# Patient Record
Sex: MALE | Race: WHITE | NOT HISPANIC OR LATINO | Employment: OTHER | URBAN - METROPOLITAN AREA
[De-identification: names, ages, dates, MRNs, and addresses within clinical notes are randomized per-mention and may not be internally consistent; named-entity substitution may affect disease eponyms.]

---

## 2018-01-25 ENCOUNTER — OFFICE VISIT CONVERTED (OUTPATIENT)
Dept: FAMILY MEDICINE CLINIC | Facility: CLINIC | Age: 73
End: 2018-01-25
Attending: NURSE PRACTITIONER

## 2018-02-23 ENCOUNTER — CONVERSION ENCOUNTER (OUTPATIENT)
Dept: FAMILY MEDICINE CLINIC | Facility: CLINIC | Age: 73
End: 2018-02-23

## 2018-02-23 ENCOUNTER — OFFICE VISIT CONVERTED (OUTPATIENT)
Dept: FAMILY MEDICINE CLINIC | Facility: CLINIC | Age: 73
End: 2018-02-23
Attending: FAMILY MEDICINE

## 2018-07-23 ENCOUNTER — OFFICE VISIT CONVERTED (OUTPATIENT)
Dept: CARDIOLOGY | Facility: CLINIC | Age: 73
End: 2018-07-23
Attending: INTERNAL MEDICINE

## 2018-07-23 ENCOUNTER — CONVERSION ENCOUNTER (OUTPATIENT)
Dept: CARDIOLOGY | Facility: CLINIC | Age: 73
End: 2018-07-23

## 2018-08-20 ENCOUNTER — OFFICE VISIT CONVERTED (OUTPATIENT)
Dept: FAMILY MEDICINE CLINIC | Facility: CLINIC | Age: 73
End: 2018-08-20
Attending: FAMILY MEDICINE

## 2018-08-29 ENCOUNTER — OFFICE VISIT CONVERTED (OUTPATIENT)
Dept: FAMILY MEDICINE CLINIC | Facility: CLINIC | Age: 73
End: 2018-08-29
Attending: NURSE PRACTITIONER

## 2018-11-02 ENCOUNTER — OFFICE VISIT CONVERTED (OUTPATIENT)
Dept: FAMILY MEDICINE CLINIC | Facility: CLINIC | Age: 73
End: 2018-11-02
Attending: PHYSICIAN ASSISTANT

## 2018-11-02 ENCOUNTER — CONVERSION ENCOUNTER (OUTPATIENT)
Dept: FAMILY MEDICINE CLINIC | Facility: CLINIC | Age: 73
End: 2018-11-02

## 2019-01-14 ENCOUNTER — HOSPITAL ENCOUNTER (OUTPATIENT)
Dept: OTHER | Facility: HOSPITAL | Age: 74
Discharge: HOME | End: 2019-01-14
Attending: INTERNAL MEDICINE

## 2019-01-14 LAB
ALBUMIN SERPL-MCNC: 4.2 G/DL (ref 3.5–5)
ALBUMIN/GLOB SERPL: 1.4 {RATIO} (ref 1.4–2.6)
ALP SERPL-CCNC: 74 U/L (ref 56–155)
ALT SERPL-CCNC: 14 U/L (ref 10–40)
ANION GAP SERPL CALC-SCNC: 20 MMOL/L (ref 8–19)
AST SERPL-CCNC: 14 U/L (ref 15–50)
BILIRUB SERPL-MCNC: 0.38 MG/DL (ref 0.2–1.3)
BUN SERPL-MCNC: 22 MG/DL (ref 5–25)
BUN/CREAT SERPL: 16 {RATIO} (ref 6–20)
CALCIUM SERPL-MCNC: 9.6 MG/DL (ref 8.7–10.4)
CHLORIDE SERPL-SCNC: 102 MMOL/L (ref 99–111)
CHOLEST SERPL-MCNC: 147 MG/DL (ref 107–200)
CHOLEST/HDLC SERPL: 4 {RATIO} (ref 3–6)
CONV CO2: 22 MMOL/L (ref 22–32)
CONV TOTAL PROTEIN: 7.1 G/DL (ref 6.3–8.2)
CREAT UR-MCNC: 1.34 MG/DL (ref 0.7–1.2)
GFR SERPLBLD BASED ON 1.73 SQ M-ARVRAT: 52 ML/MIN/{1.73_M2}
GLOBULIN UR ELPH-MCNC: 2.9 G/DL (ref 2–3.5)
GLUCOSE SERPL-MCNC: 97 MG/DL (ref 70–99)
HDLC SERPL-MCNC: 37 MG/DL (ref 40–60)
LDLC SERPL CALC-MCNC: 47 MG/DL (ref 70–100)
OSMOLALITY SERPL CALC.SUM OF ELEC: 291 MOSM/KG (ref 273–304)
POTASSIUM SERPL-SCNC: 4.6 MMOL/L (ref 3.5–5.3)
SODIUM SERPL-SCNC: 139 MMOL/L (ref 135–147)
TRIGL SERPL-MCNC: 315 MG/DL (ref 40–150)
VLDLC SERPL-MCNC: 63 MG/DL (ref 5–37)

## 2019-01-28 ENCOUNTER — OFFICE VISIT CONVERTED (OUTPATIENT)
Dept: CARDIOLOGY | Facility: CLINIC | Age: 74
End: 2019-01-28
Attending: NURSE PRACTITIONER

## 2019-01-28 ENCOUNTER — CONVERSION ENCOUNTER (OUTPATIENT)
Dept: CARDIOLOGY | Facility: CLINIC | Age: 74
End: 2019-01-28

## 2019-02-20 ENCOUNTER — HOSPITAL ENCOUNTER (OUTPATIENT)
Dept: OTHER | Facility: HOSPITAL | Age: 74
Discharge: HOME | End: 2019-02-20
Attending: NURSE PRACTITIONER

## 2019-02-20 LAB
ANION GAP SERPL CALC-SCNC: 17 MMOL/L (ref 8–19)
BUN SERPL-MCNC: 18 MG/DL (ref 5–25)
BUN/CREAT SERPL: 15 {RATIO} (ref 6–20)
CALCIUM SERPL-MCNC: 9.4 MG/DL (ref 8.7–10.4)
CHLORIDE SERPL-SCNC: 102 MMOL/L (ref 99–111)
CONV CO2: 26 MMOL/L (ref 22–32)
CREAT UR-MCNC: 1.19 MG/DL (ref 0.7–1.2)
GFR SERPLBLD BASED ON 1.73 SQ M-ARVRAT: >60 ML/MIN/{1.73_M2}
GLUCOSE SERPL-MCNC: 120 MG/DL (ref 70–99)
OSMOLALITY SERPL CALC.SUM OF ELEC: 293 MOSM/KG (ref 273–304)
POTASSIUM SERPL-SCNC: 4.5 MMOL/L (ref 3.5–5.3)
SODIUM SERPL-SCNC: 140 MMOL/L (ref 135–147)

## 2019-04-01 ENCOUNTER — OFFICE VISIT CONVERTED (OUTPATIENT)
Dept: FAMILY MEDICINE CLINIC | Facility: CLINIC | Age: 74
End: 2019-04-01
Attending: NURSE PRACTITIONER

## 2019-07-17 ENCOUNTER — HOSPITAL ENCOUNTER (OUTPATIENT)
Dept: LAB | Facility: HOSPITAL | Age: 74
Discharge: HOME | End: 2019-07-17
Attending: FAMILY MEDICINE

## 2019-07-17 LAB
ANION GAP SERPL CALC-SCNC: 23 MMOL/L (ref 8–19)
BASOPHILS # BLD AUTO: 0.03 10*3/UL (ref 0–0.2)
BASOPHILS NFR BLD AUTO: 0.4 % (ref 0–3)
BUN SERPL-MCNC: 22 MG/DL (ref 5–25)
BUN/CREAT SERPL: 16 {RATIO} (ref 6–20)
CALCIUM SERPL-MCNC: 9.6 MG/DL (ref 8.7–10.4)
CHLORIDE SERPL-SCNC: 100 MMOL/L (ref 99–111)
CONV ABS IMM GRAN: 0.05 10*3/UL (ref 0–0.2)
CONV CO2: 22 MMOL/L (ref 22–32)
CONV IMMATURE GRAN: 0.6 % (ref 0–1.8)
CREAT UR-MCNC: 1.36 MG/DL (ref 0.7–1.2)
DEPRECATED RDW RBC AUTO: 46.1 FL (ref 35.1–43.9)
EOSINOPHIL # BLD AUTO: 0.4 10*3/UL (ref 0–0.7)
EOSINOPHIL # BLD AUTO: 5.1 % (ref 0–7)
ERYTHROCYTE [DISTWIDTH] IN BLOOD BY AUTOMATED COUNT: 13.4 % (ref 11.6–14.4)
FOLATE SERPL-MCNC: >20 NG/ML (ref 4.8–20)
GFR SERPLBLD BASED ON 1.73 SQ M-ARVRAT: 51 ML/MIN/{1.73_M2}
GLUCOSE SERPL-MCNC: 90 MG/DL (ref 70–99)
HBA1C MFR BLD: 13.3 G/DL (ref 14–18)
HCT VFR BLD AUTO: 41.1 % (ref 42–52)
IRON SATN MFR SERPL: 34 % (ref 20–55)
IRON SERPL-MCNC: 89 UG/DL (ref 70–180)
LYMPHOCYTES # BLD AUTO: 2.09 10*3/UL (ref 1–5)
MCH RBC QN AUTO: 30.4 PG (ref 27–31)
MCHC RBC AUTO-ENTMCNC: 32.4 G/DL (ref 33–37)
MCV RBC AUTO: 93.8 FL (ref 80–96)
MONOCYTES # BLD AUTO: 0.5 10*3/UL (ref 0.2–1.2)
MONOCYTES NFR BLD AUTO: 6.3 % (ref 3–10)
NEUTROPHILS # BLD AUTO: 4.84 10*3/UL (ref 2–8)
NEUTROPHILS NFR BLD AUTO: 61.2 % (ref 30–85)
NRBC CBCN: 0 % (ref 0–0.7)
OSMOLALITY SERPL CALC.SUM OF ELEC: 291 MOSM/KG (ref 273–304)
PLATELET # BLD AUTO: 182 10*3/UL (ref 130–400)
PMV BLD AUTO: 10.9 FL (ref 9.4–12.4)
POTASSIUM SERPL-SCNC: 5.6 MMOL/L (ref 3.5–5.3)
RBC # BLD AUTO: 4.38 10*6/UL (ref 4.7–6.1)
SODIUM SERPL-SCNC: 139 MMOL/L (ref 135–147)
TIBC SERPL-MCNC: 263 UG/DL (ref 245–450)
TRANSFERRIN SERPL-MCNC: 184 MG/DL (ref 215–365)
VARIANT LYMPHS NFR BLD MANUAL: 26.4 % (ref 20–45)
VIT B12 SERPL-MCNC: 1277 PG/ML (ref 211–911)
WBC # BLD AUTO: 7.91 10*3/UL (ref 4.8–10.8)

## 2019-07-22 ENCOUNTER — HOSPITAL ENCOUNTER (OUTPATIENT)
Dept: LAB | Facility: HOSPITAL | Age: 74
Discharge: HOME | End: 2019-07-22
Attending: FAMILY MEDICINE

## 2019-07-22 LAB
FERRITIN SERPL-MCNC: 558 NG/ML (ref 30–300)
POTASSIUM SERPL-SCNC: 4.5 MMOL/L (ref 3.5–5.3)

## 2019-10-25 ENCOUNTER — OFFICE VISIT CONVERTED (OUTPATIENT)
Dept: FAMILY MEDICINE CLINIC | Facility: CLINIC | Age: 74
End: 2019-10-25
Attending: NURSE PRACTITIONER

## 2019-10-28 ENCOUNTER — HOSPITAL ENCOUNTER (OUTPATIENT)
Dept: LAB | Facility: HOSPITAL | Age: 74
Discharge: HOME | End: 2019-10-28
Attending: NURSE PRACTITIONER

## 2019-10-28 LAB
CHOLEST SERPL-MCNC: 146 MG/DL (ref 107–200)
CHOLEST/HDLC SERPL: 3.7 {RATIO} (ref 3–6)
HDLC SERPL-MCNC: 40 MG/DL (ref 40–60)
LDLC SERPL CALC-MCNC: 58 MG/DL (ref 70–100)
PSA SERPL-MCNC: 0.8 NG/ML (ref 0–4)
TRIGL SERPL-MCNC: 242 MG/DL (ref 40–150)
VLDLC SERPL-MCNC: 48 MG/DL (ref 5–37)

## 2019-11-01 ENCOUNTER — HOSPITAL ENCOUNTER (OUTPATIENT)
Dept: LAB | Facility: HOSPITAL | Age: 74
Discharge: HOME | End: 2019-11-01
Attending: INTERNAL MEDICINE

## 2019-11-01 LAB
ALBUMIN SERPL-MCNC: 4.5 G/DL (ref 3.5–5)
ALBUMIN/GLOB SERPL: 1.7 {RATIO} (ref 1.4–2.6)
ALP SERPL-CCNC: 74 U/L (ref 56–155)
ALT SERPL-CCNC: 20 U/L (ref 10–40)
ANION GAP SERPL CALC-SCNC: 19 MMOL/L (ref 8–19)
APPEARANCE UR: CLEAR
AST SERPL-CCNC: 20 U/L (ref 15–50)
BILIRUB SERPL-MCNC: 0.35 MG/DL (ref 0.2–1.3)
BILIRUB UR QL: NEGATIVE
BUN SERPL-MCNC: 14 MG/DL (ref 5–25)
BUN/CREAT SERPL: 14 {RATIO} (ref 6–20)
CALCIUM SERPL-MCNC: 9.8 MG/DL (ref 8.7–10.4)
CHLORIDE SERPL-SCNC: 103 MMOL/L (ref 99–111)
CHOLEST SERPL-MCNC: 152 MG/DL (ref 107–200)
CHOLEST/HDLC SERPL: 3.9 {RATIO} (ref 3–6)
COLOR UR: YELLOW
CONV CO2: 23 MMOL/L (ref 22–32)
CONV COLLECTION SOURCE (UA): NORMAL
CONV TOTAL PROTEIN: 7.2 G/DL (ref 6.3–8.2)
CONV UROBILINOGEN IN URINE BY AUTOMATED TEST STRIP: 0.2 {EHRLICHU}/DL (ref 0.1–1)
CREAT UR-MCNC: 1.03 MG/DL (ref 0.7–1.2)
GFR SERPLBLD BASED ON 1.73 SQ M-ARVRAT: >60 ML/MIN/{1.73_M2}
GLOBULIN UR ELPH-MCNC: 2.7 G/DL (ref 2–3.5)
GLUCOSE SERPL-MCNC: 101 MG/DL (ref 70–99)
GLUCOSE UR QL: NEGATIVE MG/DL
HDLC SERPL-MCNC: 39 MG/DL (ref 40–60)
HGB UR QL STRIP: NEGATIVE
KETONES UR QL STRIP: NEGATIVE MG/DL
LDLC SERPL CALC-MCNC: 51 MG/DL (ref 70–100)
LEUKOCYTE ESTERASE UR QL STRIP: NEGATIVE
NITRITE UR QL STRIP: NEGATIVE
OSMOLALITY SERPL CALC.SUM OF ELEC: 291 MOSM/KG (ref 273–304)
PH UR STRIP.AUTO: 6 [PH] (ref 5–8)
POTASSIUM SERPL-SCNC: 4.5 MMOL/L (ref 3.5–5.3)
PROT UR QL: NEGATIVE MG/DL
SODIUM SERPL-SCNC: 140 MMOL/L (ref 135–147)
SP GR UR: 1.01 (ref 1–1.03)
TRIGL SERPL-MCNC: 308 MG/DL (ref 40–150)
VLDLC SERPL-MCNC: 62 MG/DL (ref 5–37)

## 2019-11-04 ENCOUNTER — CONVERSION ENCOUNTER (OUTPATIENT)
Dept: CARDIOLOGY | Facility: CLINIC | Age: 74
End: 2019-11-04

## 2019-11-04 ENCOUNTER — HOSPITAL ENCOUNTER (OUTPATIENT)
Dept: LAB | Facility: HOSPITAL | Age: 74
Discharge: HOME | End: 2019-11-04
Attending: INTERNAL MEDICINE

## 2019-11-04 ENCOUNTER — OFFICE VISIT CONVERTED (OUTPATIENT)
Dept: CARDIOLOGY | Facility: CLINIC | Age: 74
End: 2019-11-04
Attending: NURSE PRACTITIONER

## 2019-11-04 LAB
PROT 24H UR-MRATE: 104.6 MG/(24.H) (ref 42–225)
PROT 24H UR-MRATE: 12.3 MG/DL (ref 0–12)
SPECIMEN VOL 24H UR: 850 ML

## 2020-01-25 ENCOUNTER — HOSPITAL ENCOUNTER (OUTPATIENT)
Dept: URGENT CARE | Facility: CLINIC | Age: 75
Discharge: HOME | End: 2020-01-25

## 2020-01-27 LAB — BACTERIA SPEC AEROBE CULT: NORMAL

## 2020-01-31 ENCOUNTER — OFFICE VISIT CONVERTED (OUTPATIENT)
Dept: FAMILY MEDICINE CLINIC | Facility: CLINIC | Age: 75
End: 2020-01-31
Attending: NURSE PRACTITIONER

## 2020-01-31 ENCOUNTER — HOSPITAL ENCOUNTER (OUTPATIENT)
Dept: GENERAL RADIOLOGY | Facility: HOSPITAL | Age: 75
Discharge: HOME | End: 2020-01-31
Attending: NURSE PRACTITIONER

## 2020-01-31 ENCOUNTER — CONVERSION ENCOUNTER (OUTPATIENT)
Dept: FAMILY MEDICINE CLINIC | Facility: CLINIC | Age: 75
End: 2020-01-31

## 2020-04-29 ENCOUNTER — HOSPITAL ENCOUNTER (OUTPATIENT)
Dept: GENERAL RADIOLOGY | Facility: HOSPITAL | Age: 75
Discharge: HOME | End: 2020-04-29
Attending: NURSE PRACTITIONER

## 2020-04-29 ENCOUNTER — TELEMEDICINE CONVERTED (OUTPATIENT)
Dept: FAMILY MEDICINE CLINIC | Facility: CLINIC | Age: 75
End: 2020-04-29
Attending: NURSE PRACTITIONER

## 2020-05-20 ENCOUNTER — TELEMEDICINE CONVERTED (OUTPATIENT)
Dept: FAMILY MEDICINE CLINIC | Facility: CLINIC | Age: 75
End: 2020-05-20
Attending: NURSE PRACTITIONER

## 2020-05-20 ENCOUNTER — CONVERSION ENCOUNTER (OUTPATIENT)
Dept: OTHER | Facility: HOSPITAL | Age: 75
End: 2020-05-20

## 2020-05-20 ENCOUNTER — HOSPITAL ENCOUNTER (OUTPATIENT)
Dept: OTHER | Facility: HOSPITAL | Age: 75
Discharge: HOME | End: 2020-05-20
Attending: NURSE PRACTITIONER

## 2020-05-21 ENCOUNTER — HOSPITAL ENCOUNTER (OUTPATIENT)
Dept: CT IMAGING | Facility: HOSPITAL | Age: 75
Discharge: HOME | End: 2020-05-21
Attending: NURSE PRACTITIONER

## 2020-05-21 LAB
CREAT BLD-MCNC: 1.2 MG/DL (ref 0.6–1.4)
GFR SERPLBLD BASED ON 1.73 SQ M-ARVRAT: 59 ML/MIN/{1.73_M2}

## 2020-05-22 LAB — SARS-COV-2 RNA SPEC QL NAA+PROBE: NOT DETECTED

## 2020-05-28 ENCOUNTER — HOSPITAL ENCOUNTER (OUTPATIENT)
Dept: CARDIOLOGY | Facility: HOSPITAL | Age: 75
Discharge: HOME | End: 2020-05-28
Attending: NURSE PRACTITIONER

## 2020-06-24 ENCOUNTER — OFFICE VISIT CONVERTED (OUTPATIENT)
Dept: FAMILY MEDICINE CLINIC | Facility: CLINIC | Age: 75
End: 2020-06-24
Attending: NURSE PRACTITIONER

## 2020-06-26 ENCOUNTER — HOSPITAL ENCOUNTER (OUTPATIENT)
Dept: PERIOP | Facility: HOSPITAL | Age: 75
Discharge: HOME | End: 2020-06-26
Attending: OPHTHALMOLOGY

## 2020-06-29 LAB — SARS-COV-2 RNA SPEC QL NAA+PROBE: NOT DETECTED

## 2020-06-30 ENCOUNTER — HOSPITAL ENCOUNTER (OUTPATIENT)
Dept: PERIOP | Facility: HOSPITAL | Age: 75
Setting detail: HOSPITAL OUTPATIENT SURGERY
Discharge: HOME | End: 2020-06-30
Attending: OPHTHALMOLOGY

## 2020-07-20 ENCOUNTER — HOSPITAL ENCOUNTER (OUTPATIENT)
Dept: CARDIOLOGY | Facility: HOSPITAL | Age: 75
Discharge: HOME | End: 2020-07-20
Attending: SURGERY

## 2020-08-03 ENCOUNTER — HOSPITAL ENCOUNTER (OUTPATIENT)
Dept: OTHER | Facility: HOSPITAL | Age: 75
Discharge: HOME | End: 2020-08-03
Attending: NURSE PRACTITIONER

## 2020-08-03 LAB
ALBUMIN SERPL-MCNC: 4.1 G/DL (ref 3.5–5)
ALBUMIN/GLOB SERPL: 1.5 {RATIO} (ref 1.4–2.6)
ALP SERPL-CCNC: 69 U/L (ref 56–155)
ALT SERPL-CCNC: 19 U/L (ref 10–40)
ANION GAP SERPL CALC-SCNC: 21 MMOL/L (ref 8–19)
AST SERPL-CCNC: 19 U/L (ref 15–50)
BILIRUB SERPL-MCNC: 0.6 MG/DL (ref 0.2–1.3)
BUN SERPL-MCNC: 16 MG/DL (ref 5–25)
BUN/CREAT SERPL: 14 {RATIO} (ref 6–20)
CALCIUM SERPL-MCNC: 10.2 MG/DL (ref 8.7–10.4)
CHLORIDE SERPL-SCNC: 102 MMOL/L (ref 99–111)
CHOLEST SERPL-MCNC: 160 MG/DL (ref 107–200)
CHOLEST/HDLC SERPL: 4.3 {RATIO} (ref 3–6)
CONV CO2: 21 MMOL/L (ref 22–32)
CONV TOTAL PROTEIN: 6.9 G/DL (ref 6.3–8.2)
CREAT UR-MCNC: 1.14 MG/DL (ref 0.7–1.2)
GFR SERPLBLD BASED ON 1.73 SQ M-ARVRAT: >60 ML/MIN/{1.73_M2}
GLOBULIN UR ELPH-MCNC: 2.8 G/DL (ref 2–3.5)
GLUCOSE SERPL-MCNC: 92 MG/DL (ref 70–99)
HDLC SERPL-MCNC: 37 MG/DL (ref 40–60)
LDLC SERPL CALC-MCNC: 64 MG/DL (ref 70–100)
OSMOLALITY SERPL CALC.SUM OF ELEC: 289 MOSM/KG (ref 273–304)
POTASSIUM SERPL-SCNC: 4.5 MMOL/L (ref 3.5–5.3)
SODIUM SERPL-SCNC: 139 MMOL/L (ref 135–147)
TRIGL SERPL-MCNC: 295 MG/DL (ref 40–150)
VLDLC SERPL-MCNC: 59 MG/DL (ref 5–37)

## 2020-08-10 ENCOUNTER — OFFICE VISIT CONVERTED (OUTPATIENT)
Dept: CARDIOLOGY | Facility: CLINIC | Age: 75
End: 2020-08-10
Attending: INTERNAL MEDICINE

## 2020-08-14 ENCOUNTER — HOSPITAL ENCOUNTER (OUTPATIENT)
Dept: OTHER | Facility: HOSPITAL | Age: 75
Discharge: HOME | End: 2020-08-14
Attending: INTERNAL MEDICINE

## 2020-08-14 LAB
EST. AVERAGE GLUCOSE BLD GHB EST-MCNC: 123 MG/DL
GLUCOSE SERPL-MCNC: 96 MG/DL (ref 70–110)
HBA1C MFR BLD: 5.9 % (ref 3.5–5.7)
TSH SERPL-ACNC: 0.67 M[IU]/L (ref 0.27–4.2)

## 2020-08-21 ENCOUNTER — HOSPITAL ENCOUNTER (OUTPATIENT)
Dept: PREADMISSION TESTING | Facility: HOSPITAL | Age: 75
Discharge: HOME | End: 2020-08-21
Attending: NURSE PRACTITIONER

## 2020-08-22 LAB — SARS-COV-2 RNA SPEC QL NAA+PROBE: NOT DETECTED

## 2020-08-26 ENCOUNTER — HOSPITAL ENCOUNTER (OUTPATIENT)
Dept: CARDIOLOGY | Facility: HOSPITAL | Age: 75
Discharge: HOME | End: 2020-08-26
Attending: NURSE PRACTITIONER

## 2020-10-01 ENCOUNTER — OFFICE VISIT CONVERTED (OUTPATIENT)
Dept: ONCOLOGY | Facility: HOSPITAL | Age: 75
End: 2020-10-01
Attending: INTERNAL MEDICINE

## 2020-10-08 ENCOUNTER — HOSPITAL ENCOUNTER (OUTPATIENT)
Dept: URGENT CARE | Facility: CLINIC | Age: 75
Discharge: HOME | End: 2020-10-08
Attending: FAMILY MEDICINE

## 2020-10-10 LAB
BACTERIA SPEC AEROBE CULT: NORMAL
SARS-COV-2 RNA SPEC QL NAA+PROBE: DETECTED

## 2020-10-19 ENCOUNTER — HOSPITAL ENCOUNTER (OUTPATIENT)
Dept: LAB | Facility: HOSPITAL | Age: 75
Discharge: HOME | End: 2020-10-19
Attending: INTERNAL MEDICINE

## 2020-10-19 LAB
ANION GAP SERPL CALC-SCNC: 16 MMOL/L (ref 8–19)
BUN SERPL-MCNC: 15 MG/DL (ref 5–25)
BUN/CREAT SERPL: 13 {RATIO} (ref 6–20)
CALCIUM SERPL-MCNC: 9.9 MG/DL (ref 8.7–10.4)
CHLORIDE SERPL-SCNC: 103 MMOL/L (ref 99–111)
CONV CO2: 25 MMOL/L (ref 22–32)
CREAT UR-MCNC: 1.17 MG/DL (ref 0.7–1.2)
GFR SERPLBLD BASED ON 1.73 SQ M-ARVRAT: >60 ML/MIN/{1.73_M2}
GLUCOSE SERPL-MCNC: 87 MG/DL (ref 70–99)
OSMOLALITY SERPL CALC.SUM OF ELEC: 290 MOSM/KG (ref 273–304)
POTASSIUM SERPL-SCNC: 4.2 MMOL/L (ref 3.5–5.3)
SODIUM SERPL-SCNC: 140 MMOL/L (ref 135–147)

## 2020-10-24 ENCOUNTER — HOSPITAL ENCOUNTER (OUTPATIENT)
Dept: OTHER | Facility: HOSPITAL | Age: 75
Discharge: HOME | End: 2020-10-24
Attending: INTERNAL MEDICINE

## 2020-10-24 LAB
PROT 24H UR-MRATE: 12.8 MG/DL (ref 0–12)
PROT 24H UR-MRATE: 128 MG/(24.H) (ref 42–225)
SPECIMEN VOL 24H UR: 1000 ML

## 2020-10-27 LAB
APPEARANCE UR: CLEAR
BILIRUB UR QL: NEGATIVE
CASTS URNS QL MICRO: ABNORMAL /[LPF]
COLOR UR: YELLOW
CONV BACTERIA: NEGATIVE
CONV COLLECTION SOURCE (UA): ABNORMAL
CONV HYALINE CASTS IN URINE MICRO: ABNORMAL /[LPF]
CONV UROBILINOGEN IN URINE BY AUTOMATED TEST STRIP: 0.2 {EHRLICHU}/DL (ref 0.1–1)
GLUCOSE UR QL: NEGATIVE MG/DL
HGB UR QL STRIP: NEGATIVE
KETONES UR QL STRIP: NEGATIVE MG/DL
LEUKOCYTE ESTERASE UR QL STRIP: ABNORMAL
NITRITE UR QL STRIP: NEGATIVE
PH UR STRIP.AUTO: 5.5 [PH] (ref 5–8)
PROT UR QL: NEGATIVE MG/DL
RBC #/AREA URNS HPF: ABNORMAL /[HPF]
SP GR UR: 1.02 (ref 1–1.03)
WBC #/AREA URNS HPF: ABNORMAL /[HPF]

## 2020-11-09 ENCOUNTER — OFFICE VISIT CONVERTED (OUTPATIENT)
Dept: FAMILY MEDICINE CLINIC | Facility: CLINIC | Age: 75
End: 2020-11-09
Attending: NURSE PRACTITIONER

## 2020-11-09 ENCOUNTER — HOSPITAL ENCOUNTER (OUTPATIENT)
Dept: LAB | Facility: HOSPITAL | Age: 75
Discharge: HOME | End: 2020-11-09
Attending: NURSE PRACTITIONER

## 2020-11-09 LAB
25(OH)D3 SERPL-MCNC: 45.9 NG/ML (ref 30–100)
PSA SERPL-MCNC: 0.81 NG/ML (ref 0–4)
TESTOST SERPL-MCNC: 252 NG/DL (ref 193–740)

## 2020-12-01 ENCOUNTER — HOSPITAL ENCOUNTER (OUTPATIENT)
Dept: OTHER | Facility: HOSPITAL | Age: 75
Discharge: HOME | End: 2020-12-01
Attending: INTERNAL MEDICINE

## 2020-12-01 LAB
ALBUMIN SERPL-MCNC: 4.4 G/DL (ref 3.5–5)
ALBUMIN/GLOB SERPL: 1.6 {RATIO} (ref 1.4–2.6)
ALP SERPL-CCNC: 72 U/L (ref 56–155)
ALT SERPL-CCNC: 12 U/L (ref 10–40)
ANION GAP SERPL CALC-SCNC: 16 MMOL/L (ref 8–19)
AST SERPL-CCNC: 15 U/L (ref 15–50)
BILIRUB SERPL-MCNC: 0.57 MG/DL (ref 0.2–1.3)
BNP SERPL-MCNC: 43 PG/ML (ref 0–900)
BUN SERPL-MCNC: 20 MG/DL (ref 5–25)
BUN/CREAT SERPL: 14 {RATIO} (ref 6–20)
CALCIUM SERPL-MCNC: 10.3 MG/DL (ref 8.7–10.4)
CHLORIDE SERPL-SCNC: 103 MMOL/L (ref 99–111)
CHOLEST SERPL-MCNC: 149 MG/DL (ref 107–200)
CHOLEST/HDLC SERPL: 3.9 {RATIO} (ref 3–6)
CONV CO2: 26 MMOL/L (ref 22–32)
CONV TOTAL PROTEIN: 7.2 G/DL (ref 6.3–8.2)
CREAT UR-MCNC: 1.44 MG/DL (ref 0.7–1.2)
GFR SERPLBLD BASED ON 1.73 SQ M-ARVRAT: 47 ML/MIN/{1.73_M2}
GLOBULIN UR ELPH-MCNC: 2.8 G/DL (ref 2–3.5)
GLUCOSE SERPL-MCNC: 105 MG/DL (ref 70–99)
HDLC SERPL-MCNC: 38 MG/DL (ref 40–60)
LDLC SERPL CALC-MCNC: 66 MG/DL (ref 70–100)
OSMOLALITY SERPL CALC.SUM OF ELEC: 293 MOSM/KG (ref 273–304)
POTASSIUM SERPL-SCNC: 4.5 MMOL/L (ref 3.5–5.3)
SODIUM SERPL-SCNC: 140 MMOL/L (ref 135–147)
T4 FREE SERPL-MCNC: 1.3 NG/DL (ref 0.9–1.8)
TRIGL SERPL-MCNC: 227 MG/DL (ref 40–150)
TSH SERPL-ACNC: 2.47 M[IU]/L (ref 0.27–4.2)
VLDLC SERPL-MCNC: 45 MG/DL (ref 5–37)

## 2020-12-04 ENCOUNTER — TELEMEDICINE CONVERTED (OUTPATIENT)
Dept: FAMILY MEDICINE CLINIC | Facility: CLINIC | Age: 75
End: 2020-12-04
Attending: NURSE PRACTITIONER

## 2020-12-15 ENCOUNTER — OFFICE VISIT CONVERTED (OUTPATIENT)
Dept: UROLOGY | Facility: CLINIC | Age: 75
End: 2020-12-15
Attending: NURSE PRACTITIONER

## 2020-12-15 ENCOUNTER — CONVERSION ENCOUNTER (OUTPATIENT)
Dept: UROLOGY | Facility: CLINIC | Age: 75
End: 2020-12-15

## 2020-12-16 ENCOUNTER — OFFICE VISIT CONVERTED (OUTPATIENT)
Dept: OTOLARYNGOLOGY | Facility: CLINIC | Age: 75
End: 2020-12-16
Attending: NURSE PRACTITIONER

## 2021-01-04 ENCOUNTER — HOSPITAL ENCOUNTER (OUTPATIENT)
Dept: OTHER | Facility: HOSPITAL | Age: 76
Discharge: HOME | End: 2021-01-04
Attending: NURSE PRACTITIONER

## 2021-01-04 LAB
ANION GAP SERPL CALC-SCNC: 15 MMOL/L (ref 8–19)
BUN SERPL-MCNC: 16 MG/DL (ref 5–25)
BUN/CREAT SERPL: 13 {RATIO} (ref 6–20)
CALCIUM SERPL-MCNC: 9.4 MG/DL (ref 8.7–10.4)
CHLORIDE SERPL-SCNC: 103 MMOL/L (ref 99–111)
CONV CO2: 25 MMOL/L (ref 22–32)
CREAT UR-MCNC: 1.24 MG/DL (ref 0.7–1.2)
GFR SERPLBLD BASED ON 1.73 SQ M-ARVRAT: 57 ML/MIN/{1.73_M2}
GLUCOSE SERPL-MCNC: 97 MG/DL (ref 70–99)
OSMOLALITY SERPL CALC.SUM OF ELEC: 289 MOSM/KG (ref 273–304)
POTASSIUM SERPL-SCNC: 4.3 MMOL/L (ref 3.5–5.3)
SODIUM SERPL-SCNC: 139 MMOL/L (ref 135–147)

## 2021-01-14 ENCOUNTER — CONVERSION ENCOUNTER (OUTPATIENT)
Dept: CARDIOLOGY | Facility: CLINIC | Age: 76
End: 2021-01-14

## 2021-01-14 ENCOUNTER — HOSPITAL ENCOUNTER (OUTPATIENT)
Dept: GENERAL RADIOLOGY | Facility: HOSPITAL | Age: 76
Discharge: HOME | End: 2021-01-14
Attending: NURSE PRACTITIONER

## 2021-01-14 ENCOUNTER — OFFICE VISIT CONVERTED (OUTPATIENT)
Dept: CARDIOLOGY | Facility: CLINIC | Age: 76
End: 2021-01-14
Attending: INTERNAL MEDICINE

## 2021-01-14 ENCOUNTER — CONVERSION ENCOUNTER (OUTPATIENT)
Dept: FAMILY MEDICINE CLINIC | Facility: CLINIC | Age: 76
End: 2021-01-14

## 2021-01-14 ENCOUNTER — OFFICE VISIT CONVERTED (OUTPATIENT)
Dept: FAMILY MEDICINE CLINIC | Facility: CLINIC | Age: 76
End: 2021-01-14
Attending: NURSE PRACTITIONER

## 2021-02-24 ENCOUNTER — HOSPITAL ENCOUNTER (OUTPATIENT)
Dept: VACCINE CLINIC | Facility: HOSPITAL | Age: 76
Discharge: HOME | End: 2021-02-24
Attending: INTERNAL MEDICINE

## 2021-03-09 ENCOUNTER — HOSPITAL ENCOUNTER (OUTPATIENT)
Dept: LAB | Facility: HOSPITAL | Age: 76
Discharge: HOME | End: 2021-03-09
Attending: NURSE PRACTITIONER

## 2021-03-09 ENCOUNTER — OFFICE VISIT CONVERTED (OUTPATIENT)
Dept: FAMILY MEDICINE CLINIC | Facility: CLINIC | Age: 76
End: 2021-03-09
Attending: NURSE PRACTITIONER

## 2021-03-09 ENCOUNTER — CONVERSION ENCOUNTER (OUTPATIENT)
Dept: FAMILY MEDICINE CLINIC | Facility: CLINIC | Age: 76
End: 2021-03-09

## 2021-03-09 LAB
25(OH)D3 SERPL-MCNC: 39.7 NG/ML (ref 30–100)
ALBUMIN SERPL-MCNC: 4.2 G/DL (ref 3.5–5)
ALBUMIN/GLOB SERPL: 1.6 {RATIO} (ref 1.4–2.6)
ALP SERPL-CCNC: 68 U/L (ref 56–155)
ALT SERPL-CCNC: 19 U/L (ref 10–40)
ANION GAP SERPL CALC-SCNC: 15 MMOL/L (ref 8–19)
AST SERPL-CCNC: 21 U/L (ref 15–50)
BASOPHILS # BLD AUTO: 0.05 10*3/UL (ref 0–0.2)
BASOPHILS NFR BLD AUTO: 0.6 % (ref 0–3)
BILIRUB SERPL-MCNC: 0.36 MG/DL (ref 0.2–1.3)
BUN SERPL-MCNC: 16 MG/DL (ref 5–25)
BUN/CREAT SERPL: 14 {RATIO} (ref 6–20)
CALCIUM SERPL-MCNC: 9.2 MG/DL (ref 8.7–10.4)
CHLORIDE SERPL-SCNC: 105 MMOL/L (ref 99–111)
CHOLEST SERPL-MCNC: 142 MG/DL (ref 107–200)
CHOLEST/HDLC SERPL: 3.7 {RATIO} (ref 3–6)
CONV ABS IMM GRAN: 0.05 10*3/UL (ref 0–0.2)
CONV CO2: 25 MMOL/L (ref 22–32)
CONV IMMATURE GRAN: 0.6 % (ref 0–1.8)
CONV TOTAL PROTEIN: 6.9 G/DL (ref 6.3–8.2)
CREAT UR-MCNC: 1.15 MG/DL (ref 0.7–1.2)
DEPRECATED RDW RBC AUTO: 45.4 FL (ref 35.1–43.9)
EOSINOPHIL # BLD AUTO: 0.28 10*3/UL (ref 0–0.7)
EOSINOPHIL # BLD AUTO: 3.4 % (ref 0–7)
ERYTHROCYTE [DISTWIDTH] IN BLOOD BY AUTOMATED COUNT: 13.5 % (ref 11.6–14.4)
EST. AVERAGE GLUCOSE BLD GHB EST-MCNC: 117 MG/DL
FERRITIN SERPL-MCNC: 489 NG/ML (ref 30–300)
GFR SERPLBLD BASED ON 1.73 SQ M-ARVRAT: >60 ML/MIN/{1.73_M2}
GLOBULIN UR ELPH-MCNC: 2.7 G/DL (ref 2–3.5)
GLUCOSE SERPL-MCNC: 98 MG/DL (ref 70–99)
HBA1C MFR BLD: 5.7 % (ref 3.5–5.7)
HCT VFR BLD AUTO: 44.9 % (ref 42–52)
HDLC SERPL-MCNC: 38 MG/DL (ref 40–60)
HGB BLD-MCNC: 14.1 G/DL (ref 14–18)
IRON SATN MFR SERPL: 23 % (ref 20–55)
IRON SERPL-MCNC: 59 UG/DL (ref 70–180)
LDLC SERPL CALC-MCNC: 57 MG/DL (ref 70–100)
LYMPHOCYTES # BLD AUTO: 2.22 10*3/UL (ref 1–5)
LYMPHOCYTES NFR BLD AUTO: 26.6 % (ref 20–45)
MCH RBC QN AUTO: 29.3 PG (ref 27–31)
MCHC RBC AUTO-ENTMCNC: 31.4 G/DL (ref 33–37)
MCV RBC AUTO: 93.2 FL (ref 80–96)
MONOCYTES # BLD AUTO: 0.55 10*3/UL (ref 0.2–1.2)
MONOCYTES NFR BLD AUTO: 6.6 % (ref 3–10)
NEUTROPHILS # BLD AUTO: 5.2 10*3/UL (ref 2–8)
NEUTROPHILS NFR BLD AUTO: 62.2 % (ref 30–85)
NRBC CBCN: 0 % (ref 0–0.7)
OSMOLALITY SERPL CALC.SUM OF ELEC: 291 MOSM/KG (ref 273–304)
PLATELET # BLD AUTO: 214 10*3/UL (ref 130–400)
PMV BLD AUTO: 10.2 FL (ref 9.4–12.4)
POTASSIUM SERPL-SCNC: 4.6 MMOL/L (ref 3.5–5.3)
RBC # BLD AUTO: 4.82 10*6/UL (ref 4.7–6.1)
SODIUM SERPL-SCNC: 140 MMOL/L (ref 135–147)
TIBC SERPL-MCNC: 259 UG/DL (ref 245–450)
TRANSFERRIN SERPL-MCNC: 181 MG/DL (ref 215–365)
TRIGL SERPL-MCNC: 234 MG/DL (ref 40–150)
TSH SERPL-ACNC: 1.19 M[IU]/L (ref 0.27–4.2)
URATE SERPL-MCNC: 5 MG/DL (ref 3.5–8.5)
VIT B12 SERPL-MCNC: 650 PG/ML (ref 211–911)
VLDLC SERPL-MCNC: 47 MG/DL (ref 5–37)
WBC # BLD AUTO: 8.35 10*3/UL (ref 4.8–10.8)

## 2021-03-12 ENCOUNTER — HOSPITAL ENCOUNTER (OUTPATIENT)
Dept: LAB | Facility: HOSPITAL | Age: 76
Discharge: HOME | End: 2021-03-12
Attending: NURSE PRACTITIONER

## 2021-03-12 LAB
ALBUMIN SERPL-MCNC: 4.2 G/DL (ref 3.5–5)
ALP SERPL-CCNC: 67 U/L (ref 56–155)
ALT SERPL-CCNC: 17 U/L (ref 10–40)
AST SERPL-CCNC: 19 U/L (ref 15–50)
BILIRUB SERPL-MCNC: 0.49 MG/DL (ref 0.2–1.3)
CONV BILI, CONJUGATED: <0.2 MG/DL (ref 0–0.6)
CONV TOTAL PROTEIN: 6.6 G/DL (ref 6.3–8.2)
CONV UNCONJUGATED BILIRUBIN: 0.3 MG/DL (ref 0–1.1)
FERRITIN SERPL-MCNC: 422 NG/ML (ref 30–300)
TRANSFERRIN SERPL-MCNC: 174 MG/DL (ref 215–365)

## 2021-03-17 LAB — CONV HEMOCHROMATOSIS MUTATION (C282Y,H63D,565C): NORMAL

## 2021-04-13 ENCOUNTER — OFFICE VISIT CONVERTED (OUTPATIENT)
Dept: ONCOLOGY | Facility: HOSPITAL | Age: 76
End: 2021-04-13
Attending: INTERNAL MEDICINE

## 2021-04-13 ENCOUNTER — HOSPITAL ENCOUNTER (OUTPATIENT)
Dept: ONCOLOGY | Facility: HOSPITAL | Age: 76
Discharge: HOME | End: 2021-04-13
Attending: INTERNAL MEDICINE

## 2021-04-16 ENCOUNTER — HOSPITAL ENCOUNTER (OUTPATIENT)
Dept: VACCINE CLINIC | Facility: HOSPITAL | Age: 76
Discharge: HOME | End: 2021-04-16
Attending: INTERNAL MEDICINE

## 2021-05-10 NOTE — PROCEDURES
Procedure Note      Patient Name: Asaf Chilel   Patient ID: [id number]   Sex: Male   YOB: 1945    Primary Care Provider: Concetta BERRIOS   Referring Provider: Concetta BERRIOS    Visit Date: January 14, 2021    Provider: Arnaldo Duvall MD   Location: [facility]   Location Address: [address] [place] [address], [address]   NIKKI Levi  [id number]   Location Phone: [phone]          FINAL REPORT   TRANSTHORACIC ECHOCARDIOGRAM REPORT    Diagnosis: CHF (Congestive Heart Failure)   Height: 6' Weight: 232 B/P: 156/75 BSA: 2.3   Tech: [employer]   MEASUREMENTS:  RVID (Diastole) : RVID. (NORMAL: 0.7 to 2.4 cm max)   LVID (Systole): 3.5 cm (Diastole): 4.9 cm . (NORMAL: 3.7 - 5.4 cm)   Posterior Wall Thickness (Diastole): 1.0 cm. (NORMAL: 0.8 - 1.1 cm)   Septal Thickness (Diastole): 1.0 cm. (NORMAL: 0.7 - 1.2 cm)   LAID (Systole): 3.9 cm. (NORMAL: 1.9 - 3.8 cm)   Aortic Root Diameter (Diastole): 3.4 cm. (NORMAL: 2.0 - 3.7 cm)   COMMENTS:  The patient underwent 2-D, M-Mode, and Doppler examination, including pulse-wave, continuous-wave, and color-flow analysis; the study is technically adequate.   FINDINGS:  MITRAL VALVE: Normal. E to F slope was normal. No evidence of any prolapse.   AORTIC VALVE: Mild fibrocalcific changes noted with mild limitation of opening of the aortic valve leaflets. Calcification is more in the right and left coronary cusps.   TRICUSPID VALVE: Normal.   PULMONIC VALVE: Normal.   LEFT ATRIUM: Normal; no masses seen. LA volume is 12 mL/m2.   AORTIC ROOT: Normal in size and motion.   LEFT VENTRICLE: The left ventricular chamber size is normal. The left ventricular wall thickness is normal. The left ventricular systolic function is mildly reduced with an estimated EF of 50%. No significant regional wall motion abnormalities are identified.   RIGHT ATRIUM: Normal.   RIGHT VENTRICLE: Normal size and function.   PERICARDIUM: No effusion.   INFERIOR VENA CAVA: Diameter is 1.6 cm with greater  than 50% reduction with inspiration.   DOPPLER: Pulse-wave, continuous wave, and color-flow Doppler evaluation was performed. E/A ratio is 0.7. DT= 225 msec. IVRT is 88 msec. E/E' is 10. No significant stenosis or regurgitation is identified.   Faxed: 01/18/2021      CONCLUSION:  1.  Left ventricular chamber size is normal. The left ventricular wall thickness is normal. The left ventricular        systolic function is mildly reduced with an estimated EF of 50%. No significant regional wall motion        abnormalities are identified.   2.  Left ventricular diastolic dysfunction.  3.  Fibrocalcific changes noted of the aortic valve without significant stenosis.       Arnaldo Duvall MD, [facility]  PM/pap                   Electronically Signed by: Peri Bales-, Other -Author on January 18, 2021 02:19:34 PM  Electronically Co-signed by: Arnaldo Duvall MD -Reviewer on January 18, 2021 03:18:35 PM

## 2021-05-10 NOTE — H&P
History and Physical      Patient Name: Asaf Chilel   Patient ID: [id number]   Sex: Male   YOB: 1945    Primary Care Provider: Concetta BERRIOS   Referring Provider: Concetta BERRIOS    Visit Date: December 15, 2020    Provider: YASH Musa   Location: [facility]   Location Address: [address] [place] [address], [address]  [place], [place]  [id number]   Location Phone: [phone]          Chief Complaint  · Difficulty with erection      History Of Present Illness  The patient is a 75 year old /White male , who presents on referral from Concetta BERRIOS , for an urological evaluation of ED. This has gone on for about a year. He has had not tried any medications for erectile dysfunction stating it has been present since having undergone surgical intervention and bilateral aortobifemoral bypass due to disabling claudication occlusive disease per review of op report. Patient concerned with size of penis seems smaller and he is concerned if he will be able to have erection even though he is not sexually active with spouse. Patient states not sure he wants any treatment just wanted to be able to have erection if needed or maybe in the future. He also has fatigue but poor historian. Medication list indicated he is on Synthroid. No diagnosis. He follows with Dr Kingsley endocrinologist and admits medications increased 1 month ago even though he reported everything was ok. Denies any voiding issues except urgency when having to void. Voids 3-4 times during the day.Denies dysuria or gross hematuria. No family history of prostate cancer. PSA0.81 11-9-2020.       Past Medical History  Anemia, iron deficiency; Anxiety; Gastroesophageal reflux; Gout; High cholesterol; Hip Pain; Hypertension; Limb Pain; Lumbago/low back pain; Peripheral vascular disease; Sciatica; Tremors         Past Surgical History  Colonoscopy; Shoulder surgery         Medication List  albuterol sulfate 2.5 mg /3 mL (0.083 %)  inhalation solution for nebulization; albuterol sulfate 90 mcg/actuation inhalation HFA aerosol inhaler; allopurinol 300 mg oral tablet; amitriptyline 25 mg oral tablet; aspirin 81 mg oral tablet,delayed release (DR/EC); atorvastatin 40 mg oral tablet; buspirone 10 mg oral tablet; Daily Multi-Vitamin oral tablet; ferrous sulfate 325 mg (65 mg iron) oral tablet; lisinopril 40 mg oral tablet; meclizine 25 mg oral tablet; OMEPRAZOLE DR 20 MG CAPSULE; propranolol 40 mg oral tablet; spironolactone 25 mg oral tablet; Synthroid 88 mcg oral tablet; Vascepa 1 gram oral capsule; Zyrtec 10 mg oral tablet         Allergy List  NO KNOWN DRUG ALLERGIES       Allergies Reconciled  Family Medical History  Cancer, Unspecified; Heart failure; Spine Problems         Social History  Active but no formal exercise; Alcohol (Never); ; No known infection risk; Tobacco (Former)         Immunizations  Name Date Admin   DTaP 11/27/2013   Influenza 10/24/2016   Influenza 10/06/2014   Influenza 10/01/2013   Pneumococcal 11/27/2013   Shingles 11/01/2009         Review of Systems  · Constitutional  o Denies  o : fever, headache, chills  · Eyes  o Admits  o : impaired vision  o Denies  o : eye pain, double vision, blurred vision  · HENT  o Admits  o : sinus problems, sore throat  o Denies  o : ear infection  · Cardiovascular  o Admits  o : high blood pressure  o Denies  o : chest pain, varicosities  · Respiratory  o Admits  o : wheezing, frequent cough  o Denies  o : shortness of breath  · Gastrointestinal  o Denies  o : nausea, vomiting, heartburn, indigestion, abdominal pain  · Genitourinary  o Denies  o : urgency, frequency, nocturia, hematuria, urinary retention, painful urination  · Integument  o Denies  o : rash, itching, boils  · Neurologic  o Denies  o : tingling or numbness, tremors, dizzy spells  · Musculoskeletal  o Admits  o : joint pain  o Denies  o : neck pain, back pain  · Endocrine  o Denies  o : cold intolerance, heat  intolerance, tired, excessive thirst, sluggish  · Psychiatric  o Admits  o : feels satisfied with life  o Denies  o : severe depression, concerns with hurting themselves  · Heme-Lymph  o Denies  o : swollen glands, blood clotting problems  · Allergic-Immunologic  o Admits  o : sinus allergy symptoms  o Denies  o : hay fever      Vitals  Date Time BP Position Site L\R Cuff Size HR RR TEMP (F) WT  HT  BMI kg/m2 BSA m2 O2 Sat FR L/min FiO2        12/15/2020 10:07 /75 Sitting    63 - R  97.6 230lbs 0oz 6'   31.19 2.3             Physical Examination  · Constitutional  o Appearance  o : well nourished, well developed, alert, oriented, in no acute distress, well-tended appearance,   · Respiratory  o Respiratory Effort  o : breathing unlabored  o Inspection of Chest  o : normal appearance, no retractions  o Auscultation of Lungs  o : normal breath sounds throughout  · Cardiovascular  o Heart  o :   § Auscultation of Heart  § : regular rate and rhythm, no murmurs, gallops or rubs  o Peripheral Vascular System  o : No abnormalities  · Gastrointestinal  o Abdominal Examination  o : abdomen nontender to palpation, normal bowel sounds, tone normal without rigidity or guarding, no masses present, no abdominal bruits present, no incisions present, abdominal contour scaphoid, abdomen nondistended  o Liver and spleen  o : no hepatomegaly present, liver nontender to palpation, spleen not palpable, no splenic tenderness present  o Hernias  o : small ventral hernia incisional defect  · Genitourinary  o Bladder  o : no abnormalities  o Penis  o : uncircumcised with erythema and small amount exudate around corona glans penis. balanitis no masses present  o Urethral Meatus  o : no abnormalities  o Scrotum and Scrotal Contents  o :   § Scrotum  § : no abnormalities  § Epididymides  § : no abnormalities  § Testes  § : no abnormalities  o Digital Rectal Examination  o :   § Prostate  § : nontender to palpation, size 20g, no nodules  present, consistency normal  · Lymphatic  o Groin  o : no lymphadenopathy present  · Neurologic  o Mental Status Examination  o : grossly oriented to person, place and time  o Gait and Station  o : normal gait, able to stand without difficulty  · Psychiatric  o Mood and Affect  o : mood normal, affect appropriate      Figure 1.0: Pain Rating Scale-Alberto         Results  · In-Office Procedures  o Lab procedure  § Automated dipstick urinalysis with microscopy ([id number])   § Color Ur: Yellow   § Clarity Ur: Clear   § Glucose Ur Ql Strip: Negative   § Bilirub Ur Ql Strip: Negative   § Ketones Ur Ql Strip: Negative   § Sp Gr Ur Qn: 1.015   § Hgb Ur Ql Strip: Negative   § pH Ur-LsCnc: 5.5   § Prot Ur Ql Strip: Negative   § Urobilinogen Ur Strip-mCnc: 0.2 E.U./dL   § Nitrite Ur Ql Strip: Negative   § WBC Est Ur Ql Strip: Trace   § RBC UrnS Qn HPF: 0   § WBC UrnS Qn HPF: 0   § Bacteria UrnS Qn HPF: 0   § Crystals UrnS Qn HPF: 0   § Epithelial Cells (non renal): 0 /HPF  § Epithelial Cells (renal): 0       Assessment  · Balanitis     607.1/N48.1  · Erectile dysfunction     607.84/N52.9  · Peripheral vascular disease     443.9/I73.9  · BPH (benign prostatic hyperplasia)     600.00/N40.0  · History of aortic valve replacement with composite graft     V43.3/Z95.4  · History of aortoiliofemoral vascular bypass     V15.1/Z95.828  · History of coma     V15.89/Z87.898  · Hypothyroid     244.9/E03.9      Plan  · Medications  o clotrimazole-betamethasone 1-0.05 % topical cream   SIG: apply to the affected and surrounding areas of skin by topical route 2 times per day in the morning and evening to affected area as needed   DISP: (1) Tube with 2 refills  Prescribed on 12/15/2020     o Viagra 100 mg oral tablet   SIG: take 1/2 to 1 tablet (100 mg) by oral route once daily as needed approximately 1 hour before sexual activity take on empty stomach   DISP: (20) Tablet with 0 refills  Prescribed on 12/15/2020     o Medications have been  "Reconciled     discussed erectile dysfunction and causes. He has significant vascular issues and relayed that may be the main factor and also side effect of antihypertensive and anxiety medications. His main concern is ability to have an erection if desired and \"shrinking of penis\". all discussed. All treatment options discussed and he wishes to get prescription for Viagra. his spouse is not wanting sexual activity and he just wants knowledge if can get erection. He has fatigue but has problems with thyroid, anemia and other medical complaints. medication for balanitis and will see patient as needed. brochure on penile vac device will provided. patient does not want to consider pge injections on penile implant. understands if significant vascular insufficiency and history bypass graft, pde5 I may not be effective.    also relayed to patient that testosterone is not a treatment for erectile dysfunction. Does not resolve vascular insufficiency  and associated cardiovascular risks if ever a candidate. patient is not interested in testosterone therapy and would need cardiac clearance.             Electronically Signed by: YASH Musa -Author on December 15, 2020 02:33:46 PM  "

## 2021-05-10 NOTE — H&P
History and Physical      Patient Name: Asaf Chilel   Patient ID: [id number]   Sex: Male   YOB: 1945    Primary Care Provider: Concetta BERRIOS   Referring Provider: Concetta BERRIOS    Visit Date: December 16, 2020    Provider: YAHS Mcbride   Location: [facility]   Location Address: [address] [place] [address], [address] [place] [address]  [id number]   Location Phone: [phone]          Chief Complaint     1.  Voice hoarseness       History Of Present Illness  Asaf Chilel is a 75 year old /White male who presents to the office today as a consult from Concetta BERRIOS.      He presents the clinic today for evaluation of voice hoarseness.  He reports over the past month he has been experiencing voice hoarseness that occurs 2-3 times per week.  He states that he will be hoarse for a couple of hours and then will resolve.  He states that he typically wakes up in the morning with a normal voice and then as the day goes on he will begin to get worse.  He denies any issues with dysphagia, pain or globus sensation.  He denies any issues with chronic rhinitis or postnasal drainage.  He does have gastroesophageal reflux disease but this is well controlled on omeprazole daily.  He denies have any breakthrough symptoms.  He has not had any intubations performed in the last year.  He is a former smoker, quitting in 2000.  His weight has been stable and overall he feels well.  He has not had any imaging.       Past Medical History  Anemia, iron deficiency; Anxiety; Gastroesophageal reflux; Gout; High cholesterol; Hip Pain; Hoarseness, chronic; Hypertension; Limb Pain; Lumbago/low back pain; Peripheral vascular disease; Sciatica; Tremors         Past Surgical History  Colonoscopy; Shoulder surgery         Medication List  allopurinol 300 mg oral tablet; amitriptyline 25 mg oral tablet; aspirin 81 mg oral tablet,delayed release (DR/EC); atorvastatin 40 mg oral tablet; buspirone 10 mg  oral tablet; clotrimazole-betamethasone 1-0.05 % topical cream; Daily Multi-Vitamin oral tablet; ferrous sulfate 325 mg (65 mg iron) oral tablet; lisinopril 40 mg oral tablet; meclizine 25 mg oral tablet; OMEPRAZOLE DR 20 MG CAPSULE; propranolol 40 mg oral tablet; spironolactone 25 mg oral tablet; Synthroid 88 mcg oral tablet; Vascepa 1 gram oral capsule; Viagra 100 mg oral tablet         Allergy List  NO KNOWN DRUG ALLERGIES         Family Medical History  Spine Problems; Family history of cancer; Family history of heart failure         Social History  Active but no formal exercise; Alcohol (Never); ; No known infection risk; Tobacco (Former)         Immunizations  Name Date Admin   DTaP 11/27/2013   Influenza 10/24/2016   Influenza 10/06/2014   Influenza 10/01/2013   Pneumococcal 11/27/2013   Shingles 11/01/2009         Review of Systems  · Constitutional  o Denies  o : fever, night sweats, weight loss  · Eyes  o Admits  o : impaired vision  o Denies  o : discharge from eye  · HENT  o Admits  o : *See HPI  · Cardiovascular  o Denies  o : chest pain, irregular heart beats  · Respiratory  o Admits  o : wheezing  o Denies  o : shortness of breath, coughing up blood  · Gastrointestinal  o Denies  o : heartburn, reflux, vomiting blood  · Genitourinary  o Denies  o : frequency  · Integument  o Denies  o : rash, skin dryness  · Neurologic  o Admits  o : loss of balance  o Denies  o : seizures, loss of consciousness, dizziness  · Endocrine  o Denies  o : cold intolerance, heat intolerance  · Heme-Lymph  o Denies  o : easy bleeding, anemia      Vitals  Date Time BP Position Site L\R Cuff Size HR RR TEMP (F) WT  HT  BMI kg/m2 BSA m2 O2 Sat FR L/min FiO2        12/16/2020 09:28 AM        97.8 232lbs 8oz 6'   31.53 2.31             Physical Examination  · Constitutional  o Appearance  o : well developed, well-nourished, alert and in no acute distress, voice clear and strong  · Head and Face  o Head  o :    § Inspection  § : no deformities or lesions  o Face  o :   § Inspection  § : No facial lesions; House-Brackmann I/VI bilaterally  § Palpation  § : No TMJ crepitus nor  muscle tenderness bilaterally  · Eyes  o Vision  o :   § Visual Fields  § : Extraocular movements are intact. No spontaneous or gaze-induced nystagmus.  o Conjunctivae  o : clear  o Sclerae  o : clear  o Pupils and Irises  o : pupils equal, round, and reactive to light.   · Ears, Nose, Mouth and Throat  o Ears  o :   § External Ears  § : appearance within normal limits, no lesions present  § Otoscopic Examination  § : Bilateral cerumen impactions, removed under the microscope using suction and alligator forceps, tympanic membrane appearance within normal limits bilaterally without perforations, well-aerated middle ears  § Hearing  § : intact to conversational voice both ears  o Nose  o :   § External Nose  § : appearance normal  § Intranasal Exam  § : mucosa within normal limits, vestibules normal, no intranasal lesions present, septum midline, sinuses non tender to percussion  o Oral Cavity  o :   § Oral Mucosa  § : oral mucosa normal without pallor or cyanosis  § Lips  § : lip appearance normal  § Teeth  § : normal dentition for age  § Gums  § : gums pink, non-swollen, no bleeding present  § Tongue  § : tongue appearance normal; normal mobility  § Palate  § : hard palate normal, soft palate appearance normal with symmetric mobility  o Throat  o :   § Oropharynx  § : no inflammation or lesions present, tonsils within normal limits  § Hypopharynx  § : appearance within normal limits, superior epiglottis within normal limits  § Larynx  § : appearance within normal limits, vocal fold atrophy bilaterally, no lesions present  o Nasopharyngoscopy  o : The patients nares were anesthetized with Lidocaine with Afrin. After this was done, the flexible nasopharyngoscope was passed through the left side. The nose is free of any lesions, masses,  polyps or purulence. The base of tongue, vallecula, arytenoid cartilages and piriform sinuses are all normal in appearance. There are no lesions or masses. Bilateral true false vocal folds abducted and abducted normally however they are atrophied bilaterally indicating presbylarynx. Patient tolerated the procedure well.  · Neck  o Inspection/Palpation  o : normal appearance, no masses or tenderness, trachea midline; thyroid size normal, nontender, no nodules or masses present on palpation  · Respiratory  o Respiratory Effort  o : breathing unlabored  o Inspection of Chest  o : normal appearance, no retractions  · Lymphatic  o Neck  o : no lymphadenopathy present  o Supraclavicular Nodes  o : no lymphadenopathy present  o Preauricular Nodes  o : no lymphadenopathy present  · Skin and Subcutaneous Tissue  o General Inspection  o : Regarding face and neck - there are no rashes present, no lesions present, and no areas of discoloration  · Neurologic  o Cranial Nerves  o : cranial nerves II-XII are grossly intact bilaterally  o Gait and Station  o : normal gait, able to stand without diffculty  · Psychiatric  o Judgement and Insight  o : judgment and insight intact  o Mood and Affect  o : mood normal, affect appropriate          Assessment  · Voice hoarseness     784.42/R49.0  · Impacted cerumen, bilateral     380.4/H61.23  · 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 He reports over the past month he has been experiencing voice hoarseness that occurs 2-3 times per week. He states that he will be hoarse for a couple of hours and then will resolve. He states that he typically wakes up in the morning  with a normal voice and then as the day goes on he will begin to get worse. He denies any issues with dysphagia, pain or globus sensation. He denies any issues with chronic rhinitis or postnasal drainage. He does have gastroesophageal reflux disease but this is well controlled on omeprazole daily. He denies have any breakthrough symptoms. He has not had any intubations performed in the last year. He is a former smoker, quitting in 2000. His weight has been stable and overall he feels well. He has not had any imaging. On examination today he does have bilateral cerumen impactions. I am able to clear this under the microscope using suction and alligator forceps. I did perform a flexible laryngoscopy.The patients nares were anesthetized with Lidocaine with Afrin. After this was done, the flexible nasopharyngoscope was passed through the left side. The nose is free of any lesions, masses, polyps or purulence. The base of tongue, vallecula, arytenoid cartilages and piriform sinuses are all normal in appearance. There are no lesions or masses. Bilateral true false vocal folds abducted and abducted normally however they are atrophied bilaterally indicating presbylarynx. Patient tolerated the procedure well. We discussed the findings on exam and have explained to him that his voice hoarseness is likely related to the atrophy of his vocal folds. I do not see any lesions or masses today. We discussed treatment options including vocal fold augmentation or speech therapy. At this time he would like to defer any treatment. I would like to see him back if he has worsening symptoms, throat pain, difficulty swallowing or globus sensation. I will also see him back should he need cerumen impaction removal performed again.  o Electronically Identified Patient Education Materials Provided Electronically  · Correspondence  o ENT Letter to Referring MD (Concetta BERRIOS) - 12/16/2020            Electronically Signed by: Rufina Winter  APRN -Author on December 16, 2020 10:30:03 AM

## 2021-05-12 NOTE — PROGRESS NOTES
Progress Note      Patient Name: Asaf Chilel   Patient ID: [id number]   Sex: Male   YOB: 1945    Primary Care Provider: Concetta BERRIOS   Referring Provider: Concetta BERRIOS    Visit Date: April 29, 2020    Provider: YASH Gagnon   Location: [facility]   Location Address: [address] [place] [address], [address]  [place], [place]  [id number]   Location Phone: [phone]          Chief Complaint  · WHEEZING      History Of Present Illness  Video Conferencing Visit  Asaf Chilel is a 74 year old /White male who is presenting for evaluation via video conferencing. Verbal consent obtained before beginning visit.   The following staff were present during this visit: Concetta BERRIOS   Asaf Chilel is a 74 year old /White male who presents for evaluation and treatment of:      Patient is here on [employer] video w c/c of wheezing getting worse. Says his ProAir inhaler don't seem to work anymore. Admits wheezing since Jan-states it has gotten worse. Denies temp, cough, sinus drainage/pressure. Denies allergy symptoms. Denies hx of smoking. Denies hx of Asthma or Copd. Denies SOB, wt gain, difficulty laying flat.    blockage on the right side-states he went [place] [employer] [place] and he had a carotid doppler-states he saw  in the past for an aortic blockage he had an  aortobifemoral  bypasss 5 yrs ago.    15 min spent via [employer] video for appt.           Past Medical History  Disease Name Date Onset Notes   Anemia, iron deficiency 05/07/2015 --    Anxiety --  --    Gastroesophageal reflux --  --    Gout --  --    High cholesterol --  --    Hip Pain --  --    Hypertension --  --    Limb Pain --  --    Lumbago/low back pain 09/03/2013 MRI shows abnormal appearance of bone marrow. Workup negative per Hem/onc.   Peripheral vascular disease 03/28/2015 --    Sciatica 09/03/2013 --    Tremors --  --          Past Surgical History  Procedure Name Date Notes    Shoulder surgery --  --          Medication List  Name Date Started Instructions   albuterol sulfate 90 mcg/actuation inhalation HFA aerosol inhaler 04/09/2020 INHALE 1 TO 2 PUFFS BY MOUTH EVERY 6 HOURS AS NEEDED   allopurinol 300 mg oral tablet 10/29/2019 TAKE ONE TABLET BY MOUTH ONE TIME A DAY   amitriptyline 25 mg oral tablet 11/29/2019 TAKE 1 TABLET BY MOUTH ONCE DAILY AT BEDTIME   aspirin 81 mg oral tablet,delayed release (DR/EC) 11/27/2013 take 1 tablet by oral route daily for 90 days   atorvastatin 40 mg oral tablet 01/09/2020 TAKE 1 TABLET BY MOUTH EVERYDAY AT BEDTIME   buspirone 10 mg oral tablet 06/17/2019 TAKE 1 TABLET BY ORAL ROUTE 3 TIMES A DAY FOR 90 DAYS   Daily Multi-Vitamin oral tablet 11/27/2013 take 1 tablet by oral route daily for 90 days   Fish Oil oral  --    Lipitor 40 mg oral tablet 09/24/2019 TAKE 1 TABLET BY MOUTH EVERYDAY AT BEDTIME   lisinopril 40 mg oral tablet 10/28/2019 TAKE 1 TABLET BY MOUTH EVERY DAY   meclizine 25 mg oral tablet 11/01/2019 TAKE 1 TABLET BY MOUTH FOUR TIMES A DAY   omeprazole 20 mg oral capsule,delayed release(DR/EC) 02/05/2020 TAKE 2 CAPSULES (40 MG) BY ORAL ROUTE ONCE DAILY BEFORE A MEAL FOR 90 DAYS   ProAir HFA 90 mcg/actuation inhalation HFA aerosol inhaler 01/31/2020 inhale 1 - 2 puffs (90 - 180 mcg) by inhalation route every 6 hours as needed   propranolol 40 mg oral tablet 01/15/2020 TAKE 1 TABLET BY MOUTH 3 TIMES PER DAY   spironolactone 25 mg oral tablet 07/08/2019 TAKE 1 TABLET BY MOUTH EVERY OTHER DAY, OR AS DIRECTED   Synthroid 50 mcg oral tablet  take 1 tablet (50 mcg) by oral route once daily   Zyrtec 10 mg oral tablet 02/24/2017 take 1 tablet (10 mg) by oral route once daily prn allergies         Allergy List  Allergen Name Date Reaction Notes   NO KNOWN DRUG ALLERGIES --  --  --          Family Medical History  Disease Name Relative/Age Notes   Cancer, Unspecified  --    Heart failure  --    Spine Problems  --          Social History  Finding Status  Start/Stop Quantity Notes   Active but no formal exercise --  --/-- --  --    Alcohol Never --/-- --  04/29/2020 - 01/31/2020 -     --  --/-- --  --    No known infection risk --  --/-- --  --    Tobacco Former 16/52 1 pack Quit smoking 20 yrs ago         Immunizations  NameDate Admin Mfg Trade Name Lot Number Route Inj VIS Given VIS Publication   DTaP11/27/2013 [facility] TRIPEDIA [id number] IM LD 11/27/2013    Comments: tolerated well.   Hburmuhlu90/24/2016 SKB Fluarix, quadrivalent, preservative free [id number] IM RD 10/24/2016 08/07/2015   Comments: tolerated well   Ulnndtjfo87/06/2014 SK[employer] Fluarix, quadrivalent, preservative free [id number] IM RD 10/06/2014 07/02/2012   Comments: Pt tolerated well   Fegjwqzbh58/01/2013 SKB Fluarix-PF > 3 Years [id number] IM NE 11/27/2013 07/02/2012   Comments:    Ecslkojjyibi42/27/2013 MSD PNEUMOVAX 23 [id number] IM RD 11/27/2013 10/06/2009   Comments: tolerated well.   Eceqkiku66/01/2009 MSD ZOSTAVAX  SC NE 11/27/2013    Comments:          Review of Systems  · Constitutional  o Denies  o : fever, fatigue, weight loss, weight gain  · Cardiovascular  o Denies  o : lower extremity edema, claudication, chest pressure, palpitations  · Respiratory  o Admits  o : wheezing  o Denies  o : shortness of breath, cough, hemoptysis, dyspnea on exertion  · Gastrointestinal  o Denies  o : nausea, vomiting, diarrhea, constipation, abdominal pain      Physical Examination  · Constitutional  o Appearance  o : alert, in no acute distress, well developed, well-nourished  · Skin and Subcutaneous Tissue  o General Inspection  o : no rashes, normal skin color, warm and dry  · Neurologic  o Mental Status Examination  o : alert and oriented to time, place, and person. Gait and Station: normal gait, able to stand without difficulty  · Psychiatric  o Judgement and Insight  o : judgment and insight intact  o Mood and Affect  o : normal mood and affect          Assessment  · Carotid artery calcification     433.10/I65.29  admits  he went to a screening at the [employer] in Feb-he had a carotid doppler which showed blockage on the right-it was recommended at that time he follow up w/vascular-he reports he tried but the vascular surgeon in [facility] had left. Ordered vascular consult and carotid doppler.   · Wheezing     786.07/R06.2  c/o wheezing x 3 months that is worsening. Denies temp, cough, sinus drainage/pressure. Denies allergy symptoms. Denies hx of smoking. Denies hx of Asthma or Copd. Chest x-ray performed on 1/31/20 which was WNL. Ordered repeat chest x-ray today as well as PFT. Admits he has been using his albuteron 2x day but does not feel it is effective. [facility] in the past which was more effective. Prescribed Atrovent today.      Plan  · Orders  o ACO-39: Current medications updated and reviewed () - - 04/29/2020  o ACO-14: Influenza immunization administered or previously received () - - 04/29/2020  o Chest xray 2 views [facility] Preferred View ([id number]) - 786.07/R06.2 - 04/29/2020  o PFT Complete/Full Study (including pre/post) [facility] ([id number], [id number], [id number]) - 786.07/R06.2 - 04/29/2020  o Carotid Doppler Bilateral [facility] ([id number]) - 433.10/I65.29 - 04/29/2020  o VASCULAR SURGERY CONSULTATION ([facility]) - 433.10/I65.29 - 04/29/2020  · Medications  o Atrovent HFA 17 mcg/actuation inhalation HFA aerosol inhaler   SIG: inhale 2 puffs (34 mcg) by inhalation route 4 times per day   DISP: (1) 12.9 gm aer w/adap with 0 refills  Prescribed on 04/29/2020     o Medications have been Reconciled  o Transition of Care or Provider Policy  · Instructions  o Take all medications as prescribed/directed.  o Patient was educated/instructed on their diagnosis, treatment and medications prior to discharge from the clinic today.  o Patient instructed to seek medical attention urgently for new or worsening symptoms.  o Call the office with any concerns or questions.  o Discussed Covid-19 precautions including, but  not limited to, social distancing, avoid touching your face, and hand washing.   · Disposition  o Call or Return if symptoms worsen or persist.            Electronically Signed by: YASH Gagnon -Author on April 29, 2020 12:31:37 PM

## 2021-05-13 NOTE — PROGRESS NOTES
Progress Note      Patient Name: Asaf Chilel   Patient ID: [id number]   Sex: Male   YOB: 1945    Primary Care Provider: Concetta BERRIOS   Referring Provider: Concetta BERRIOS    Visit Date: June 24, 2020    Provider: YASH Gagnon   Location: [facility]   Location Address: [address] [place] [address], [address]  [place], [place]  [id number]   Location Phone: [phone]          Chief Complaint  · LOWER BACK PAIN      History Of Present Illness  Asaf Chilel is a 75 year old /White male who presents for evaluation and treatment of:      Patient is here today w c/c of lower back pain for 3 weeks. He fell on his back when he got knocked down by a cow. Denies numbness/tingling down legs. Denies changes to the bowel or bladder. He has been taking aleve for the back pain. Hx of back pain-he had LESI in the past, has also seen a chiropractor. He would like to try PT.      Trelegy working well for wheezing. [facility] for PFT next month    vascular surgeon [facility] next month.    [facility] for cataract surgery next Monday via .    [facility] w/cardiology in Aug           Past Medical History  Disease Name Date Onset Notes   Anemia, iron deficiency 05/07/2015 --    Anxiety --  --    Gastroesophageal reflux --  --    Gout --  --    High cholesterol --  --    Hip Pain --  --    Hypertension --  --    Limb Pain --  --    Lumbago/low back pain 09/03/2013 MRI shows abnormal appearance of bone marrow. Workup negative per Hem/onc.   Peripheral vascular disease 03/28/2015 --    Sciatica 09/03/2013 --    Tremors --  --          Past Surgical History  Procedure Name Date Notes   Shoulder surgery --  --          Medication List  Name Date Started Instructions   albuterol sulfate 2.5 mg /3 mL (0.083 %) inhalation solution for nebulization 05/13/2020 inhale 3 milliliters (2.5 mg) by nebulization route 3 times per day as needed   albuterol sulfate 90 mcg/actuation inhalation HFA aerosol inhaler 04/09/2020 INHALE 1 TO 2  PUFFS BY MOUTH EVERY 6 HOURS AS NEEDED   allopurinol 300 mg oral tablet 10/29/2019 TAKE ONE TABLET BY MOUTH ONE TIME A DAY   amitriptyline 25 mg oral tablet 06/24/2020 TAKE 1 TABLET BY MOUTH ONCE DAILY AT BEDTIME   aspirin 81 mg oral tablet,delayed release (DR/EC) 11/27/2013 take 1 tablet by oral route daily for 90 days   atorvastatin 40 mg oral tablet 01/09/2020 TAKE 1 TABLET BY MOUTH EVERYDAY AT BEDTIME   Atrovent HFA 17 mcg/actuation inhalation HFA aerosol inhaler 04/29/2020 inhale 2 puffs (34 mcg) by inhalation route 4 times per day   buspirone 10 mg oral tablet 06/24/2020 TAKE 1 TABLET BY ORAL ROUTE 3 TIMES A DAY FOR 90 DAYS   Daily Multi-Vitamin oral tablet 11/27/2013 take 1 tablet by oral route daily for 90 days   Fish Oil oral  --    Lipitor 40 mg oral tablet 09/24/2019 TAKE 1 TABLET BY MOUTH EVERYDAY AT BEDTIME   lisinopril 40 mg oral tablet 10/28/2019 TAKE 1 TABLET BY MOUTH EVERY DAY   meclizine 25 mg oral tablet 05/15/2020 TAKE 1 TABLET BY MOUTH FOUR TIMES A DAY   omeprazole 20 mg oral capsule,delayed release(DR/EC) 02/05/2020 TAKE 2 CAPSULES (40 MG) BY ORAL ROUTE ONCE DAILY BEFORE A MEAL FOR 90 DAYS   ProAir HFA 90 mcg/actuation inhalation HFA aerosol inhaler 01/31/2020 inhale 1 - 2 puffs (90 - 180 mcg) by inhalation route every 6 hours as needed   propranolol 40 mg oral tablet 01/15/2020 TAKE 1 TABLET BY MOUTH 3 TIMES PER DAY   spironolactone 25 mg oral tablet 05/18/2020 TAKE 1 TABLET BY MOUTH EVERY OTHER DAY, OR AS DIRECTED   Synthroid 50 mcg oral tablet  take 1 tablet (50 mcg) by oral route once daily   Trelegy Ellipta 100-62.5-25 mcg inhalation blister with device 06/11/2020 inhale 1 puff by inhalation route once daily at the same time each day   Zyrtec 10 mg oral tablet 02/24/2017 take 1 tablet (10 mg) by oral route once daily prn allergies         Allergy List  Allergen Name Date Reaction Notes   NO KNOWN DRUG ALLERGIES --  --  --        Allergies Reconciled  Family Medical History  Disease Name  Relative/Age Notes   Cancer, Unspecified  --    Heart failure  --    Spine Problems  --          Social History  Finding Status Start/Stop Quantity Notes   Active but no formal exercise --  --/-- --  --    Alcohol Never --/-- --  06/24/2020 - 04/29/2020 - 01/31/2020 -     --  --/-- --  --    No known infection risk --  --/-- --  --    Tobacco Former 16/52 1 pack Quit smoking 20 yrs ago         Immunizations  NameDate Admin Mfg Trade Name Lot Number Route Inj VIS Given VIS Publication   DTaP11/27/2013 [facility] TRIPEDIA [id number] IM LD 11/27/2013    Comments: tolerated well.   Xulsrxhvm05/24/2016 SKB Fluarix, quadrivalent, preservative free [id number] IM RD 10/24/2016 08/07/2015   Comments: tolerated well   Wuzvwsphj97/06/2014 SKB Fluarix, quadrivalent, preservative free [id number] IM RD 10/06/2014 07/02/2012   Comments: Pt tolerated well   Neengnezl29/01/2013 SKB Fluarix-PF > 3 Years [id number]  NE 11/27/2013 07/02/2012   Comments:    Otxndtpnnwfw29/27/2013 MSD PNEUMOVAX 23 [id number] IM RD 11/27/2013 10/06/2009   Comments: tolerated well.   Zeftiuah91/01/2009 MSD ZOSTAVAX  SC NE 11/27/2013    Comments:          Review of Systems  · Constitutional  o Denies  o : fever, fatigue, weight loss, weight gain  · Cardiovascular  o Denies  o : lower extremity edema, claudication, chest pressure, palpitations  · Respiratory  o Denies  o : shortness of breath, wheezing, cough, hemoptysis, dyspnea on exertion  · Gastrointestinal  o Denies  o : nausea, vomiting, diarrhea, constipation, abdominal pain  · Musculoskeletal  o Admits  o : limitation of motion, back pain      Vitals  Date Time BP Position Site L\R Cuff Size HR RR TEMP (F) WT  HT  BMI kg/m2 BSA m2 O2 Sat        06/24/2020 11:29 /71 Sitting    72 - R   230lbs 4oz 6'   31.23 2.3 94 %          Physical Examination  · Constitutional  o Appearance  o : alert, in no acute distress, well developed, well-nourished  · Neck  o Thyroid  o : no thyomegaly, no palpabale masses    · Respiratory  o Auscultation of Lungs  o : normal breath sounds throughout, no wheeze, rhonchi, or crackles  · Cardiovascular  o Heart  o : Regular rate and rhythm, Normal S1,S2 , No cardiac murmers, No S3 or S4 gallop or rubs  · Skin and Subcutaneous Tissue  o General Inspection  o : no rashes, normal skin color, warm and dry  o Digits and Nails  o : no clubbing, cyanosis, deformities or edema present, normal appearing nails  · Neurologic  o Mental Status Examination  o : alert and oriented to time, place, and person. Gait and Station: normal gait, able to stand without difficulty  · Psychiatric  o Judgement and Insight  o : judgment and insight intact  o Mood and Affect  o : normal mood and affect  · Back  o Inspection  o : no gross deformities  o Palpation  o : no tenderness to palpation, no swelling  o Range of Motion  o : normal range of motion  o Reflexes  o : normal reflexes  o Gait  o : normal gait  o Special Tests  o : negative straight leg raise          Assessment  · Lumbago/low back pain     724.2/M54.5  c/o lower back pain x3 weeks. Reports he fell on his back when he got knocked down by a cow. Denies numbness/tingling down legs. Denies changes to the bowel or bladder. He has been taking aleve for the back pain. Hx of back pain-he had LESI in the past, has also seen a chiropractor. Ordered PT consult. Prescribed diclofenac 50mg bid prn pain. If his symptoms persist discussed doing lumbar x-ray.  · Wheezing     786.07/R06.2  reports wheezing resolved w/ use of Trelegy. [facility] for PFT next month. Reports the Trelegy works well but expensive. Sample of Trelegy given at [facility] today.      Plan  · Orders  o ACO-39: Current medications updated and reviewed () - - 06/24/2020  o ACO-14: Influenza immunization administered or previously received () - - 06/24/2020  o PHYSICAL THERAPY CONSULTATION ([facility]) - 724.2/M54.5 - 06/24/2020   [facility]  · Medications  o diclofenac sodium 50 mg oral  tablet,delayed release (DR/EC)   SIG: take 1 tablet (50 mg) by oral route 2 times per day prn with food   DISP: (30) tablets with 0 refills  Prescribed on 06/24/2020     o amitriptyline 25 mg oral tablet   SIG: TAKE 1 TABLET BY MOUTH ONCE DAILY AT BEDTIME   DISP: (90) Tablet with 2 refills  Adjusted on 06/24/2020     o buspirone 10 mg oral tablet   SIG: TAKE 1 TABLET BY ORAL ROUTE 3 TIMES A DAY FOR 90 DAYS   DISP: (270) Tablet with 3 refills  Refilled on 06/24/2020     o Medications have been Reconciled  o Transition of Care or Provider Policy  · Instructions  o Take all medications as prescribed/directed.  o Patient was educated/instructed on their diagnosis, treatment and medications prior to discharge from the clinic today.  o Patient instructed to seek medical attention urgently for new or worsening symptoms.  o Call the office with any concerns or questions.  o Discussed Covid-19 precautions including, but not limited to, social distancing, avoid touching your face, and hand washing.   · Disposition  o Call or Return if symptoms worsen or persist.            Electronically Signed by: YASH Gagnon -Author on June 27, 2020 10:15:01 PM

## 2021-05-13 NOTE — PROGRESS NOTES
Progress Note      Patient Name: Asaf Chilel   Patient ID: [id number]   Sex: Male   YOB: 1945    Primary Care Provider: Concetta BERRIOS   Referring Provider: Concetta BERRIOS    Visit Date: December 4, 2020    Provider: YASH Jacques   Location: [facility]   Location Address: [address] [place] [address], [address]  [place], [place]  [id number]   Location Phone: [phone]          Chief Complaint  · ED ISSUES      History Of Present Illness  Video Conferencing Visit  Asaf Chilel is a 75 year old /White male who is presenting for evaluation via video conferencing via FORMAT OF VIDEO VISIT. Verbal consent obtained before beginning visit.   The following staff were present during this visit: YASH JACQUES   Asaf Chilel is a 75 year old /White male who presents for evaluation and treatment of:      Patient is here today with cc of ED issues. Reports his penis has drawn up to almost to nothing. Admits he has noticed it since his aortic bypass 4-5 yrs. Denies morning erection since the bypass. He would like to see urology to address his concern. Referral placed to .    hoarseness-reports he has been hoarse for about a month-states it last for about 30 min. Admits hx of smoking over 20 yrs ago. Denies allergy symptoms.  Reports it happens 2-3 times per day. He has been drinking honey which improves it. Ordered ENT consult.    Covid in Oct-states he had trouble swallowing one day but other than that he was fine.     renal insuff-reports cardiology notified him of his labs which showed renal insuff-creatinine 1.41 and gfr 47-his renal function was normal in June. Denies use of NSAIDS. Ordered repeat in 1wk-instructed to drink plenty of fluids and continue avoiding NSAIDS.       Past Medical History  Disease Name Date Onset Notes   Anemia, iron deficiency 05/07/2015 --    Anxiety --  --    Gastroesophageal reflux --  --    Gout --  --    High  cholesterol --  --    Hip Pain --  --    Hypertension --  --    Limb Pain --  --    Lumbago/low back pain 09/03/2013 MRI shows abnormal appearance of bone marrow. Workup negative per Hem/onc.   Peripheral vascular disease 03/28/2015 --    Sciatica 09/03/2013 --    Tremors --  --          Past Surgical History  Procedure Name Date Notes   Colonoscopy 2014 normal Dr. Bingham   Shoulder surgery --  --          Medication List  Name Date Started Instructions   albuterol sulfate 2.5 mg /3 mL (0.083 %) inhalation solution for nebulization 05/13/2020 inhale 3 milliliters (2.5 mg) by nebulization route 3 times per day as needed   albuterol sulfate 90 mcg/actuation inhalation HFA aerosol inhaler 04/09/2020 INHALE 1 TO 2 PUFFS BY MOUTH EVERY 6 HOURS AS NEEDED   allopurinol 300 mg oral tablet 11/09/2020 TAKE ONE TABLET BY MOUTH ONE TIME A DAY   amitriptyline 25 mg oral tablet 06/24/2020 TAKE 1 TABLET BY MOUTH ONCE DAILY AT BEDTIME   aspirin 81 mg oral tablet,delayed release (DR/EC) 11/27/2013 take 1 tablet by oral route daily for 90 days   atorvastatin 40 mg oral tablet 10/05/2020 TAKE 1 TABLET BY MOUTH EVERYDAY AT BEDTIME   buspirone 10 mg oral tablet 06/24/2020 TAKE 1 TABLET BY ORAL ROUTE 3 TIMES A DAY FOR 90 DAYS   Daily Multi-Vitamin oral tablet 11/27/2013 take 1 tablet by oral route daily for 90 days   diclofenac sodium 50 mg oral tablet,delayed release (DR/EC) 08/05/2020 TAKE 1 TABLET BY MOUTH TWICE A DAY AS NEEDED   lisinopril 40 mg oral tablet 09/09/2020 TAKE 1 TABLET BY MOUTH EVERY DAY   meclizine 25 mg oral tablet 11/18/2020 TAKE 1 TABLET BY MOUTH FOUR TIMES A DAY   OMEPRAZOLE DR 20 MG CAPSULE 10/19/2020 TAKE 2 CAPSULES BY MOUTH EVERY DAY BEFORE MEALS   propranolol 40 mg oral tablet 11/18/2020 TAKE 1 TABLET BY MOUTH 3 TIMES PER DAY   spironolactone 25 mg oral tablet 11/13/2020 TAKE 1 TABLET BY MOUTH EVERY OTHER DAY, OR AS DIRECTED   Synthroid 50 mcg oral tablet  take 1 tablet (50 mcg) by oral route once daily   Vascepa  1 gram oral capsule 11/19/2020 take 2 capsules (2 gram) by oral route 2 times per day with food swallowing whole. Do not chew, open, dissolve and/or crush.   Zyrtec 10 mg oral tablet 02/24/2017 take 1 tablet (10 mg) by oral route once daily prn allergies         Allergy List  Allergen Name Date Reaction Notes   NO KNOWN DRUG ALLERGIES --  --  --          Family Medical History  Disease Name Relative/Age Notes   Cancer, Unspecified  --    Heart failure  --    Spine Problems  --          Social History  Finding Status Start/Stop Quantity Notes   Active but no formal exercise --  --/-- --  --    Alcohol Never --/-- --  11/09/2020 - 06/24/2020 - 04/29/2020 - 01/31/2020 -     --  --/-- --  --    No known infection risk --  --/-- --  --    Tobacco Former 16/52 1 pack Quit smoking 20 yrs ago         Immunizations  NameDate Admin Mfg Trade Name Lot Number Route Inj VIS Given VIS Publication   DTaP11/27/2013 [facility] TRIPEDIA [id number] IM LD 11/27/2013    Comments: tolerated well.   Fnnbjwkws21/24/2016 SKB Fluarix, quadrivalent, preservative free [address] IM RD 10/24/2016 08/07/2015   Comments: tolerated well   Mlztizmazdkk24/27/2013 MSD PNEUMOVAX 23 [id number] IM RD 11/27/2013 10/06/2009   Comments: tolerated well.   Xbbdctbh84/01/2009 MSD ZOSTAVAX  SC NE 11/27/2013    Comments:          Review of Systems  · Constitutional  o Denies  o : fever, fatigue, weight loss, weight gain  · Cardiovascular  o Denies  o : lower extremity edema, claudication, chest pressure, palpitations  · Respiratory  o Denies  o : shortness of breath, wheezing, cough, hemoptysis, dyspnea on exertion  · Gastrointestinal  o Denies  o : nausea, vomiting, diarrhea, constipation, abdominal pain  · Genitourinary  o Admits  o : decreased libido      Physical Examination  · Constitutional  o Appearance  o : alert, in no acute distress, well developed, well-nourished  · Skin and Subcutaneous Tissue  o General Inspection  o : no rashes, normal skin color, warm and  dry  o Digits and Nails  o : no clubbing, cyanosis, deformities or edema present, normal appearing nails  · Neurologic  o Mental Status Examination  o : alert and oriented to time, place, and person. Gait and Station: normal gait, able to stand without difficulty  · Psychiatric  o Judgement and Insight  o : judgment and insight intact  o Mood and Affect  o : normal mood and affect          Assessment  · Erectile dysfunction     607.84/N52.9  · Hoarseness     784.42/R49.0  · Renal insufficiency     593.9/N28.9      Plan  · Orders  o BMP [facility] (31909) - - 12/04/2020  o ACO-39: Current medications updated and reviewed (, 1159F) - - 12/04/2020  o ACO-14: Influenza immunization administered or previously received [facility] () - - 12/04/2020  o ENT CONSULTATION (ENTCO) - 784.42/R49.0 - 12/04/2020  o UROLOGY CONSULTATION (UROLO) - 607.84/N52.9 - 12/04/2020   .   · Medications  o Medications have been Reconciled  o Transition of Care or Provider Policy  · Instructions  o Take all medications as prescribed/directed.  o Patient was educated/instructed on their diagnosis, treatment and medications prior to discharge from the clinic today.  o Patient instructed to seek medical attention urgently for new or worsening symptoms.  o Call the office with any concerns or questions.  o follow up in March as scheduled  o Electronically Identified Patient Education Materials Provided Electronically  · Disposition  o Call or Return if symptoms worsen or persist.            Electronically Signed by: YASH Gagnon -Author on December 4, 2020 02:32:15 PM

## 2021-05-13 NOTE — PROGRESS NOTES
Progress Note      Patient Name: Asaf Chilel   Patient ID: [id number]   Sex: Male   YOB: 1945    Primary Care Provider: Concetta BERRIOS   Referring Provider: Concetta BERRIOS    Visit Date: November 9, 2020    Provider: YASH Gagnon   Location: [facility]   Location Address: [address] [place] [address], [address]  [place], [place]  [id number]   Location Phone: [phone]          Chief Complaint  · 4 MONTH F/U      History Of Present Illness  Asaf Chilel is a 75 year old /White male who presents for evaluation and treatment of:      Patient is here today for 4 month follow up.     BP runs under 120-130/60's at home. No cc.    pulmonary-changed from trilogy to Spiriva which is not working as well-the trilogy was stopped due to cost. He is using the albuterol as needed. Wheezing stopped since starting inhaler. He is winter to f/u but unsure of date.    nephrology-he just did a 24 hour urine for -no changes were made-will f/u in 1 yr.    scheduled to see  next wk for his thyroid.     winter w/dermatology Thursday for psoriasis.     reports he had COVID 6-8 wks ago then his family got it -they were quarantined x 6 wks. States is only symptom was sore throat.     flu vaccine 10/20  tdap 2013  shingles 2009  prevar -xwtwvdd he had both done  colonoscopy 2016-reports he was told he will need one next yr.    saw  in Aug-he discontinued his fish oil and placed him on vascepa, encouraged wt loss. He is winter to f/u next month w/echo to f/u CHF.     saw  from vascular-states no stenosis noted b/l carotid arteries-mild calcified plaque bifurcation not limiting or sign for pathology that wound concern for further intervention-recommended cont statin and asa    [facility] trig were elevated and LDL low -he is on vascepa now. CBC, tsh, vit b12, CMP WNL.     iron def anemia-he is taking ferrous sulfate. Last iron level 54.     impaired  fasting glucose-reviewed A1C 5.8%    c/o fatigue-ordered vit D level and testosterone level.     gout denies gout flares-request refill.    reviewed PFT did not show obstruction or restriction-pulm changed from trilogy to Spiriva.     GERD under good control w/PPI.             Past Medical History  Disease Name Date Onset Notes   Anemia, iron deficiency 05/07/2015 --    Anxiety --  --    Gastroesophageal reflux --  --    Gout --  --    High cholesterol --  --    Hip Pain --  --    Hypertension --  --    Limb Pain --  --    Lumbago/low back pain 09/03/2013 MRI shows abnormal appearance of bone marrow. Workup negative per Hem/onc.   Peripheral vascular disease 03/28/2015 --    Sciatica 09/03/2013 --    Tremors --  --          Past Surgical History  Procedure Name Date Notes   Shoulder surgery --  --          Medication List  Name Date Started Instructions   albuterol sulfate 2.5 mg /3 mL (0.083 %) inhalation solution for nebulization 05/13/2020 inhale 3 milliliters (2.5 mg) by nebulization route 3 times per day as needed   albuterol sulfate 90 mcg/actuation inhalation HFA aerosol inhaler 04/09/2020 INHALE 1 TO 2 PUFFS BY MOUTH EVERY 6 HOURS AS NEEDED   allopurinol 300 mg oral tablet 10/29/2019 TAKE ONE TABLET BY MOUTH ONE TIME A DAY   amitriptyline 25 mg oral tablet 06/24/2020 TAKE 1 TABLET BY MOUTH ONCE DAILY AT BEDTIME   aspirin 81 mg oral tablet,delayed release (DR/EC) 11/27/2013 take 1 tablet by oral route daily for 90 days   atorvastatin 40 mg oral tablet 10/05/2020 TAKE 1 TABLET BY MOUTH EVERYDAY AT BEDTIME   Atrovent HFA 17 mcg/actuation inhalation HFA aerosol inhaler 04/29/2020 inhale 2 puffs (34 mcg) by inhalation route 4 times per day   buspirone 10 mg oral tablet 06/24/2020 TAKE 1 TABLET BY ORAL ROUTE 3 TIMES A DAY FOR 90 DAYS   Daily Multi-Vitamin oral tablet 11/27/2013 take 1 tablet by oral route daily for 90 days   diclofenac sodium 50 mg oral tablet,delayed release (DR/EC) 08/05/2020 TAKE 1 TABLET BY  MOUTH TWICE A DAY AS NEEDED   Fish Oil oral  --    Lipitor 40 mg oral tablet 09/24/2019 TAKE 1 TABLET BY MOUTH EVERYDAY AT BEDTIME   lisinopril 40 mg oral tablet 09/09/2020 TAKE 1 TABLET BY MOUTH EVERY DAY   meclizine 25 mg oral tablet 08/12/2020 TAKE 1 TABLET BY MOUTH FOUR TIMES A DAY   omeprazole 20 mg oral capsule,delayed release(DR/EC) 02/05/2020 TAKE 2 CAPSULES (40 MG) BY ORAL ROUTE ONCE DAILY BEFORE A MEAL FOR 90 DAYS   OMEPRAZOLE DR 20 MG CAPSULE 10/19/2020 TAKE 2 CAPSULES BY MOUTH EVERY DAY BEFORE MEALS   ProAir HFA 90 mcg/actuation inhalation HFA aerosol inhaler 01/31/2020 inhale 1 - 2 puffs (90 - 180 mcg) by inhalation route every 6 hours as needed   propranolol 40 mg oral tablet 07/23/2020 TAKE 1 TABLET BY MOUTH 3 TIMES PER DAY   spironolactone 25 mg oral tablet 05/18/2020 TAKE 1 TABLET BY MOUTH EVERY OTHER DAY, OR AS DIRECTED   Synthroid 50 mcg oral tablet  take 1 tablet (50 mcg) by oral route once daily   Trelegy Ellipta 100-62.5-25 mcg inhalation blister with device 06/11/2020 inhale 1 puff by inhalation route once daily at the same time each day   Zyrtec 10 mg oral tablet 02/24/2017 take 1 tablet (10 mg) by oral route once daily prn allergies         Allergy List  Allergen Name Date Reaction Notes   NO KNOWN DRUG ALLERGIES --  --  --        Allergies Reconciled  Family Medical History  Disease Name Relative/Age Notes   Cancer, Unspecified  --    Heart failure  --    Spine Problems  --          Social History  Finding Status Start/Stop Quantity Notes   Active but no formal exercise --  --/-- --  --    Alcohol Never --/-- --  11/09/2020 - 06/24/2020 - 04/29/2020 - 01/31/2020 -     --  --/-- --  --    No known infection risk --  --/-- --  --    Tobacco Former 16/52 1 pack Quit smoking 20 yrs ago         Immunizations  NameDate Admin Mfg Trade Name Lot Number Route Inj VIS Given VIS Publication   DTaP11/27/2013 [facility] TRIPEDIA [id number] IM LD 11/27/2013    Comments: tolerated well.   Glxpjjyux08/24/2016  SKB Fluarix, quadrivalent, preservative free [address] IM RD 10/24/2016 08/07/2015   Comments: tolerated well   Ltbkwxmmtdja72/27/2013 MSD PNEUMOVAX 23 [id number] IM RD 11/27/2013 10/06/2009   Comments: tolerated well.   Zehvyttk95/01/2009 MSD ZOSTAVAX  SC NE 11/27/2013    Comments:          Review of Systems  · Constitutional  o Admits  o : fatigue  o Denies  o : fever, weight loss, weight gain  · Cardiovascular  o Denies  o : lower extremity edema, claudication, chest pressure, palpitations  · Respiratory  o Denies  o : shortness of breath, wheezing, cough, hemoptysis, dyspnea on exertion  · Gastrointestinal  o Denies  o : nausea, vomiting, diarrhea, constipation, abdominal pain      Vitals  Date Time BP Position Site L\R Cuff Size HR RR TEMP (F) WT  HT  BMI kg/m2 BSA m2 O2 Sat FR L/min FiO2 HC       11/09/2020 09:31 /61 Sitting    62 - R   231lbs 6oz 6'   31.38 2.31 98 %            Physical Examination  · Constitutional  o Appearance  o : alert, in no acute distress, well developed, well-nourished  · Neck  o Thyroid  o : no thyomegaly, no palpabale masses   · Respiratory  o Auscultation of Lungs  o : normal breath sounds throughout, no wheeze, rhonchi, or crackles  · Cardiovascular  o Heart  o : Regular rate and rhythm, Normal S1,S2 , No cardiac murmers, No S3 or S4 gallop or rubs  · Skin and Subcutaneous Tissue  o General Inspection  o : no rashes, normal skin color, warm and dry  o Digits and Nails  o : no clubbing, cyanosis, deformities or edema present, normal appearing nails  · Neurologic  o Mental Status Examination  o : alert and oriented to time, place, and person. Gait and Station: normal gait, able to stand without difficulty  · Psychiatric  o Judgement and Insight  o : judgment and insight intact  o Mood and Affect  o : normal mood and affect          Assessment  · CHF (congestive heart failure)     428.0/I50.9  · Fatigue     780.79/R53.83  · GERD (gastroesophageal reflux  disease)     530.81/K21.9  · Hyperlipidemia     272.4/E78.5  · Impaired fasting glucose     790.21/R73.01  · Screening for prostate cancer     V76.44/Z12.5  · Anemia, iron deficiency     280.9/D50.9  · Gastroesophageal reflux     530.81/K21.9  · Gout     274.9/M10.9  · Hypertension     401.9/I10  · Anxiety     300.02/F41.1  · Psoriasis     696.1/L40.9  · Hypertriglyceridemia     272.1/E78.1      Plan  · Orders  o Vitamin D (25-Hydroxy) Level (19264) - 780.79/R53.83 - 11/09/2020  o ACO-39: Current medications updated and reviewed (, 1159F) - - 11/09/2020  o ACO-14: Influenza immunization administered or previously received [facility] () - - 11/09/2020  o Testosterone (Total) (23401) - 780.79/R53.83 - 11/09/2020  o PSA Screening, Ultrasensitive, MEDICARE [facility] () - V76.44/Z12.5 - 11/09/2020  · Medications  o allopurinol 300 mg oral tablet   SIG: TAKE ONE TABLET BY MOUTH ONE TIME A DAY   DISP: (90) Tablet with 1 refills  Refilled on 11/09/2020     o Atrovent HFA 17 mcg/actuation inhalation HFA aerosol inhaler   SIG: inhale 2 puffs (34 mcg) by inhalation route 4 times per day   DISP: (1) 12.9 gm aer w/adap with 0 refills  Discontinued on 11/09/2020     o Medications have been Reconciled  o Transition of Care or Provider Policy  · Instructions  o Advised that cheeses and other sources of dairy fats, animal fats, fast food, and the extras (candy, pastries, pies, doughnuts and cookies) all contain LDL raising nutrients. Advised to increase fruits, vegetables, whole grains, and to monitor portion sizes.   o Take all medications as prescribed/directed.  o Patient was educated/instructed on their diagnosis, treatment and medications prior to discharge from the clinic today.  o Patient instructed to seek medical attention urgently for new or worsening symptoms.  o Call the office with any concerns or questions.  o 4 month follow up  o Electronically Identified Patient Education Materials Provided  Electronically  · Disposition  o Call or Return if symptoms worsen or persist.            Electronically Signed by: YASH Gagnon -Author on November 9, 2020 10:11:31 AM

## 2021-05-13 NOTE — PROGRESS NOTES
Progress Note      Patient Name: Asaf Chilel   Patient ID: [id number]   Sex: Male   YOB: 1945    Primary Care Provider: Concetta BERRIOS   Referring Provider: Concetta BERRIOS    Visit Date: August 10, 2020    Provider: Arnaldo Duvall MD   Location: [facility]   Location Address: [address] [place] [address], [facility]   NIKKI Levi  [id number]   Location Phone: [phone]          Chief Complaint     Followup of hypertension, congestive heart failure, and hyperlipidemia.       History Of Present Illness  REFERRING PROVIDER: Concetta BERRIOS   Asaf Chilel is a 75 year old /White male with hypertension, hyperlipidemia, and congestive heart failure with recovery of LV function who is doing well. He denies any chest pain, shortness of breath, paroxysmal nocturnal dyspnea, orthopnea, palpitations, dizziness, or syncope. His home blood pressures are much better than the office. They generally stay 120 to 130 systolic.   PAST MEDICAL HISTORY: Congestive heart failure with recovery of ejection fraction; Hyperlipidemia; Hypertension.   FAMILY HISTORY: Positive for heart disease. Negative for diabetes mellitus or heart disease.   PSYCHOSOCIAL HISTORY: Denies alcohol or tobacco use. Denies mood changes or depression.   CURRENT MEDICATIONS: Allopurinol 300 mg daily; amitriptyline 25 mg daily; aspirin 81 mg daily; atorvastatin 40 mg daily; buspirone 10 mg t.i.d.; ferrous sulfate b.i.d.; fish oil 360 mg q.i.d.; lisinopril 40 mg daily; multivitamin daily; omeprazole 20 mg b.i.d.; propranolol 40 mg t.i.d.; meclizine 25 mg q.i.d.; spironolactone 25 mg every other day; Synthroid 88 mcg daily.       Review of Systems  · Cardiovascular  o Denies  o : palpitations (fast, fluttering, or skipping beats), swelling (feet, ankles, hands), shortness of breath while walking or lying flat, chest pain or angina pectoris   · Respiratory  o Admits  o : asthma or wheezing  o Denies  o : chronic or frequent  cough      Vitals  Date Time BP Position Site L\R Cuff Size HR RR TEMP (F) WT  HT  BMI kg/m2 BSA m2 O2 Sat HC       08/10/2020 09:36 /60 Sitting    56 - R   230lbs 0oz 6'   31.19 2.3     08/10/2020 09:36 /60 Sitting    58 - R                 Physical Examination  · Constitutional  o Appearance  o : Awake, alert, in no acute distress.  · Eyes  o Conjunctivae  o : Conjunctivae normal.  · Ears, Nose, Mouth and Throat  o Oral Cavity  o :   § Oral Mucosa  § : Normal.  · Neck  o Inspection/Palpation/Auscultation  o : No lymphadenopathy. No JVD. No bruit. Good carotid upstroke.  · Respiratory  o Respiratory  o : Clear to percussion and auscultation. Good respiratory effort.  · Cardiovascular  o Heart  o : PMI not well felt. S1, S2 normal. No S3. No S4.  o Peripheral Vascular System  o :   § Extremities  § : Good femoral and pedal pulses. No pedal edema.  · Gastrointestinal  o Abdominal Examination  o : Soft. No masses or tenderness felt. No hepatosplenomegaly. Abdominal aorta is not palpable.  · EKG  o EKG  o : Performed in the office today.  o Indications  o : Congestive heart failure.  o Results  o : Sinus rhythm. Prolonged DC interval. Left bundle branch block.  o Comparison  o : No significant change compared to previous EKG.   · Labs  o Labs  o : Chemistry panel is normal. HDL 37, LDL 64, triglycerides 295.          Assessment     1.  Congestive heart failure with recovery of LV function, class II.  2.  Hypertension, controlled based on home blood pressure readings.  3.  Hyperlipidemia with low HDL and high triglycerides.       Plan     1.  Discontinue over-the-counter fish oil.  2.  Start Vascepa 1 gram b.i.d. for 10 days followed by 2 grams b.i.d.  3.  Encouraged him to continue to work on further weight loss.  4.  Follow up in 4 months.  At which time, we will do an echocardiogram to follow up on his CHF.      Arnaldo Duvall MD, FACC  PM:vm             Electronically Signed [facility]: [facility]  , Other -Author on August 12, 2020 08:23:35 AM  Electronically Co-signed by: Arnaldo Duvall MD -Reviewer on August 14, 2020 04:46:49 PM  Electronically Co-signed by: Nancy Mackenzie MA -Reviewer on October 5, 2020 11:12:30 AM

## 2021-05-14 VITALS — BODY MASS INDEX: 31.49 KG/M2 | TEMPERATURE: 97.8 F | HEIGHT: 72 IN | WEIGHT: 232.5 LBS

## 2021-05-14 VITALS
SYSTOLIC BLOOD PRESSURE: 108 MMHG | HEIGHT: 72 IN | BODY MASS INDEX: 31.02 KG/M2 | HEART RATE: 74 BPM | DIASTOLIC BLOOD PRESSURE: 64 MMHG | WEIGHT: 229 LBS

## 2021-05-14 VITALS
HEART RATE: 62 BPM | HEIGHT: 72 IN | WEIGHT: 231.37 LBS | OXYGEN SATURATION: 98 % | SYSTOLIC BLOOD PRESSURE: 143 MMHG | DIASTOLIC BLOOD PRESSURE: 61 MMHG | BODY MASS INDEX: 31.34 KG/M2

## 2021-05-14 VITALS
OXYGEN SATURATION: 95 % | SYSTOLIC BLOOD PRESSURE: 138 MMHG | DIASTOLIC BLOOD PRESSURE: 67 MMHG | WEIGHT: 232 LBS | HEART RATE: 78 BPM | HEIGHT: 72 IN | BODY MASS INDEX: 31.42 KG/M2

## 2021-05-14 VITALS
WEIGHT: 229.5 LBS | BODY MASS INDEX: 31.08 KG/M2 | DIASTOLIC BLOOD PRESSURE: 65 MMHG | OXYGEN SATURATION: 98 % | SYSTOLIC BLOOD PRESSURE: 126 MMHG | HEART RATE: 61 BPM | HEIGHT: 72 IN

## 2021-05-14 VITALS
TEMPERATURE: 97.6 F | SYSTOLIC BLOOD PRESSURE: 156 MMHG | WEIGHT: 230 LBS | HEIGHT: 72 IN | HEART RATE: 63 BPM | DIASTOLIC BLOOD PRESSURE: 75 MMHG | BODY MASS INDEX: 31.15 KG/M2

## 2021-05-14 NOTE — PROGRESS NOTES
Progress Note      Patient Name: Asaf Chilel   Patient ID: [id number]   Sex: Male   YOB: 1945    Primary Care Provider: Concetta BERRIOS   Referring Provider: Concetta BERRIOS    Visit Date: March 9, 2021    Provider: YASH Gagnon   Location: [facility]   Location Address: [address] [place] [address], [address]  [place], [place]  [id number]   Location Phone: [phone]          Chief Complaint  · 4 MONTHS F/U      History Of Present Illness  Asaf Chilel is a 75 year old /White male who presents for evaluation and treatment of:      Patient is here today for 4 months follow up.    Says he got his first Covid-19 shot 2 weeks ago with no side effects. Scheduled for next vaccine 4/1/21.    htn-States he check his BP once in while and it runs normal. b/p in the office today 126/65. States he saw  2 wks ago -he had an echo and was told he will need an aortic valve replacement in the next 2-10 yrs. He recommended wt loss. He has lost 3lbs since his visit w/ by watching his carb intake. States he cut sugar out of his coffee. He also aims to start exercising.  Refilled lisinopril.    impaired fasting glucose-last A1C was 5.8.     Says his L foot goes numb while siting down started 2 days ago. Denies pain. States it does not bother him when he walks. Admits the numbness at night when his socks are off. On exam his feet  are slightly cool to the touch but he has good pedal pulses b/l. He used to see  due to hx of cardiac bypass-he last saw vascular in June and was told everything looked normal. Ordered labs to assess numbness-he does have a hx of impaired fasting glucose-ordered A1C to assess for diabetes.     gout-denies any recent flares. Ordered uric acid level.    iron def-states he takes an otc iron supplemement-ordered iron profile and ferritin.       Past Medical History  Disease Name Date Onset Notes   Anemia, iron deficiency 05/07/2015 --     Anxiety --  --    Gastroesophageal reflux --  --    Gout --  --    High cholesterol --  --    Hip Pain --  --    Hoarseness, chronic --  --    Hypertension --  --    Limb Pain --  --    Lumbago/low back pain 09/03/2013 MRI shows abnormal appearance of bone marrow. Workup negative per Hem/onc.   Peripheral vascular disease 03/28/2015 --    Sciatica 09/03/2013 --    Tremors --  --          Past Surgical History  Procedure Name Date Notes   Colonoscopy 2014 normal Dr. Bingham   Shoulder surgery --  --          Medication List  Name Date Started Instructions   allopurinol 300 mg oral tablet 03/02/2021 TAKE 1 TABLET BY MOUTH EVERY DAY   amitriptyline 25 mg oral tablet 06/24/2020 TAKE 1 TABLET BY MOUTH ONCE DAILY AT BEDTIME   AMITRIPTYLINE HCL 25 MG TAB 03/09/2021 TAKE 1 TABLET BY MOUTH EVERYDAY AT BEDTIME   aspirin 81 mg oral tablet,delayed release (/EC) 11/27/2013 take 1 tablet by oral route daily for 90 days   atorvastatin 40 mg oral tablet 10/05/2020 TAKE 1 TABLET BY MOUTH EVERYDAY AT BEDTIME   buspirone 10 mg oral tablet 06/24/2020 TAKE 1 TABLET BY ORAL ROUTE 3 TIMES A DAY FOR 90 DAYS   clotrimazole-betamethasone 1-0.05 % topical cream 12/15/2020 apply to the affected and surrounding areas of skin by topical route 2 times per day in the morning and evening to affected area as needed   Daily Multi-Vitamin oral tablet 11/27/2013 take 1 tablet by oral route daily for 90 days   ferrous sulfate 325 mg (65 mg iron) oral tablet  take 1 tablet (325 mg) by oral route once daily   lisinopril 40 mg oral tablet 09/09/2020 TAKE 1 TABLET BY MOUTH EVERY DAY   meclizine 25 mg oral tablet 12/04/2020 TAKE 1 TABLET BY MOUTH FOUR TIMES A DAY   Neurontin 300 mg oral capsule 01/16/2021 take 1 capsule (300 mg) by oral route q hs   propranolol 40 mg oral tablet 11/18/2020 TAKE 1 TABLET BY MOUTH 3 TIMES PER DAY   spironolactone 25 mg oral tablet 11/13/2020 TAKE 1 TABLET BY MOUTH EVERY OTHER DAY, OR AS DIRECTED   Synthroid 88 mcg oral  tablet  take 1 tablet (88 mcg) by oral route once daily   Vascepa 1 gram oral capsule 11/19/2020 take 2 capsules (2 gram) by oral route 2 times per day with food swallowing whole. Do not chew, open, dissolve and/or crush.         Allergy List  Allergen Name Date Reaction Notes   NO KNOWN DRUG ALLERGIES --  --  --          Family Medical History  Disease Name Relative/Age Notes   Spine Problems  --    Family history of cancer  --    Family history of heart failure  --          Social History  Finding Status Start/Stop Quantity Notes   Active but no formal exercise --  --/-- --  --    Alcohol Never --/-- --  03/09/2021 - 01/14/2021 - 11/09/2020 - 06/24/2020 - 04/29/2020 - 01/31/2020 -     --  --/-- --  --    No known infection risk --  --/-- --  --    Tobacco Former 16/52 1 pack 12/15/2020 - Quit smoking 20 yrs ago 2000         Immunizations  NameDate Admin Mfg Trade Name Lot Number Route Inj VIS Given VIS Publication   DTaP11/27/2013 [facility] TRIPEDIA [id number] IM LD 11/27/2013    Comments: tolerated well.   Oaipcjuyf20/24/2016 SKB Fluarix, quadrivalent, preservative free 359MH IM RD 10/24/2016 08/07/2015   Comments: tolerated well   Kfhabhrqagoe79/27/2013 MSD PNEUMOVAX 23 [id number] IM RD 11/27/2013 10/06/2009   Comments: tolerated well.   Nmpermsb31/01/2009 MSD ZOSTAVAX  SC NE 11/27/2013    Comments:          Review of Systems  · Constitutional  o Denies  o : fever, fatigue, weight loss, weight gain  · Cardiovascular  o Denies  o : lower extremity edema, claudication, chest pressure, palpitations  · Respiratory  o Denies  o : shortness of breath, wheezing, cough, hemoptysis, dyspnea on exertion  · Gastrointestinal  o Denies  o : nausea, vomiting, diarrhea, constipation, abdominal pain  · Neurologic  o Admits  o : tingling or numbness      Vitals  Date Time BP Position Site L\R Cuff Size HR RR TEMP (F) WT  HT  BMI kg/m2 BSA m2 O2 Sat FR L/min FiO2 HC       03/09/2021 09:10 /65 Sitting    61 - R   229lbs 8oz 6'    31.13 2.3 98 %            Physical Examination  · Constitutional  o Appearance  o : alert, in no acute distress, well developed, well-nourished  · Neck  o Thyroid  o : no thyomegaly, no palpabale masses   · Respiratory  o Auscultation of Lungs  o : normal breath sounds throughout, no wheeze, rhonchi, or crackles  · Cardiovascular  o Heart  o : Regular rate and rhythm, Normal S1,S2 , No cardiac murmers, No S3 or S4 gallop or rubs  · Skin and Subcutaneous Tissue  o General Inspection  o : no rashes, normal skin color, warm and dry  o Digits and Nails  o : no clubbing, cyanosis, deformities or edema present, normal appearing nails  · Neurologic  o Mental Status Examination  o : alert and oriented to time, place, and person. Gait and Station: normal gait, able to stand without difficulty  · Psychiatric  o Judgement and Insight  o : judgment and insight intact  o Mood and Affect  o : normal mood and affect     dorsalis pedis and posterior tibial pulses present and strong           Assessment  · Hyperlipidemia     272.4/E78.5  · Hypothyroidism     244.9/E03.9  · Impaired fasting glucose     790.21/R73.01  · Anemia, iron deficiency     280.9/D50.9  · Peripheral vascular disease     443.9/I73.9  · Sciatica     724.3/M54.30  · Gastroesophageal reflux     530.81/K21.9  · Gout     274.9/M10.9  · Hypertension     401.9/I10  · Anxiety     300.02/F41.1  · Numbness and tingling       Anesthesia of skin     782.0/R20.0  Paresthesia of skin     782.0/R20.2  · History of heart bypass surgery     V45.81/Z95.1      Plan  · Orders  o Hgb A1c [facility] (22628) - 790.21/R73.01 - 03/09/2021  o Physical, Primary Care Panel (CBC, CMP, Lipid, TSH) [facility] (92522, 07114, 24029, 81517) - 401.9/I10, 280.9/D50.9, 272.4/E78.5, 244.9/E03.9 - 03/09/2021  o Vitamin D (25-Hydroxy) Level (82043) - 782.0/R20.0, 782.0/R20.2 - 03/09/2021  o ACO-14: Influenza immunization administered or previously received [facility] () - - 03/09/2021  o ACO-19: Colorectal cancer  screening results documented and reviewed (3017F) - - 01/28/2014  o ACO-39: Current medications updated and reviewed (1159F, ) - - 03/09/2021  o Vitamin B12 level (24032) - 782.0/R20.0, 782.0/R20.2 - 03/09/2021  o Iron Profile (Iron 14733 TIBC 90256 and Transferrin 83238) (IRONP) - 280.9/D50.9 - 03/09/2021  o Ferritin (56149) - 280.9/D50.9 - 03/09/2021  o Uric Acid Serum [facility] (46533) - 274.9/M10.9 - 03/09/2021  · Medications  o omeprazole 20 mg oral capsule,delayed release(DR/EC)   SIG: take 1 capsule (20 mg) by oral route bid   DISP: (180) Capsule with 1 refills  Prescribed on 03/09/2021     o lisinopril 40 mg oral tablet   SIG: TAKE 1 TABLET BY MOUTH EVERY DAY   DISP: (90) Tablet with 1 refills  Adjusted on 03/09/2021     o amitriptyline 25 mg oral tablet   SIG: TAKE 1 TABLET BY MOUTH ONCE DAILY AT BEDTIME   DISP: (90) Tablet with 1 refills  Refilled on 03/09/2021     o Neurontin 300 mg oral capsule   SIG: take 1 capsule (300 mg) by oral route q hs   DISP: (30) Capsule with 0 refills  Discontinued on 03/09/2021     o Medications have been Reconciled  o Transition of Care or Provider Policy  · Instructions  o Advised that cheeses and other sources of dairy fats, animal fats, fast food, and the extras (candy, pastries, pies, doughnuts and cookies) all contain LDL raising nutrients. Advised to increase fruits, vegetables, whole grains, and to monitor portion sizes.   o Instructed patient to watch their diet and exercise.  o Take all medications as prescribed/directed.  o Patient was educated/instructed on their diagnosis, treatment and medications prior to discharge from the clinic today.  o Patient instructed to seek medical attention urgently for new or worsening symptoms.  o Call the office with any concerns or questions.  o 4 month follow up  o Electronically Identified Patient Education Materials Provided Electronically  · Disposition  o Call or Return if symptoms worsen or persist.            Electronically  Signed by: Concetta Carson APRN -Author on March 9, 2021 09:58:27 AM

## 2021-05-14 NOTE — PROGRESS NOTES
Progress Note      Patient Name: Asaf Chilel   Patient ID: [id number]   Sex: Male   YOB: 1945    Primary Care Provider: Concetta BERRIOS   Referring Provider: Concetta BERRIOS    Visit Date: January 14, 2021    Provider: YASH Gagnon   Location: [facility]   Location Address: [address] [place] [address], [address]  [place], [place]  [id number]   Location Phone: [phone]          Chief Complaint  · R SIDED FACE PAIN      History Of Present Illness  Asaf Chilel is a 75 year old /White male who presents for evaluation and treatment of:      Patient is here today w cc of R sided sharp face pain.    Says it only hurts at night when he is laying down it started about 2 weeks ago. Reports tenderness says he cant touch R side of his face. Also says R eye tears a lot day and night. Denies sinus pressure or drainage.   Says he tried taking Ibuprofen but it didn't do anything. Denies rash, blisters, change in vision or hearing. Denies injury. Ordered a sinus x-ray and ophthalmology consult. Discussed possible etiologies of blocked tear duct, trigeminal neuralgia affecting the ophthalmic branch or sinusitis. Discussed most likely etiology is blocked tear duct. He is tender around the right eye-states when he lays down at night it is very painful. Discussed Neurontin script for the nerve pain-he is to stop by the office for UDS since he was unable to go today. Santi and Controlled Consent obtained today.       Past Medical History  Disease Name Date Onset Notes   Anemia, iron deficiency 05/07/2015 --    Anxiety --  --    Gastroesophageal reflux --  --    Gout --  --    High cholesterol --  --    Hip Pain --  --    Hoarseness, chronic --  --    Hypertension --  --    Limb Pain --  --    Lumbago/low back pain 09/03/2013 MRI shows abnormal appearance of bone marrow. Workup negative per Hem/onc.   Peripheral vascular disease 03/28/2015 --    Sciatica 09/03/2013 --    Tremors --   --          Past Surgical History  Procedure Name Date Notes   Colonoscopy 2014 normal Dr. Bingham   Shoulder surgery --  --          Medication List  Name Date Started Instructions   allopurinol 300 mg oral tablet 11/09/2020 TAKE ONE TABLET BY MOUTH ONE TIME A DAY   amitriptyline 25 mg oral tablet 06/24/2020 TAKE 1 TABLET BY MOUTH ONCE DAILY AT BEDTIME   aspirin 81 mg oral tablet,delayed release (/EC) 11/27/2013 take 1 tablet by oral route daily for 90 days   atorvastatin 40 mg oral tablet 10/05/2020 TAKE 1 TABLET BY MOUTH EVERYDAY AT BEDTIME   buspirone 10 mg oral tablet 06/24/2020 TAKE 1 TABLET BY ORAL ROUTE 3 TIMES A DAY FOR 90 DAYS   clotrimazole-betamethasone 1-0.05 % topical cream 12/15/2020 apply to the affected and surrounding areas of skin by topical route 2 times per day in the morning and evening to affected area as needed   Daily Multi-Vitamin oral tablet 11/27/2013 take 1 tablet by oral route daily for 90 days   ferrous sulfate 325 mg (65 mg iron) oral tablet  take 1 tablet (325 mg) by oral route once daily   lisinopril 40 mg oral tablet 09/09/2020 TAKE 1 TABLET BY MOUTH EVERY DAY   meclizine 25 mg oral tablet 12/04/2020 TAKE 1 TABLET BY MOUTH FOUR TIMES A DAY   OMEPRAZOLE DR 20 MG CAPSULE 10/19/2020 TAKE 2 CAPSULES BY MOUTH EVERY DAY BEFORE MEALS   propranolol 40 mg oral tablet 11/18/2020 TAKE 1 TABLET BY MOUTH 3 TIMES PER DAY   spironolactone 25 mg oral tablet 11/13/2020 TAKE 1 TABLET BY MOUTH EVERY OTHER DAY, OR AS DIRECTED   Synthroid 88 mcg oral tablet  take 1 tablet (88 mcg) by oral route once daily   Vascepa 1 gram oral capsule 11/19/2020 take 2 capsules (2 gram) by oral route 2 times per day with food swallowing whole. Do not chew, open, dissolve and/or crush.   Viagra 100 mg oral tablet 12/15/2020 take 1/2 to 1 tablet (100 mg) by oral route once daily as needed approximately 1 hour before sexual activity take on empty stomach         Allergy List  Allergen Name Date Reaction Notes   NO KNOWN  DRUG ALLERGIES --  --  --        Allergies Reconciled  Family Medical History  Disease Name Relative/Age Notes   Spine Problems  --    Family history of cancer  --    Family history of heart failure  --          Social History  Finding Status Start/Stop Quantity Notes   Active but no formal exercise --  --/-- --  --    Alcohol Never --/-- --  01/14/2021 - 11/09/2020 - 06/24/2020 - 04/29/2020 - 01/31/2020 -     --  --/-- --  --    No known infection risk --  --/-- --  --    Tobacco Former 16/52 1 pack 12/15/2020 - Quit smoking 20 yrs ago 2000         Immunizations  NameDate Admin Mfg Trade Name Lot Number Route Inj VIS Given VIS Publication   DTaP11/27/2013 [facility] TRIPEDIA [id number] IM LD 11/27/2013    Comments: tolerated well.   Kvzcoqijg50/24/2016 SKB Fluarix, quadrivalent, preservative free 359MH IM RD 10/24/2016 08/07/2015   Comments: tolerated well   Lqvrliphqwfy70/27/2013 MSD PNEUMOVAX 23 [id number] IM RD 11/27/2013 10/06/2009   Comments: tolerated well.   Hshmtxjo31/01/2009 MSD ZOSTAVAX  SC NE 11/27/2013    Comments:          Review of Systems  · Constitutional  o Denies  o : fever, fatigue, weight loss, weight gain  · Eyes  o Admits  o : discharge from eye, eye discomfort, eye pain  o Denies  o : double vision, impaired vision, blurred vision, changes in vision  · HENT  o Admits  o : headaches, sinus pain, postnasal drip  o Denies  o : vertigo, recent head injury, nasal congestion, nasal discharge  · Cardiovascular  o Denies  o : lower extremity edema, claudication, chest pressure, palpitations  · Respiratory  o Denies  o : shortness of breath, wheezing, cough, hemoptysis, dyspnea on exertion  · Gastrointestinal  o Denies  o : nausea, vomiting, diarrhea, constipation, abdominal pain      Vitals  Date Time BP Position Site L\R Cuff Size HR RR TEMP (F) WT  HT  BMI kg/m2 BSA m2 O2 Sat FR L/min FiO2 HC       01/14/2021 03:26 /67 Sitting    78 - R   231lbs 16oz 6'   31.46 2.31 95 %            Physical  Examination  · Constitutional  o Appearance  o : alert, in no acute distress, well developed, well-nourished  · Eyes  o Vision  o : Conjuntivae: Normal, Sclerae white, Pupils: PERRL, Cornea: Clear, no lesions bilateral, tenderness to palpation around right eye, tearing noted  · Ears, Nose, Mouth and Throat  o Ears  o : Ext. Ears: Normal shape, Non tender, EACs: Normal , TMs: Normal, Hearing: intact to conversational voice bilaterally  o Nose  o : No nasal discharge, Mucosa: normal, Septum: midline, Sinuses: Nontender  o Throat  o : Oropharynx: no inflmation or lesions, Tonsils: within normal limits  · Respiratory  o Auscultation of Lungs  o : normal breath sounds throughout, no wheeze, rhonchi, or crackles  · Cardiovascular  o Heart  o : Regular rate and rhythm, Normal S1,S2 , No cardiac murmers, No S3 or S4 gallop or rubs  · Skin and Subcutaneous Tissue  o General Inspection  o : no rashes, normal skin color, warm and dry  o Digits and Nails  o : no clubbing, cyanosis, deformities or edema present, normal appearing nails  · Neurologic  o Mental Status Examination  o : alert and oriented to time, place, and person. Gait and Station: normal gait, able to stand without difficulty  · Psychiatric  o Judgement and Insight  o : judgment and insight intact  o Mood and Affect  o : normal mood and affect          Results  · In-Office Procedures  o Lab procedure  § IOP - Urine Drug Screen In-House [facility] (63295)   § Amphetamines Ur Ql: Negative   § Barbiturates Ur Ql: Negative   § Buprenorphine+Nor Ur Ql Scn: Negative   § Benzodiaz Ur Ql: Negative   § Cocaine Ur Ql: Negative   § Methadone Ur Ql: Negative   § Methamphet Ur Ql: Negative   § MDMA Ur Ql Scn: Negative   § Opiates Ur Ql: Negative   § Oxycodone Ur Ql: Negative   § PCP Ur Ql: Negative   § THC Ur Ql: Negative   § Temp in Range?: Within/Acceptable   § Control Seen?: Yes       Assessment  · Other long term (current) drug therapy     V58.53/Z79.899  · Face  pain     784.0/R51.9  · Eye tearing, right     375.20/H04.201  · Eye pain     379.91/H57.10      Plan  · Orders  o MICHAEL Report (KASPR) - V58.69/Z79.899 - 01/14/2021  o ACO-14: Influenza immunization administered or previously received [facility] () - - 01/14/2021  o ACO-39: Current medications updated and reviewed (, 1159F) - - 01/14/2021  o OPHTHALMOLOGY CONSULTATION (OPHTH) - 784.0/R51.9, 375.20/H04.201 - 01/14/2021  o Sinus xray 3 or more views [facility] Preferred View (59787) - 784.0/R51.9, 375.20/H04.201 - 01/14/2021  · Medications  o Neurontin 300 mg oral capsule   SIG: take 1 capsule (300 mg) by oral route q hs   DISP: (30) Capsule with 0 refills  Prescribed on 01/16/2021     o Medications have been Reconciled  o Transition of Care or Provider Policy  · Instructions  o Take all medications as prescribed/directed.  o Patient was educated/instructed on their diagnosis, treatment and medications prior to discharge from the clinic today.  o Patient instructed to seek medical attention urgently for new or worsening symptoms.  o Call the office with any concerns or questions.  o Electronically Identified Patient Education Materials Provided Electronically  · Disposition  o Call or Return if symptoms worsen or persist.            Electronically Signed by: YASH Gagnon -Author on January 17, 2021 11:52:05 PM

## 2021-05-14 NOTE — PROGRESS NOTES
Progress Note      Patient Name: Asaf Chilel   Patient ID: [id number]   Sex: Male   YOB: 1945    Primary Care Provider: Concetta BERRIOS   Referring Provider: Concetta BERRIOS    Visit Date: January 14, 2021    Provider: Arnaldo Duvall MD   Location: [facility]   Location Address: [address] [place] [address], [facility]   NIKKI Levi  [id number]   Location Phone: [phone]          Chief Complaint  · Followup of congestive heart failure and aortic valve disease      History Of Present Illness  REFERRING PROVIDER: Concetta BERRIOS   Asaf Chilel is a 75-year-old gentleman with congestive heart failure with recovered ejection fraction, hypertension, and hyperlipidemia who is doing very well. He denies any chest pain, shortness of breath, PND, orthopnea, palpitations, dizziness, or syncope. His home blood pressures are good.   PAST MEDICAL HISTORY: Congestive heart failure with recovery of ejection fraction; Hyperlipidemia; Hypertension.   PSYCHOSOCIAL HISTORY: Denies alcohol use. Previously smoked but quit.   CURRENT MEDICATIONS: Allopurinol 300 mg daily; amitriptyline 25 mg daily; aspirin 81 mg daily; atorvastatin 40 mg daily; buspirone 10 mg t.i.d.; ferrous sulfate 325 mg b.i.d.; lisinopril 40 mg daily; multivitamin daily; omeprazole 20 mg b.i.d.; propranolol 40 mg t.i.d.; meclizine 25 mg q.i.d.; spironolactone 25 mg every other day; Synthroid 88 mcg daily; Vascepa 2 gram b.i.d.      ALLERGIES:  No known drug allergies.       Review of Systems  · Cardiovascular  o Denies  o : palpitations (fast, fluttering, or skipping beats), swelling (feet, ankles, hands), shortness of breath while walking or lying flat, chest pain or angina pectoris   · Respiratory  o Denies  o : chronic or frequent cough      Vitals  Date Time BP Position Site L\R Cuff Size HR RR TEMP (F) WT  HT  BMI kg/m2 BSA m2 O2 Sat FR L/min FiO2        01/14/2021 09:56 /64 Sitting    74 - R   229lbs 0oz 6'   31.06 2.3              Physical Examination  · Constitutional  o Appearance  o : Awake, alert, in no acute distress.  · Eyes  o Conjunctivae  o : Conjunctivae normal.  · Ears, Nose, Mouth and Throat  o Oral Cavity  o :   § Oral Mucosa  § : Normal.  · Neck  o Inspection/Palpation/Auscultation  o : No lymphadenopathy. No JVD. No bruit. Good carotid upstroke.  · Respiratory  o Respiratory  o : Clear to percussion and auscultation. Good respiratory effort.  · Cardiovascular  o Heart  o : PMI is not well felt. S1, S2 normal. No S3. No S4.  o Peripheral Vascular System  o :   § Extremities  § : Good femoral and pedal pulses. No pedal edema.  · Gastrointestinal  o Abdominal Examination  o : Soft. No masses or tenderness felt. No hepatosplenomegaly. Abdominal aorta is not palpable.  · Labs  o Labs  o : Blood work from last month revealed BUN 20, creatinine 1.24, GFR 47, HDL 38, LDL 66, triglyceride 227 which is an improvement from before. Repeat BMP last week, BUN 16, creatinine 1.24, GFR 57, normal electrolytes.      Echocardiogram shows fibrocalcific changes of the aortic valve without significant stenosis and ejection fraction of 50%.           Assessment     1.  Congestive heart failure, chronic combined with borderline ejection fraction, recovery of EF over time,        class II symptoms.   2.  Hypertension, controlled.   3.  Hyperlipidemia, with elevated triglycerides, some improvement with Vascepa.       Plan     1.  Continue current medications.   2.  Followup in 6 to 8 months.   3.  Discussed with him an exercise program.      Arnaldo Duvall MD, [facility]  PM/pap    This note was transcribed by Meseret Bales.  pap/pm  The above service was transcribed by Meseret Bales, and I attest to the accuracy of the note.  PM             Electronically Signed by: Peri Bales-, Other -Author on January 19, 2021 08:35:12 AM  Electronically Co-signed by: Arnaldo Duvall MD -Reviewer on January 22, 2021 09:44:27 PM

## 2021-05-15 VITALS
SYSTOLIC BLOOD PRESSURE: 131 MMHG | HEART RATE: 72 BPM | BODY MASS INDEX: 31.19 KG/M2 | WEIGHT: 230.25 LBS | HEIGHT: 72 IN | DIASTOLIC BLOOD PRESSURE: 71 MMHG | OXYGEN SATURATION: 94 %

## 2021-05-15 VITALS
SYSTOLIC BLOOD PRESSURE: 128 MMHG | HEART RATE: 60 BPM | DIASTOLIC BLOOD PRESSURE: 62 MMHG | HEIGHT: 72 IN | WEIGHT: 236 LBS | BODY MASS INDEX: 31.97 KG/M2

## 2021-05-15 VITALS
HEIGHT: 72 IN | WEIGHT: 236.5 LBS | DIASTOLIC BLOOD PRESSURE: 70 MMHG | HEART RATE: 66 BPM | TEMPERATURE: 96.8 F | BODY MASS INDEX: 32.03 KG/M2 | SYSTOLIC BLOOD PRESSURE: 123 MMHG | OXYGEN SATURATION: 96 %

## 2021-05-15 VITALS
DIASTOLIC BLOOD PRESSURE: 56 MMHG | SYSTOLIC BLOOD PRESSURE: 110 MMHG | WEIGHT: 238 LBS | OXYGEN SATURATION: 97 % | HEART RATE: 67 BPM | HEIGHT: 72 IN | BODY MASS INDEX: 32.23 KG/M2

## 2021-05-15 VITALS
SYSTOLIC BLOOD PRESSURE: 117 MMHG | HEART RATE: 92 BPM | OXYGEN SATURATION: 95 % | DIASTOLIC BLOOD PRESSURE: 58 MMHG | WEIGHT: 225 LBS

## 2021-05-15 VITALS — TEMPERATURE: 98 F

## 2021-05-15 VITALS
BODY MASS INDEX: 31.15 KG/M2 | DIASTOLIC BLOOD PRESSURE: 60 MMHG | SYSTOLIC BLOOD PRESSURE: 142 MMHG | WEIGHT: 230 LBS | HEART RATE: 56 BPM | HEIGHT: 72 IN

## 2021-05-16 VITALS
SYSTOLIC BLOOD PRESSURE: 139 MMHG | DIASTOLIC BLOOD PRESSURE: 74 MMHG | BODY MASS INDEX: 31.03 KG/M2 | TEMPERATURE: 97.1 F | WEIGHT: 229.12 LBS | HEART RATE: 63 BPM | HEIGHT: 72 IN

## 2021-05-16 VITALS
OXYGEN SATURATION: 95 % | HEIGHT: 72 IN | HEART RATE: 76 BPM | TEMPERATURE: 98.2 F | WEIGHT: 226 LBS | SYSTOLIC BLOOD PRESSURE: 132 MMHG | DIASTOLIC BLOOD PRESSURE: 66 MMHG | BODY MASS INDEX: 30.61 KG/M2 | RESPIRATION RATE: 16 BRPM

## 2021-05-16 VITALS
HEIGHT: 72 IN | DIASTOLIC BLOOD PRESSURE: 80 MMHG | HEART RATE: 66 BPM | WEIGHT: 237 LBS | BODY MASS INDEX: 32.1 KG/M2 | SYSTOLIC BLOOD PRESSURE: 138 MMHG

## 2021-05-16 VITALS
BODY MASS INDEX: 31.29 KG/M2 | DIASTOLIC BLOOD PRESSURE: 62 MMHG | SYSTOLIC BLOOD PRESSURE: 121 MMHG | WEIGHT: 231 LBS | HEART RATE: 68 BPM | HEIGHT: 72 IN

## 2021-05-16 VITALS
SYSTOLIC BLOOD PRESSURE: 156 MMHG | HEIGHT: 72 IN | BODY MASS INDEX: 31.44 KG/M2 | DIASTOLIC BLOOD PRESSURE: 72 MMHG | HEART RATE: 68 BPM | WEIGHT: 232.12 LBS

## 2021-05-16 VITALS
HEART RATE: 58 BPM | DIASTOLIC BLOOD PRESSURE: 92 MMHG | HEIGHT: 72 IN | SYSTOLIC BLOOD PRESSURE: 154 MMHG | WEIGHT: 233 LBS | BODY MASS INDEX: 31.56 KG/M2

## 2021-05-16 VITALS
HEIGHT: 72 IN | WEIGHT: 230.12 LBS | HEART RATE: 64 BPM | TEMPERATURE: 96.8 F | BODY MASS INDEX: 31.17 KG/M2 | SYSTOLIC BLOOD PRESSURE: 126 MMHG | DIASTOLIC BLOOD PRESSURE: 65 MMHG

## 2021-05-28 VITALS
WEIGHT: 228 LBS | OXYGEN SATURATION: 96 % | BODY MASS INDEX: 30.88 KG/M2 | DIASTOLIC BLOOD PRESSURE: 68 MMHG | SYSTOLIC BLOOD PRESSURE: 133 MMHG | TEMPERATURE: 97.3 F | HEIGHT: 72 IN | HEART RATE: 64 BPM | RESPIRATION RATE: 16 BRPM

## 2021-05-28 VITALS
BODY MASS INDEX: 32.51 KG/M2 | RESPIRATION RATE: 18 BRPM | DIASTOLIC BLOOD PRESSURE: 75 MMHG | TEMPERATURE: 96.8 F | SYSTOLIC BLOOD PRESSURE: 149 MMHG | WEIGHT: 227.07 LBS | HEIGHT: 70 IN | OXYGEN SATURATION: 97 % | HEART RATE: 67 BPM

## 2021-05-28 NOTE — PROGRESS NOTES
Patient: ZAKI JUNG     Acct: [id number]     Report: #[id number]  UNIT #: [id number]     : 1945    Encounter Date:2021  PRIMARY CARE: KAYLAN CAMP  ***Signed***  --------------------------------------------------------------------------------------------------------------------  NURSE INTAKE      Visit Type      New Patient Visit            Chief Complaint      ELEVATED FERRITIN            Referring Provider/Copies To      Referring Provider:  KAYLAN CAMP      Copies To:   KAYLAN CAMP            History and Present Illness      Past Hx      Past medical history significant for hypertension      Peripheral vascular disease      GERD            HPI - Hematology Interim      75-year-old male sent for evaluation/consultation by pcp  for elevated ferritin      Patient denies any family history of liver disease      Denies any family history of hemochromatosis but reports one brother had to have    blood taken off due to high iron       Another brother had low iron and needed iv iron       Pt denies any recent infections       Denies any URI symptoms            Most Recent Lab Findings            Item Value  Date Time             Hemoglobin 14.70 gm/dl 10/24/16 0955             Hemoglobin 12.50 g/dl L 17 0808             Hemoglobin 14.00 g/dL 3/17/19 1326             Hemoglobin 13.30 g/dL L 19 1044             Hemoglobin 14.1 g/dL 3/9/21 1020             Hematocrit 44.9 % 3/9/21 1020             Hematocrit 41.1 % L 19 1044             Hematocrit 43.5 % 3/17/19 1326             Hematocrit 38.4 % L 17 0808             Hematocrit 43.1 % 10/24/16 0955            Labs from 3/9/2021      Iron was 59 TIBC was 259      saturation was 23%      Transferrin was 174      Ferritin was 422      Hereditary hemochromatosis gene mutation studies showed no mutation was     identified            PAST, FAMILY   Past Medical History      Past Medical History:  Arthritis, High Cholesterol,  Hypertension, Thyroid     Disease      Genetic/Metabolic:  None            Past Surgical History      Melanoma Excision            MYELOMA      BYPASS- 2015            Family History      Family History:  Neurologic Cancer (BRAIN)            THROAT CA- BROTHER            Social History      Marital Status:        Lives independently:  Yes      Number of Children:  3            Tobacco Use      Tobacco status:  Former smoker (quit 2000, 1.5 ppd x 40 years)      Quit status:  Quit date established (2002)            Substance Use      Substance use:  Denies use            REVIEW OF SYSTEMS      General:  Admits: Fatigue;          Denies: Appetite Change, Fever, Night Sweats, Weight Gain, Weight Loss      Eye:  Denies Blurred Vision, Denies Corrective Lenses, Denies Diplopia, Denies     Vision Changes      ENT:  Denies Headache, Denies Hearing Loss; Admits Hoarseness; Denies Sore     Throat      Cardiovascular:  Denies Chest Pain, Denies Palpitations      Respiratory:  Admits: Cough;          Denies: Coughing Blood, Productive Cough, Shortness of Air, Wheezing      Gastrointestinal:  Denies Bloody Stools, Denies Constipation, Denies Diarrhea,     Denies Nausea/Vomiting, Denies Problem Swallowing, Denies Unable to Control     Bowels      Genitourinary:  Denies Blood in Urine, Denies Incontinence, Denies Painful     Urination      Musculoskeletal:  Denies Back Pain, Denies Muscle Pain, Denies Painful Joints      Integumentary:  Denies Itching, Denies Lesions, Denies Rash      Other      PSORISIS      Neurologic:  Denies Dizziness, Denies Numbness\Tingling, Denies Seizures      Psychiatric:  Denies Anxiety, Denies Depression      Other      MEMORY LOSS      Endocrine:  Denies Cold Intolerance, Denies Heat Intolerance      Other      THYROID      Hematologic/Lymphatic:  Denies Bruising, Denies Bleeding, Denies Enlarged Lymph     Nodes            VITAL SIGNS AND SCORES      Vitals      Height 5 ft 10.28 in / 178.5 cm       Weight 227 lbs 1.181 oz / 103 kg      BSA 2.21 m2      BMI 32.3 kg/m2      Temperature 96.8 F / 36 C - Temporal      Pulse 67      Respirations 18      Blood Pressure 149/75 Sitting, Left Arm      Pulse Oximetry 97%, RM AIR            Pain Score      Experiencing any pain?:  No      Pain Scale Used:  Numerical      Pain Intensity:  0            Fatigue Score      Experiencing any fatigue?:  Yes      Fatigue (0-10 scale):  7            EXAM      General Appearance:  Positive for: Alert, Oriented x3, Cooperative      Eye:  Positive for: Anicteric Sclerae      HEENT:  Positive for: Oropharynx clear      Neck:  Positive for: Supple      Respiratory:  Positive for: CTAB      Abdomen/Gastro:  Positive for: Normal Active Bowel Sounds      Cardiovascular:  Positive for: RRR      Psychiatric:  Positive for: AAO X 3      Musculoskeletal:  Positive for: Full ROM Lower Extremety, Full ROM Upper     Extremety      Lower Extremities:  Negative for: Edema            PREVENTION      Hx Influenza Vaccination:  Yes      Date Influenza Vaccine Given:  Sep 1, 2020      Influenza Vaccine Declined:  No      2 or More Falls in Past Year?:  No      Fall Past Year with Injury?:  No      Hx Pneumococcal Vaccination:  Yes      Encouraged to follow-up with:  PCP regarding preventative exams.      Chart initiated by      ESHA HIGHTOWER MA            ALLERGY/MEDS      Allergies      Coded Allergies:             NO KNOWN ALLERGIES (Unverified , 4/13/21)            Medications      Last Reconciled on 10/1/20 09:31 by RAQUEL GARCIA MD      Multivitamin (Multivitamins) Unknown Strength Capsule      PO QDAY, CAP         Reported         10/8/20       Levothyroxine (Synthroid) 0.088 Mg      0.088 MG PO QDAY@07, #30 TAB 0 Refills         Reported         6/29/20       Meclizine Hcl (Meclizine*) 25 Mg Tablet      25 MG PO QID, #120 TAB 0 Refills         Reported         6/19/17       Allopurinol (Allopurinol) 300 Mg Tablet      300 MG PO QDAY, #30  TAB 0 Refills         Reported         6/19/17       Omeprazole (Omeprazole*) 20 Mg Tablet.dr      20 MG PO BID, #30 CAP 0 Refills         Reported         6/19/17       Propranolol (Inderal) 40 Mg Tab      40 MG PO QDAY, TAB         Reported         3/11/15       Lisinopril* (Lisinopril*) 2.5 Mg Tablet      40 MG PO HS, #60 TAB 0 Refills         Reported         3/11/15       Amitriptyline HCl (Amitriptyline HCl) 50 Mg Tablet      25 MG PO HS, TAB         Reported         1/15/14       Atorvastatin (Atorvastatin) 20 Mg Tab      40 MG PO HS, TAB         Reported         1/15/14       Aspirin EC (Aspirin EC) 81 Mg Tabec      81 MG PO QDAY, TAB 0 Refills         Reported         7/19/09       busPIRone HCl (busPIRone HCl) 10 Mg Tablet      10 MG PO TID, TAB         Reported         7/19/09      Medications Reviewed:  Changes made to meds            IMPRESSION/PLAN      Impression      Elevated ferritin            Plan      Elevated ferritin      Patient has normal iron iron saturation and he shows no mutation on the     hereditary hemochromatosis gene, therefore it is unlikely that this patient has     hereditary hemochromatosis or iron overload.      Ferritin is an acute phase reactant and can be elevated for many reasons      An elevated ferritin is not diagnostic of her particular process, thus no     further evaluation for the elevated ferritin is warranted at this time      No routine office visit follow-up is needed            Patient Education      Patient Education Provided:  Yes            Electronically signed by Jannet Ward  04/13/2021 09:50       Disclaimer: Converted document may not contain table formatting or lab diagrams. Please see [employer] for the authenticated document.

## 2021-05-28 NOTE — PROGRESS NOTES
Patient: ZAKI JUNG     Acct: [id number]     Report: #[id number]  UNIT #: [id number]     : 1945    Encounter Date:10/01/2020  PRIMARY CARE: KAYLAN CAMP  ***Signed***  --------------------------------------------------------------------------------------------------------------------  Chief Complaint      Encounter Date      Oct 1, 2020            Primary Care Provider      KAYLAN CAMP            Referring Provider      KAYLAN CAMP            Patient Complaint      Patient is complaining of      New Patient            VITALS      Height 6 ft  / 182.88 cm      Weight 228 lbs  / 103.492103 kg      BSA 2.25 m2      BMI 30.9 kg/m2      Temperature 97.3 F / 36.28 C - Tympanic      Pulse 64      Respirations 16      Blood Pressure 133/68 Sitting, Left Arm      Pulse Oximetry 96%, room air            HPI      The patient is a 75 year old  male former cigarettes smoker who quit     smoking 20 years ago here for evaluation for wheezing. '            The patient had some wheezing in  with some coughing and dyspnea. Shortness     of breath is worse with walking about 1000 feet, moderate in severity, worse w    ith exertion, improved with rest. Wheezing is mostly during the day. He has     since been started on Symbicort and has had complete resolution of all symptoms.    He currently denies any dyspnea, coughing, wheezing, headaches and hemoptysis,     nausea or vomiting or chest pain. Pulmonary function test were done which were     grossly unremarkable other than a decreased diffusion capacity. He denies any     leg swelling, orthopnea or paroxysmal nocturnal dyspnea. CT scan of the chest     was done showing some very mild emphysema with some scant right base atelectasis    otherwise he is doing well. He is able to perform his ADL's without difficulty     and denies any swollen glands in his lymph nodes, head or neck.            I personally reviewed Review of Systems, family, social,  surgical and medical     history and agree with their findings.            ROS      Constitutional:  Complains of: Fatigue; Denies: Fever, Weight gain, Weight loss,    Chills, Insomnia, Other      Respiratory/Breathing:  Complains of: Wheezing; Denies: Shortness of air, Cough,    Hemoptysis, Pleuritic pain, Other      Endocrine:  Denies: Polydipsia, Polyuria, Heat/cold intolerance, Diabetes, Other      Eyes:  Denies: Blurred vision, Vision Changes, Other      Ears, nose, mouth, throat:  Denies: Mouth lesions, Thrush, Throat pain,     Hoarseness, Allergies/Hay Fever, Post Nasal Drip, Headaches, Recent Head Injury,    Nose Bleeding, Neck Stiffness, Thyroid Mass, Hearing Loss, Ear Fullness, Dry     Mouth, Nasal or Sinus Pain, Dry Lips, Nasal discharge, Nasal congestion, Other      Cardiovascular:  Denies: Palpitations, Syncope, Claudication, Chest Pain, Wake     up Gasping for air, Leg Swelling, Irregular Heart Rate, Cyanosis, Dyspnea on     Exertion, Other      Gastrointestinal:  Denies: Nausea, Constipation, Diarrhea, Abdominal pain,     Vomiting, Difficulty Swallowing, Reflux/Heartburn, Dysphagia, Jaundice,     Bloating, Melena, Bloody stools, Other      Genitourinary:  Denies: Urinary frequency, Incontinence, Hematuria, Urgency,     Nocturia, Dysuria, Testicular problems, Other      Musculoskeletal:  Denies: Joint Pain, Joint Stiffness, Joint Swelling, Myalgias,    Other      Hematologic/lymphatic:  DENIES: Lymphadenopathy, Bruising, Bleeding tendencies,     Other      Neurological:  Denies: Headache, Numbness, Weakness, Seizures, Other      Psychiatric:  Denies: Anxiety, Appropriate Effect, Depression, Other      Sleep:  Yes: Snoring, Insomnia?; No: Excessive daytime sleep, Morning Headache?,    Stop breathing at sleep?, Other      Integumentary:  Denies: Rash, Dry skin, Skin Warm to Touch, Other      Immunologic/Allergic:  Denies: Latex allergy, Seasonal allergies, Asthma,     Urticaria, Eczema, Other       Immunization status:  No: Up to date            FAMILY/SOCIAL/MEDICAL HX      Surgical History:  Yes: Bowel Surgery (COLONOSCOPY), Orthopedic Surgery (LEFT     SHOULDER ARTHROSCOPY), Vascular Surgery (AORTOBIFEM 01/2015)      Stroke - Family Hx:  Mother      Heart - Family Hx:  Sister      Cancer/Type - Family Hx:  Brother (BRAIN, THROAT)      Is Father Still Living?:  No      Is Mother Still Living?:  No       Family History:  Yes      Social History:  No Tobacco Use, No Alcohol Use, No Recreational Drug use      Smoking status:  Former smoker (quit 2000, 1.5 ppd x 40 years)      Anticoagulation Therapy:  No      Antibiotic Prophylaxis:  No      Medical History:  Yes: Arthritis (SPINE), Asthma (INHALER PRN),     Chemotherapy/Cancer (SKIN CA REMOVED ), Hemorrhoids/Rectal Prob (REFLUX-ON MED),    High Blood Pressure (MEDS CONTROL), High Cholesterol, Shortness Of Breath; No:     Blood Disease, Chronic Bronchitis/COPD, Deafness or Ringing Ears, Diabetes,     Seizures, Miscellaneous Medical/oth      Psychiatric History      none            PREVENTION      Hx Influenza Vaccination:  Yes      Date Influenza Vaccine Given:  Sep 1, 2020      Influenza Vaccine Declined:  No      2 or More Falls in Past Year?:  No      Fall Past Year with Injury?:  No      Hx Pneumococcal Vaccination:  Yes      Encouraged to follow-up with:  PCP regarding preventative exams.      Chart initiated by      Asia Suero CMA            ALLERGIES/MEDICATIONS      Allergies:        Coded Allergies:             NO KNOWN ALLERGIES (Unverified , 10/1/20)      Medications    Last Reconciled on 10/1/20 09:31 by RAQUEL GARCIA MD      (iron)   No Conflict Check      28 MG QDAY         Reported         10/1/20       Spironolactone (Aldactone) 100 Mg Tablet      88 MG PO Q2D, #60 TAB         Reported         10/1/20       Icosapent Ethyl (VASCEPA) 1 Gm Capsule      2 GM PO BID for 30 Days, #120 CAP         Reported         10/1/20        Budesonide/Formoterol Fumarate (Symbicort 160/4.5 Mcg) 10.2 Gm Inh      2 PUFF INH BID, #1 INH 0 Refills         Reported         10/1/20       Levothyroxine (Synthroid) 0.088 Mg      0.088 MG PO QDAY@07, #30 TAB 0 Refills         Reported         6/29/20       Meclizine Hcl (Meclizine*) 25 Mg Tablet      25 MG PO QID, #120 TAB 0 Refills         Reported         6/19/17       Allopurinol (Allopurinol) 300 Mg Tablet      300 MG PO QDAY, #30 TAB 0 Refills         Reported         6/19/17       Omeprazole (Omeprazole*) 20 Mg Tablet.dr      20 MG PO BID, #30 CAP 0 Refills         Reported         6/19/17       Propranolol (Inderal) 40 Mg Tab      40 MG PO QDAY, TAB         Reported         3/11/15       Lisinopril* (Lisinopril*) 2.5 Mg Tablet      40 MG PO HS, #60 TAB 0 Refills         Reported         3/11/15       Folic Acid/Multivits-Min/Lut (Multi-Vitamin) 1 Tab Tablet      1 TAB PO QDAY, TAB         Reported         1/15/14       Amitriptyline HCl (Amitriptyline HCl) 50 Mg Tablet      25 MG PO HS, TAB         Reported         1/15/14       Atorvastatin (Atorvastatin) 20 Mg Tab      40 MG PO HS, TAB         Reported         1/15/14       Aspirin EC (Aspirin EC) 81 Mg Tabec      81 MG PO QDAY, TAB 0 Refills         Reported         7/19/09       busPIRone HCl (busPIRone HCl) 10 Mg Tablet      10 MG PO TID, TAB         Reported         7/19/09      Current Medications      Current Medications Reviewed 10/1/20            EXAM      Vital Signs Reviewed      Gen: WDWN, Alert, NAD.        HEENT:  PERRL, EOMI.  OP, nares clear, no sinus tenderness.      Neck:  Supple, no JVD, no thyromegaly.      Lymph: No axillary, cervical, supraclavicular lymphadenopathy noted bilaterally.      Chest:  Good aeration, clear to auscultation bilaterally, tympanic to percussion     bilaterally, no work of breathing noted.      CV:  RRR, no MGR, pulses 2+, equal.      Abd:  Soft, NT, ND, + BS, no HSM.      EXT:  No clubbing, no cyanosis,  no edema, no joint tenderness.       Neuro:  A  Skin: No rashes or lesions.      Vtials      Vitals:             Height 6 ft  / 182.88 cm           Weight 228 lbs  / 103.936784 kg           BSA 2.25 m2           BMI 30.9 kg/m2           Temperature 97.3 F / 36.28 C - Tympanic           Pulse 64           Respirations 16           Blood Pressure 133/68 Sitting, Left Arm           Pulse Oximetry 96%, room air            REVIEW      Results Reviewed      PCCS Results Reviewed?:  Yes Prev Lab Results, Yes Prev Radiology Results, Yes     Previous [facility] Records      Lab Results      I personally reviewed office note from referring provider and labs showed 400     peripheral eosinophils and no evidence of chronic hypoxic respiratory failure.      Radiographic Results               [facility]                PACS RADIOLOGY REPORT            Patient: ZAKI JUNG   Acct: #[id number]   Report: #[id number]            UNIT #: [id number]    DOS: 20 0913      INSURANCE:MEDICARE PART A   LOCATION:CT     : 1945            PROVIDERS      ADMITTING:     ATTENDING: KAYLAN CAMP      FAMILY:  KAYLAN CAMP   ORDERING:  KAYLAN CAMP         OTHER:    DICTATING:  Alicja Judd MD            REQ #:[id number]   EXAM:CHW - CT CHEST with CONTRAST      REASON FOR EXAM:  WHEEZING COUGH      REASON FOR VISIT:  WHEEZING COUGH            *******Signed******         PROCEDURE:   CT CHEST W/ CONTRAST             COMPARISON:   None.             INDICATIONS:   WHEEZING COUGH x 1 MONTH             TECHNIQUE:   After obtaining the patient's consent, CT images were obtained with     non-ionic       intravenous contrast material.               PROTOCOL:     Standard imaging protocol performed                RADIATION:     DLP: 695mGy*cm          Automated exposure control was utilized to minimize radiation dose.       CONTRAST:   100cc Isovue 370 I.V.              FINDINGS:      No well-defined consolidations or significant pleural effusions are observed. No     dominant pulmonary       nodules or abnormal pulmonary masses are seen.  Probable mild emphysematous     changes are present       predominantly within the bilateral upper lobes.  Granulomatous calcifications     are present.             No significant hilar, mediastinal, or axillary lymphadenopathy is seen.      Atherosclerotic       calcifications are present.  Granulomatous calcifications are present.  A normal     aortic arch       branching pattern is noted. The heart appears normal in size. No pericardial     effusion identified.       The thyroid gland is unremarkable. The esophagus is unremarkable.             The limited evaluation of the upper abdomen demonstrates no definitive acute     abnormalities.             No acute osseous abnormalities are seen.                CONCLUSION:         1. No evidence for acute intrathoracic abnormality.      2. Probable mild emphysema.      3. Ancillary findings as described above.              SUSANA CANTOR MD             Electronically Signed and Approved By: SUSANA CANTOR MD on 2020 at 10:21                        Until signed, this is an unconfirmed preliminary report that may contain      errors and is subject to change.                                              KOGIL:      D:20 1021      PFT Results      Patient: ZAKI JUNG   Acct: #[id number]   Report: #[id number]            UNIT #: [id number]    DOS:       LOCATION:[facility]     : 1945            PROVIDERS      ADMITTING:     FAMILY:  KAYLAN CAMP         OTHER:       DICTATING:  Gilson Goetz DO            REASON FOR VISIT:  WHEEZING            *******Signed******                                    [facility]                          Health Information Management Services                            [place], [place]  [address]                __________________________________________________________________________             Patient Name:                   Attending Physician:      Asaf Chilel APRN             Patient Visit # MR #            Admit Date  Disch Date     Location      [id number]    [id number]      08/26/2020                 [facility] -             Date of Birth      1945      __________________________________________________________________________      0821 - DIAGNOSTIC REPORT             PULMONARY FUNCTION TEST             SPIROMETRY:      FEV1/FVC ratio is 79%.      FEV1 is 89% of predicted.      Forced vital capacity is 82 of predicted.      No response to bronchodilator therapy seen.             LUNG VOLUMES:      Total lung capacity is 82% of predicted.      Residual volume is 87% of predicted.             DIFFUSION:      DLCO is 51% of predicted.             IMPRESSION:      1.  Spirometry shows no evidence of obstruction.      2.  There is no significant response to bronchodilator therapy seen.      3.  Lung volumes show no restriction.      4.  Diffusion capacity is moderately decreased.      5.  Given the mild reduction in DLCO and lack of obstruction or restriction,          this can be seen in emphysema without obstruction, anemia, congestive          heart failure, ILD, and pulmonary hypertension.      6.  Please correlate clinically.             To be electronically signed in [employer]      [id number] STEPHANIE GOETZ D.O.             WC:ts      D:  08/27/2020 12:56      T:  08/27/2020 13:57      #[id number]             Until signed, this is an unconfirmed preliminary report that may contain      errors and is subject to change.                   Until signed, this is an unconfirmed preliminary report that may contain      errors and is subject to change.                     <Electronically signed by Stephanie Goetz DO>                08/28/20 1154               Stephanie Goetz DO                                                                   CUNWB:TRS      D:08/27/20 1256            Assessment      Notes      New Medications      * Budesonide/Formoterol Fumarate (Symbicort 160/4.5 Mcg) 10.2 GM INH: 2 PUFF INH       BID #1      * Icosapent Ethyl (VASCEPA) 1 GM CAPSULE: 2 GM PO BID 30 Days #120         Instructions: Take with meals.      * SPIRONOLACTONE (Aldactone) 100 MG TABLET: 88 MG PO Q2D #60      * (iron): 28 MG QDAY      Changed Medications      * Lisinopril* 2.5 MG TABLET: 40 MG PO HS #60      Discontinued Medications      * Levalbuterol Tartrate (Xopenex HFA) 15 GM HFA.AER.AD: 2 PUFFS INH RTQ4H PRN       SHORTNESS OF BREATH #1      IMPRESSION:      1. Wheezing resolved.       2. Cough resolved.       3. Dyspnea resolved.       4. Question mild intermittent asthma.       5. Emphysema with no evidence of chronic obstructive pulmonary disease. He has     mostly asthma symptoms that are now controlled with Symbicort.       6. Remote tobacco abuse of cigarettes in remission.             PLAN:      1. Continue Symbicort as he has no respiratory  symptoms while on Symbicort.     Continue 2 puffs twice daily, inhaler education provided today.      2. No indication for lung cancer screening or repeating CT scan of the chest.       3. Pulmonary function test showed no evidence of chronic obstructive pulmonary     disease. I do question if he has some mild bronchial asthma given resolution of     symptoms with Symbicort. Symbicort has also helped with his recurrent bronchitis     that he has discussed with me.       4. He is up to date with Prevnar and Pneumovax. He will get his Fluzone     vaccination done by his primary care provider per him.       5. Follow up with me as needed.            Patient Education      ACO BMI High above 25:  Counseling Given, Encouraged weight loss, Encourage     dietary changes            Electronically signed by RAQUEL GARCIA  10/01/2020 15:30       Disclaimer: Converted  document may not contain table formatting or lab diagrams. Please see [employer] for the authenticated document.

## 2021-06-15 RX ORDER — BUSPIRONE HYDROCHLORIDE 10 MG/1
TABLET ORAL
Qty: 270 TABLET | Refills: 0 | Status: SHIPPED | OUTPATIENT
Start: 2021-06-15 | End: 2021-09-13 | Stop reason: SDUPTHER

## 2021-06-25 RX ORDER — FERROUS SULFATE 325(65) MG
TABLET ORAL
Qty: 90 TABLET | Refills: 1 | Status: SHIPPED | OUTPATIENT
Start: 2021-06-25 | End: 2021-07-09 | Stop reason: SDUPTHER

## 2021-06-25 RX ORDER — FERROUS SULFATE 325(65) MG
TABLET ORAL
COMMUNITY
Start: 2021-04-02 | End: 2021-12-07

## 2021-07-06 RX ORDER — OMEPRAZOLE 20 MG/1
CAPSULE, DELAYED RELEASE ORAL
Qty: 180 CAPSULE | Refills: 2 | Status: SHIPPED | OUTPATIENT
Start: 2021-07-06 | End: 2022-04-04 | Stop reason: SDUPTHER

## 2021-07-09 ENCOUNTER — OFFICE VISIT (OUTPATIENT)
Dept: FAMILY MEDICINE CLINIC | Facility: CLINIC | Age: 76
End: 2021-07-09

## 2021-07-09 VITALS
SYSTOLIC BLOOD PRESSURE: 99 MMHG | OXYGEN SATURATION: 98 % | DIASTOLIC BLOOD PRESSURE: 57 MMHG | BODY MASS INDEX: 31.04 KG/M2 | HEIGHT: 72 IN | HEART RATE: 59 BPM | WEIGHT: 229.2 LBS

## 2021-07-09 DIAGNOSIS — M10.9 GOUT, UNSPECIFIED CAUSE, UNSPECIFIED CHRONICITY, UNSPECIFIED SITE: ICD-10-CM

## 2021-07-09 DIAGNOSIS — E78.00 HIGH CHOLESTEROL: ICD-10-CM

## 2021-07-09 DIAGNOSIS — E78.1 HYPERTRIGLYCERIDEMIA: ICD-10-CM

## 2021-07-09 DIAGNOSIS — I10 ESSENTIAL HYPERTENSION: Primary | ICD-10-CM

## 2021-07-09 DIAGNOSIS — D50.8 OTHER IRON DEFICIENCY ANEMIA: ICD-10-CM

## 2021-07-09 DIAGNOSIS — K21.9 GASTROESOPHAGEAL REFLUX DISEASE, UNSPECIFIED WHETHER ESOPHAGITIS PRESENT: ICD-10-CM

## 2021-07-09 DIAGNOSIS — I73.9 PERIPHERAL VASCULAR DISEASE (HCC): ICD-10-CM

## 2021-07-09 DIAGNOSIS — C44.90 SKIN CANCER: ICD-10-CM

## 2021-07-09 DIAGNOSIS — E03.9 HYPOTHYROIDISM, UNSPECIFIED TYPE: ICD-10-CM

## 2021-07-09 DIAGNOSIS — R73.01 IMPAIRED FASTING GLUCOSE: ICD-10-CM

## 2021-07-09 DIAGNOSIS — G57.61 MORTON'S NEUROMA OF RIGHT FOOT: ICD-10-CM

## 2021-07-09 PROBLEM — M25.559 HIP PAIN: Status: ACTIVE | Noted: 2021-07-09

## 2021-07-09 PROBLEM — F41.9 ANXIETY: Status: ACTIVE | Noted: 2021-07-09

## 2021-07-09 PROCEDURE — G0439 PPPS, SUBSEQ VISIT: HCPCS | Performed by: NURSE PRACTITIONER

## 2021-07-09 PROCEDURE — 99214 OFFICE O/P EST MOD 30 MIN: CPT | Performed by: NURSE PRACTITIONER

## 2021-07-09 RX ORDER — AMITRIPTYLINE HYDROCHLORIDE 25 MG/1
25 TABLET, FILM COATED ORAL
COMMUNITY
Start: 2021-06-06 | End: 2022-03-14 | Stop reason: SDUPTHER

## 2021-07-09 RX ORDER — ICOSAPENT ETHYL 1000 MG/1
2 CAPSULE ORAL 2 TIMES DAILY
COMMUNITY
Start: 2021-07-05 | End: 2021-10-04

## 2021-07-09 RX ORDER — BUDESONIDE AND FORMOTEROL FUMARATE DIHYDRATE 80; 4.5 UG/1; UG/1
2 AEROSOL RESPIRATORY (INHALATION)
COMMUNITY
End: 2021-12-07 | Stop reason: SDUPTHER

## 2021-07-09 RX ORDER — KETOCONAZOLE 20 MG/G
1 CREAM TOPICAL DAILY
COMMUNITY
Start: 2021-05-13

## 2021-07-09 RX ORDER — MULTIPLE VITAMINS W/ MINERALS TAB 9MG-400MCG
1 TAB ORAL DAILY
COMMUNITY

## 2021-07-09 RX ORDER — MECLIZINE HYDROCHLORIDE 25 MG/1
25 TABLET ORAL 4 TIMES DAILY
COMMUNITY
Start: 2021-05-25 | End: 2021-08-19

## 2021-07-09 RX ORDER — KETOCONAZOLE 20 MG/ML
1 SHAMPOO TOPICAL WEEKLY
COMMUNITY
Start: 2021-05-13

## 2021-07-09 RX ORDER — LISINOPRIL 40 MG/1
40 TABLET ORAL DAILY
COMMUNITY
Start: 2021-06-04 | End: 2021-11-24

## 2021-07-09 RX ORDER — PROPRANOLOL HYDROCHLORIDE 40 MG/1
TABLET ORAL
COMMUNITY
Start: 2021-05-27 | End: 2021-08-27

## 2021-07-09 RX ORDER — ASPIRIN 81 MG/1
81 TABLET ORAL DAILY
COMMUNITY

## 2021-07-09 RX ORDER — ALBUTEROL SULFATE 90 UG/1
2 AEROSOL, METERED RESPIRATORY (INHALATION) EVERY 4 HOURS PRN
COMMUNITY
End: 2021-08-16

## 2021-07-09 RX ORDER — CLINDAMYCIN PHOSPHATE 11.9 MG/ML
SOLUTION TOPICAL
COMMUNITY
Start: 2021-05-13 | End: 2021-12-07

## 2021-07-09 RX ORDER — LEVOTHYROXINE SODIUM 88 MCG
88 TABLET ORAL
COMMUNITY
Start: 2021-05-27

## 2021-07-09 RX ORDER — ALLOPURINOL 300 MG/1
300 TABLET ORAL DAILY
COMMUNITY
Start: 2021-06-14 | End: 2021-09-07

## 2021-07-09 RX ORDER — SPIRONOLACTONE 25 MG/1
TABLET ORAL
COMMUNITY
Start: 2021-05-17 | End: 2021-08-10

## 2021-07-09 RX ORDER — ATORVASTATIN CALCIUM 40 MG/1
40 TABLET, FILM COATED ORAL
COMMUNITY
Start: 2021-04-14 | End: 2021-09-22 | Stop reason: DRUGHIGH

## 2021-07-09 NOTE — PROGRESS NOTES
The ABCs of the Annual Wellness Visit  Subsequent Medicare Wellness Visit    Chief Complaint   Patient presents with   • Medicare Wellness-subsequent   • Follow-up     4 month        Subjective   History of Present Illness:  Asaf Chilel is a 76 y.o. male who presents for a Subsequent Medicare Wellness Visit.    HEALTH RISK ASSESSMENT    Recent Hospitalizations:  No hospitalization(s) within the last year.    Current Medical Providers:  Patient Care Team:  Concetta Carson APRN as PCP - General (Nurse Practitioner)    Smoking Status:  Social History     Tobacco Use   Smoking Status Former Smoker   • Packs/day: 1.00   • Years: 44.00   • Pack years: 44.00   • Types: Cigarettes   • Start date:    • Quit date:    • Years since quittin.5   Smokeless Tobacco Never Used       Alcohol Consumption:  Social History     Substance and Sexual Activity   Alcohol Use Not Currently       Depression Screen:   PHQ-2/PHQ-9 Depression Screening 2021   Little interest or pleasure in doing things 0   Feeling down, depressed, or hopeless 0   Total Score 0       Fall Risk Screen:  STEADI Fall Risk Assessment was completed, and patient is at LOW risk for falls.Assessment completed on:2021    Health Habits and Functional and Cognitive Screening:  No flowsheet data found.      Does the patient have evidence of cognitive impairment? No    Asprin use counseling:Taking ASA appropriately as indicated    Age-appropriate Screening Schedule:  Refer to the list below for future screening recommendations based on patient's age, sex and/or medical conditions. Orders for these recommended tests are listed in the plan section. The patient has been provided with a written plan.    Health Maintenance   Topic Date Due   • TDAP/TD VACCINES (1 - Tdap) Never done   • ZOSTER VACCINE (2 of 3) 2009   • DIABETIC FOOT EXAM  Never done   • INFLUENZA VACCINE  2021   • HEMOGLOBIN A1C  2021   • URINE MICROALBUMIN   10/24/2021   • DIABETIC EYE EXAM  02/25/2022   • LIPID PANEL  03/09/2022          The following portions of the patient's history were reviewed and updated as appropriate: allergies, current medications, past family history, past medical history, past social history, past surgical history and problem list.    Outpatient Medications Prior to Visit   Medication Sig Dispense Refill   • albuterol sulfate  (90 Base) MCG/ACT inhaler Inhale 2 puffs Every 4 (Four) Hours As Needed for Wheezing.     • aspirin 81 MG EC tablet Take 81 mg by mouth Daily.     • budesonide-formoterol (SYMBICORT) 80-4.5 MCG/ACT inhaler Inhale 2 puffs 2 (Two) Times a Day. PRN     • Fluticasone-Umeclidin-Vilant (Trelegy Ellipta) 100-62.5-25 MCG/INH inhaler PRN     • multivitamin with minerals (MULTIVITAMIN ADULT PO) Take 1 tablet by mouth Daily.     • allopurinol (ZYLOPRIM) 300 MG tablet Take 300 mg by mouth Daily.     • amitriptyline (ELAVIL) 25 MG tablet Take 25 mg by mouth every night at bedtime.     • atorvastatin (LIPITOR) 40 MG tablet Take 40 mg by mouth every night at bedtime.     • busPIRone (BUSPAR) 10 MG tablet TAKE 1 TABLET BY MOUTH 3 TIMES A  tablet 0   • clindamycin (CLEOCIN T) 1 % external solution APPLY THIN COAT TO AFFECTED AREA TWICE A DAY     • ferrous sulfate 325 (65 FE) MG tablet ferrous sulfate 325 mg (65 mg iron) oral tablet take 1 tablet (325 mg) by oral route once daily for 90 days 4/2/2021  Active     • ketoconazole (NIZORAL) 2 % cream APPLY BY TOPICALLY 2TIMES EVERY DAY DURING FLARE UPS, THEN 2TIMES PER WEEK FOR MAINTENANCE TREATMENT     • ketoconazole (NIZORAL) 2 % shampoo WASH EVERY OTHER DAY, ALTERNATE WITH OTC SHAMPOO OF YOUR CHOOSING     • lisinopril (PRINIVIL,ZESTRIL) 40 MG tablet Take 40 mg by mouth Daily.     • meclizine (ANTIVERT) 25 MG tablet Take 25 mg by mouth 4 (Four) Times a Day.     • omeprazole (priLOSEC) 20 MG capsule TAKE 2 CAPSULES BY MOUTH EVERY DAY BEFORE MEALS 180 capsule 2   • propranolol  "(INDERAL) 40 MG tablet TAKE 1 TABLET BY MOUTH 3 TIMES PER DAY     • spironolactone (ALDACTONE) 25 MG tablet TAKE 1 TABLET BY MOUTH EVERY OTHER DAY, OR AS DIRECTED     • Synthroid 88 MCG tablet TAKE ONE TABLET BY MOUTH MON SAT. ON SUNDAY TAKE 1/2 TABLET     • Vascepa 1 g capsule capsule      • ferrous sulfate 325 (65 FE) MG tablet TAKE 1 TABLET BY MOUTH EVERY DAY *NOT COVERED BYINSURANCE 90 tablet 1     No facility-administered medications prior to visit.       Patient Active Problem List   Diagnosis   • Hypertension   • Gastroesophageal reflux   • Anxiety   • Anemia, iron deficiency   • Gout   • High cholesterol   • Hip pain   • Low back pain   • Peripheral vascular disease (CMS/HCC)   • Skin cancer   • Hypothyroid   • Hypertriglyceridemia   • Impaired fasting glucose   • Rodríguez's neuroma of right foot       Advanced Care Planning:  ACP discussion was held with the patient during this visit. Patient does not have an advance directive, information provided.    Review of Systems    Compared to one year ago, the patient feels his physical health is the same.  Compared to one year ago, the patient feels his mental health is the same.    Reviewed chart for potential of high risk medication in the elderly: yes  Reviewed chart for potential of harmful drug interactions in the elderly:yes    Objective         Vitals:    07/09/21 0918 07/09/21 0919   BP:  99/57   BP Location:  Left arm   Patient Position:  Sitting   Cuff Size:  Adult   Pulse:  59   SpO2:  98%   Weight:  104 kg (229 lb 3.2 oz)   Height:  182.9 cm (72\")   PainSc: 0-No pain        Body mass index is 31.09 kg/m².  Discussed the patient's BMI with him. The BMI is above average; BMI management plan is completed.    Physical Exam      States he saw  in Jan-an echo was done at that time-reviewed echo-showed ejection fraction of 50% and fibrocalcific changes of the aortic valve w/o significant stenosis-he recommended replacement of the valve in 2-10 yrs. He " has a f/u w/ in Sept.     Skin cancer removed off left cheek in March and one on the right side last yr.     Gout-last flare 15 yrs ago.     Hx shingles left inner thigh more than 15 yrs ago.    HTN-b/p well controlled on lisinopril and spirolactone.    Hypothyroid-well controlled on current dose of synthroid,    Iron def anemia-takes iron def once daily.     [facility] gave him amitriptylline 4 tabs day for balance 5 yrs ago and has not had any issues with it since.     Assessment/Plan   Medicare Risks and Personalized Health Plan  CMS Preventative Services Quick Reference  Advance Directive Discussion  Immunizations Discussed/Encouraged (specific immunizations; Tdap, Influenza, Pneumococcal 23, Shingrix and COVID19 )    The above risks/problems have been discussed with the patient.  Pertinent information has been shared with the patient in the After Visit Summary.  Follow up plans and orders are seen below in the Assessment/Plan Section.    Diagnoses and all orders for this visit:    1. Essential hypertension (Primary)    2. Hypertriglyceridemia    3. Hypothyroidism, unspecified type    4. Impaired fasting glucose  Comments:  last A1C was 5.7%    5. Gastroesophageal reflux disease, unspecified whether esophagitis present    6. Other iron deficiency anemia    7. Skin cancer    8. Gout, unspecified cause, unspecified chronicity, unspecified site    9. High cholesterol    10. Peripheral vascular disease (CMS/[facility])    11. Rodríguez's neuroma of right foot  Comments:  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       Follow Up:  Return in about 4 months (around 11/9/2021).     An After Visit Summary and PPPS were given to the patient.

## 2021-08-10 RX ORDER — SPIRONOLACTONE 25 MG/1
TABLET ORAL
Qty: 45 TABLET | Refills: 2 | Status: SHIPPED | OUTPATIENT
Start: 2021-08-10 | End: 2022-02-21

## 2021-08-16 DIAGNOSIS — J45.909 ASTHMA, UNSPECIFIED ASTHMA SEVERITY, UNSPECIFIED WHETHER COMPLICATED, UNSPECIFIED WHETHER PERSISTENT: Primary | ICD-10-CM

## 2021-08-16 RX ORDER — ALBUTEROL SULFATE 90 UG/1
AEROSOL, METERED RESPIRATORY (INHALATION)
Qty: 1 G | Refills: 5 | Status: SHIPPED | OUTPATIENT
Start: 2021-08-16 | End: 2022-07-27

## 2021-08-19 RX ORDER — MECLIZINE HYDROCHLORIDE 25 MG/1
TABLET ORAL
Qty: 360 TABLET | Refills: 0 | Status: SHIPPED | OUTPATIENT
Start: 2021-08-19 | End: 2021-11-23

## 2021-08-27 DIAGNOSIS — I10 HYPERTENSION, UNSPECIFIED TYPE: Primary | ICD-10-CM

## 2021-08-27 RX ORDER — PROPRANOLOL HYDROCHLORIDE 40 MG/1
TABLET ORAL
Qty: 270 TABLET | Refills: 1 | Status: SHIPPED | OUTPATIENT
Start: 2021-08-27 | End: 2021-12-07

## 2021-09-07 RX ORDER — ALLOPURINOL 300 MG/1
TABLET ORAL
Qty: 90 TABLET | Refills: 0 | Status: SHIPPED | OUTPATIENT
Start: 2021-09-07 | End: 2021-12-01

## 2021-09-13 DIAGNOSIS — F41.9 ANXIETY: Primary | ICD-10-CM

## 2021-09-13 RX ORDER — BUSPIRONE HYDROCHLORIDE 10 MG/1
10 TABLET ORAL 3 TIMES DAILY
Qty: 270 TABLET | Refills: 0 | Status: SHIPPED | OUTPATIENT
Start: 2021-09-13 | End: 2021-12-07

## 2021-09-15 ENCOUNTER — LAB (OUTPATIENT)
Dept: LAB | Facility: HOSPITAL | Age: 76
End: 2021-09-15

## 2021-09-15 ENCOUNTER — TRANSCRIBE ORDERS (OUTPATIENT)
Dept: LAB | Facility: HOSPITAL | Age: 76
End: 2021-09-15

## 2021-09-15 DIAGNOSIS — Z79.899 ENCOUNTER FOR LONG-TERM (CURRENT) USE OF OTHER MEDICATIONS: ICD-10-CM

## 2021-09-15 DIAGNOSIS — E78.5 HYPERLIPIDEMIA, UNSPECIFIED HYPERLIPIDEMIA TYPE: ICD-10-CM

## 2021-09-15 DIAGNOSIS — E78.5 HYPERLIPIDEMIA, UNSPECIFIED HYPERLIPIDEMIA TYPE: Primary | ICD-10-CM

## 2021-09-15 LAB
ALBUMIN SERPL-MCNC: 4.5 G/DL (ref 3.5–5.2)
ALBUMIN/GLOB SERPL: 1.7 G/DL
ALP SERPL-CCNC: 64 U/L (ref 39–117)
ALT SERPL W P-5'-P-CCNC: 16 U/L (ref 1–41)
ANION GAP SERPL CALCULATED.3IONS-SCNC: 12 MMOL/L (ref 5–15)
AST SERPL-CCNC: 18 U/L (ref 1–40)
BILIRUB SERPL-MCNC: 0.4 MG/DL (ref 0–1.2)
BUN SERPL-MCNC: 21 MG/DL (ref 8–23)
BUN/CREAT SERPL: 17.6 (ref 7–25)
CALCIUM SPEC-SCNC: 9.5 MG/DL (ref 8.6–10.5)
CHLORIDE SERPL-SCNC: 103 MMOL/L (ref 98–107)
CHOLEST SERPL-MCNC: 150 MG/DL (ref 0–200)
CO2 SERPL-SCNC: 22 MMOL/L (ref 22–29)
CREAT SERPL-MCNC: 1.19 MG/DL (ref 0.76–1.27)
GFR SERPL CREATININE-BSD FRML MDRD: 60 ML/MIN/1.73
GLOBULIN UR ELPH-MCNC: 2.6 GM/DL
GLUCOSE SERPL-MCNC: 93 MG/DL (ref 65–99)
HDLC SERPL-MCNC: 37 MG/DL (ref 40–60)
LDLC SERPL CALC-MCNC: 75 MG/DL (ref 0–100)
LDLC/HDLC SERPL: 1.79 {RATIO}
POTASSIUM SERPL-SCNC: 4.6 MMOL/L (ref 3.5–5.2)
PROT SERPL-MCNC: 7.1 G/DL (ref 6–8.5)
SODIUM SERPL-SCNC: 137 MMOL/L (ref 136–145)
TRIGL SERPL-MCNC: 233 MG/DL (ref 0–150)
VLDLC SERPL-MCNC: 38 MG/DL (ref 5–40)

## 2021-09-15 PROCEDURE — 36415 COLL VENOUS BLD VENIPUNCTURE: CPT

## 2021-09-15 PROCEDURE — 80053 COMPREHEN METABOLIC PANEL: CPT

## 2021-09-15 PROCEDURE — 80061 LIPID PANEL: CPT

## 2021-09-21 PROBLEM — I42.9 CARDIOMYOPATHY: Chronic | Status: ACTIVE | Noted: 2021-09-21

## 2021-09-22 ENCOUNTER — OFFICE VISIT (OUTPATIENT)
Dept: CARDIOLOGY | Facility: CLINIC | Age: 76
End: 2021-09-22

## 2021-09-22 VITALS
HEART RATE: 73 BPM | HEIGHT: 72 IN | DIASTOLIC BLOOD PRESSURE: 72 MMHG | SYSTOLIC BLOOD PRESSURE: 146 MMHG | BODY MASS INDEX: 31.42 KG/M2 | WEIGHT: 232 LBS

## 2021-09-22 DIAGNOSIS — I10 ESSENTIAL HYPERTENSION: Primary | Chronic | ICD-10-CM

## 2021-09-22 DIAGNOSIS — E78.00 HIGH CHOLESTEROL: Chronic | ICD-10-CM

## 2021-09-22 DIAGNOSIS — I42.9 CARDIOMYOPATHY, UNSPECIFIED TYPE (HCC): Chronic | ICD-10-CM

## 2021-09-22 PROBLEM — E78.1 HYPERTRIGLYCERIDEMIA: Status: RESOLVED | Noted: 2021-07-09 | Resolved: 2021-09-22

## 2021-09-22 PROCEDURE — 99214 OFFICE O/P EST MOD 30 MIN: CPT | Performed by: NURSE PRACTITIONER

## 2021-09-22 RX ORDER — ATORVASTATIN CALCIUM 80 MG/1
80 TABLET, FILM COATED ORAL DAILY
Qty: 90 TABLET | Refills: 3 | Status: SHIPPED | OUTPATIENT
Start: 2021-09-22 | End: 2022-03-17 | Stop reason: SDUPTHER

## 2021-09-22 NOTE — ASSESSMENT & PLAN NOTE
Uncertain control.  He states most of his readings are in good range.  He will do blood pressure log and will adjust meds accordingly.  Continue spironolactone 25 mg every other day, propanolol 40 3 times daily, and lisinopril 40 once a day.

## 2021-09-22 NOTE — PROGRESS NOTES
Chief Complaint  Follow-up (Post labs), Hypertension, Hyperlipidemia, and Cardiomyopathy    Subjective            Asaf Chilel presents to [facility]  He is a 76-year-old white male who comes in continues complain of shortness of breath only when he overexerts himself.  He sits mild and long-term for him.  Has fatigue which is constant and unchanged.  Denies chest pain, palpitations, swelling, dizziness, syncope, PND, and orthopnea.  His weight is up 2 pounds since his last visit.  Had both Covid vaccines.              Past History:    Past Medical History:   Diagnosis Date   • Anxiety    • Cardiomyopathy (CMS/HCC)    • Gastroesophageal reflux    • Gout    • High cholesterol    • Hip pain    • Hoarseness, chronic    • Hypertension    • Hypothyroid    • AJ (iron deficiency anemia) 05/07/2015   • Limb pain    • Lumbago 09/03/2013    MRI shows abnormal appearance of bone marrow. Workup negative pet hem/onc   • Peripheral vascular disease (CMS/HCC) 03/28/2015   • Sciatica 09/03/2013   • Tremor         Family History: family history includes Cancer in an other family member; Heart failure in an other family member; Other in an other family member.     Social History: reports that he quit smoking about 21 years ago. His smoking use included cigarettes. He started smoking about 65 years ago. He has a 44.00 pack-year smoking history. He has never used smokeless tobacco. He reports previous alcohol use. He reports that he does not use drugs.    Allergies: Patient has no known allergies.      Past Surgical History:   Procedure Laterality Date   • COLONOSCOPY  2014    normal Dr. Bingham   • SHOULDER SURGERY          Prior to Admission medications    Medication Sig Start Date End Date Taking? Authorizing Provider   albuterol sulfate  (90 Base) MCG/ACT inhaler INHALE 1 TO 2 PUFFS BY MOUTH EVERY 6 HOURS AS NEEDED 8/16/21  Yes Concetta Carson APRN   allopurinol (ZYLOPRIM) 300 MG  tablet TAKE 1 TABLET BY MOUTH EVERY DAY 9/7/21  Yes Concetta Carson APRN   amitriptyline (ELAVIL) 25 MG tablet Take 25 mg by mouth every night at bedtime. 6/6/21  Yes ProviderJona MD   aspirin 81 MG EC tablet Take 81 mg by mouth Daily.   Yes Provider, MD Jona   atorvastatin (LIPITOR) 40 MG tablet Take 40 mg by mouth every night at bedtime. 4/14/21  Yes ProviderJona MD   budesonide-formoterol (SYMBICORT) 80-4.5 MCG/ACT inhaler Inhale 2 puffs 2 (Two) Times a Day. PRN   Yes ProviderJona MD   busPIRone (BUSPAR) 10 MG tablet Take 1 tablet by mouth 3 (Three) Times a Day. 9/13/21  Yes Concetta Carson APRN   clindamycin (CLEOCIN T) 1 % external solution APPLY THIN COAT TO AFFECTED AREA TWICE A DAY 5/13/21  Yes Provider, MD Jona   ferrous sulfate 325 (65 FE) MG tablet ferrous sulfate 325 mg (65 mg iron) oral tablet take 1 tablet (325 mg) by oral route once daily for 90 days 4/2/2021  Active 4/2/21  Yes ProviderJona MD   Fluticasone-Umeclidin-Vilant (TRELEGY ELLIPTA IN) Inhale.   Yes Provider, MD Jona   Fluticasone-Umeclidin-Vilant (Trelegy Ellipta) 100-62.5-25 MCG/INH inhaler PRN 4/6/21  Yes Provider, MD Jona   ketoconazole (NIZORAL) 2 % cream APPLY BY TOPICALLY 2TIMES EVERY DAY DURING FLARE UPS, THEN 2TIMES PER WEEK FOR MAINTENANCE TREATMENT 5/13/21  Yes Provider, MD Jona   ketoconazole (NIZORAL) 2 % shampoo WASH EVERY OTHER DAY, ALTERNATE WITH OTC SHAMPOO OF YOUR CHOOSING 5/13/21  Yes ProviderJona MD   lisinopril (PRINIVIL,ZESTRIL) 40 MG tablet Take 40 mg by mouth Daily. 6/4/21  Yes Jona Gibson MD   meclizine (ANTIVERT) 25 MG tablet TAKE 1 TABLET BY MOUTH FOUR TIMES A DAY 8/19/21  Yes Concetta Carosn APRN   multivitamin with minerals (MULTIVITAMIN ADULT PO) Take 1 tablet by mouth Daily.   Yes Provider, MD Jona   omeprazole (priLOSEC) 20 MG capsule TAKE 2 CAPSULES BY MOUTH EVERY DAY BEFORE MEALS 7/6/21   "Yes Concetta Carson APRN   propranolol (INDERAL) 40 MG tablet TAKE 1 TABLET BY MOUTH 3 TIMES PER DAY 8/27/21  Yes Concetta Carson APRN   spironolactone (ALDACTONE) 25 MG tablet TAKE 1 TABLET BY MOUTH EVERY OTHER DAY, OR AS DIRECTED 8/10/21  Yes Malaika Florentino APRN   Synthroid 88 MCG tablet TAKE ONE TABLET BY MOUTH MON SAT. ON SUNDAY TAKE 1/2 TABLET 5/27/21  Yes Provider, Historical, MD   Vascepa 1 g capsule capsule Take 2 g by mouth 2 (two) times a day. 7/5/21  Yes Provider, Historical, MD        Review of Systems   Constitutional: Positive for fatigue.   Respiratory: Positive for shortness of breath (With exertion).    Cardiovascular: Negative for chest pain, palpitations and leg swelling.   All other systems reviewed and are negative.       Objective     Physical Exam  Constitutional:       General: He is not in acute distress.     Appearance: He is obese.   Cardiovascular:      Rate and Rhythm: Normal rate and regular rhythm.      Heart sounds: Normal heart sounds.   Pulmonary:      Effort: Pulmonary effort is normal.      Breath sounds: Normal breath sounds.   Musculoskeletal:      Right lower leg: No edema.      Left lower leg: No edema.   Neurological:      Mental Status: He is alert.       /72   Pulse 73   Ht 182.9 cm (72\")   Wt 105 kg (232 lb)   BMI 31.46 kg/m²       Vitals:    09/22/21 0910   BP: 146/72   Pulse: 73       Result Review :         The following data was reviewed by: YASH Holt on 09/22/2021:      Lab Results   Component Value Date    BNP 43 12/01/2020     CMP    CMP 3/9/21 3/12/21 9/15/21   Glucose   93   Glucose 98     BUN 16  21   Creatinine 1.15  1.19   eGFR Non African Am   60 (A)   Sodium 140  137   Potassium 4.6  4.6   Chloride 105  103   Calcium 9.2  9.5   Albumin 4.2 4.2 4.50   Total Bilirubin 0.36 0.49 0.4   Alkaline Phosphatase 68 67 64   AST (SGOT) 21 19 18   ALT (SGPT) 19 17 16   (A) Abnormal value            CBC w/diff    CBC w/Diff 3/9/21 "   WBC 8.35   RBC 4.82   Hemoglobin 14.1   Hematocrit 44.9   MCV 93.2   MCH 29.3   MCHC 31.4 (A)   RDW 13.5   Platelets 214   Neutrophil Rel % 62.2   Lymphocyte Rel % 26.6   Monocyte Rel % 6.6   Eosinophil Rel % 3.4   Basophil Rel % 0.6   (A) Abnormal value             Lipid Panel    Lipid Panel 12/1/20 3/9/21 9/15/21   Total Cholesterol   150   Total Cholesterol 149 142    Triglycerides 227 (A) 234 (A) 233 (A)   HDL Cholesterol 38 (A) 38 (A) 37 (A)   VLDL Cholesterol 45 (A) 47 (A) 38   LDL Cholesterol  66 (A) 57 (A) 75   LDL/HDL Ratio   1.79   (A) Abnormal value       Comments are available for some flowsheets but are not being displayed.            Lab Results   Component Value Date    TSH 1.190 03/09/2021    TSH 2.470 12/01/2020    TSH 0.674 08/14/2020      Lab Results   Component Value Date    FREET4 1.3 12/01/2020      No results found for: DDIMERQUANT  No results found for: MG   No results found for: DIGOXIN   A1C Last 3 Results    HGBA1C Last 3 Results 3/9/21   Hemoglobin A1C 5.7      Comments are available for some flowsheets but are not being displayed.                                Assessment and Plan        Diagnoses and all orders for this visit:    1. Essential hypertension (Primary)  Assessment & Plan:  Uncertain control.  He states most of his readings are in good range.  He will do blood pressure log and will adjust meds accordingly.  Continue spironolactone 25 mg every other day, propanolol 40 3 times daily, and lisinopril 40 once a day.    Orders:  -     Comprehensive Metabolic Panel; Future  -     Comprehensive Metabolic Panel; Future    2. High cholesterol  Assessment & Plan:  Mixed and not to goal.  Increase atorvastatin to 80 mg for tighter control of his lipids.  We will check a lipid CMP in 3 months    Orders:  -     atorvastatin (LIPITOR) 80 MG tablet; Take 1 tablet by mouth Daily.  Dispense: 90 tablet; Refill: 3  -     Lipid Panel; Future  -     Comprehensive Metabolic Panel; Future  -      Lipid Panel; Future  -     Comprehensive Metabolic Panel; Future    3. Cardiomyopathy, unspecified type (CMS/HCC)  Assessment & Plan:  Shortness of breath is stable.  Continue propanolol.              Follow Up     Return in about 9 months (around 6/22/2022) for with Dr. Duvall.    Patient was given instructions and counseling regarding his condition or for health maintenance advice. Please see specific information pulled into the AVS if appropriate.       YASH Santiago  09/22/21 09:16 EDT

## 2021-09-22 NOTE — PATIENT INSTRUCTIONS
Increase atorvastatin 40 mg to 2 tabs at night until your bottle is done and then there is a new prescription of 80 mg at the pharmacy

## 2021-09-22 NOTE — ASSESSMENT & PLAN NOTE
Mixed and not to goal.  Increase atorvastatin to 80 mg for tighter control of his lipids.  We will check a lipid CMP in 3 months

## 2021-10-04 RX ORDER — ICOSAPENT ETHYL 1000 MG/1
CAPSULE ORAL
Qty: 360 CAPSULE | Refills: 3 | Status: SHIPPED | OUTPATIENT
Start: 2021-10-04 | End: 2022-07-27

## 2021-10-21 ENCOUNTER — TELEPHONE (OUTPATIENT)
Dept: CARDIOLOGY | Facility: CLINIC | Age: 76
End: 2021-10-21

## 2021-11-11 ENCOUNTER — TELEPHONE (OUTPATIENT)
Dept: CARDIOLOGY | Facility: CLINIC | Age: 76
End: 2021-11-11

## 2021-11-11 NOTE — TELEPHONE ENCOUNTER
Received surgical clearance. Called and requested clarification on procedure being done and type of anesthesia.

## 2021-11-15 NOTE — TELEPHONE ENCOUNTER
Procedure: Excisional Biopsy-Malignant procedure (Local anesthesia)    Med Directive: ASA requesting 14 days.    PMH: Cardiomyopathy; HLD; HTN; PVD    Last Seen: 09/22/2021    Can you please address since Dr. Duvall is not available?

## 2021-11-16 NOTE — TELEPHONE ENCOUNTER
Mr. Chilel may proceed with excisional biopsy as planned.  No further preoperative cardiac work-up required before the anticipated surgery.  He will be at a low to moderate risk for perioperative cardiac events.    If needed, aspirin can be held for 14 days before the procedure.  Patient has no history of coronary artery disease or any documented atherosclerotic cardiovascular disease.      Electronically signed by Justen Vasquez MD, 11/16/21, 5:18 PM EST.

## 2021-11-23 RX ORDER — MECLIZINE HYDROCHLORIDE 25 MG/1
TABLET ORAL
Qty: 360 TABLET | Refills: 0 | Status: SHIPPED | OUTPATIENT
Start: 2021-11-23 | End: 2021-12-07

## 2021-11-24 RX ORDER — LISINOPRIL 40 MG/1
TABLET ORAL
Qty: 90 TABLET | Refills: 1 | Status: SHIPPED | OUTPATIENT
Start: 2021-11-24 | End: 2022-03-14 | Stop reason: SDUPTHER

## 2021-12-01 RX ORDER — ALLOPURINOL 300 MG/1
TABLET ORAL
Qty: 90 TABLET | Refills: 0 | Status: SHIPPED | OUTPATIENT
Start: 2021-12-01 | End: 2021-12-13

## 2021-12-07 ENCOUNTER — TELEPHONE (OUTPATIENT)
Dept: FAMILY MEDICINE CLINIC | Facility: CLINIC | Age: 76
End: 2021-12-07

## 2021-12-07 ENCOUNTER — OFFICE VISIT (OUTPATIENT)
Dept: FAMILY MEDICINE CLINIC | Facility: CLINIC | Age: 76
End: 2021-12-07

## 2021-12-07 VITALS
HEART RATE: 60 BPM | BODY MASS INDEX: 31.86 KG/M2 | WEIGHT: 235.2 LBS | DIASTOLIC BLOOD PRESSURE: 58 MMHG | SYSTOLIC BLOOD PRESSURE: 145 MMHG | OXYGEN SATURATION: 96 % | HEIGHT: 72 IN

## 2021-12-07 DIAGNOSIS — M54.41 CHRONIC LOW BACK PAIN WITH BILATERAL SCIATICA, UNSPECIFIED BACK PAIN LATERALITY: ICD-10-CM

## 2021-12-07 DIAGNOSIS — J43.8 OTHER EMPHYSEMA (HCC): ICD-10-CM

## 2021-12-07 DIAGNOSIS — G89.29 CHRONIC LOW BACK PAIN WITH BILATERAL SCIATICA, UNSPECIFIED BACK PAIN LATERALITY: ICD-10-CM

## 2021-12-07 DIAGNOSIS — J43.9 PULMONARY EMPHYSEMA, UNSPECIFIED EMPHYSEMA TYPE (HCC): Primary | ICD-10-CM

## 2021-12-07 DIAGNOSIS — E78.5 HYPERLIPIDEMIA, UNSPECIFIED HYPERLIPIDEMIA TYPE: ICD-10-CM

## 2021-12-07 DIAGNOSIS — M54.42 CHRONIC LOW BACK PAIN WITH BILATERAL SCIATICA, UNSPECIFIED BACK PAIN LATERALITY: ICD-10-CM

## 2021-12-07 DIAGNOSIS — F41.9 ANXIETY: ICD-10-CM

## 2021-12-07 DIAGNOSIS — I10 HYPERTENSION, UNSPECIFIED TYPE: ICD-10-CM

## 2021-12-07 DIAGNOSIS — I10 PRIMARY HYPERTENSION: ICD-10-CM

## 2021-12-07 DIAGNOSIS — N28.9 RENAL INSUFFICIENCY: ICD-10-CM

## 2021-12-07 DIAGNOSIS — C43.9 MALIGNANT MELANOMA, UNSPECIFIED SITE (HCC): ICD-10-CM

## 2021-12-07 DIAGNOSIS — D50.9 IRON DEFICIENCY ANEMIA, UNSPECIFIED IRON DEFICIENCY ANEMIA TYPE: ICD-10-CM

## 2021-12-07 PROBLEM — M79.609 LIMB PAIN: Status: ACTIVE | Noted: 2021-12-07

## 2021-12-07 PROCEDURE — 99214 OFFICE O/P EST MOD 30 MIN: CPT | Performed by: NURSE PRACTITIONER

## 2021-12-07 RX ORDER — BUSPIRONE HYDROCHLORIDE 10 MG/1
TABLET ORAL
Qty: 270 TABLET | Refills: 0 | Status: SHIPPED | OUTPATIENT
Start: 2021-12-07 | End: 2022-03-17 | Stop reason: SDUPTHER

## 2021-12-07 RX ORDER — PROPRANOLOL HYDROCHLORIDE 40 MG/1
TABLET ORAL
Qty: 270 TABLET | Refills: 1 | Status: SHIPPED | OUTPATIENT
Start: 2021-12-07 | End: 2022-04-14 | Stop reason: SDUPTHER

## 2021-12-07 RX ORDER — AMOXICILLIN 500 MG/1
TABLET, FILM COATED ORAL
COMMUNITY
Start: 2021-10-20 | End: 2022-04-22

## 2021-12-07 RX ORDER — DIPHENOXYLATE HYDROCHLORIDE AND ATROPINE SULFATE 2.5; .025 MG/1; MG/1
1 TABLET ORAL DAILY
Status: ON HOLD | COMMUNITY
End: 2022-04-27

## 2021-12-07 RX ORDER — MECLIZINE HYDROCHLORIDE 25 MG/1
TABLET ORAL
Qty: 360 TABLET | Refills: 0 | Status: SHIPPED | OUTPATIENT
Start: 2021-12-07 | End: 2022-05-16

## 2021-12-07 NOTE — PROGRESS NOTES
"Chief Complaint  Essential hypertension (4 month f/u)    Subjective          Asaf Chilel presents to [facility]  History of Present Illness     Renal insuff-[facility] 1.42 and and gfr wa 49-he is scheduled to see  next month due to the kidney function.     Reviewed [facility]-CBC, CMP, TSH, a1c, vit b12, folate, iron-all WNL with exception of above renal insuff.    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 He had 3 wks ago and they were unable to remove it all.     HTN-reports his b/p has been running high -states it has been running sround 145/50. He kept a log for a couple wks and gave it to cardiology and no changes were made.     Hyperlipidemia-reviewed med list pt brought from home-his list states 40mg and our list states 80mg he is going to check with his wife and call us back.    States he called in his needed refills.    Iron def anemia-states he is no longer taking the iron-his iron level was normal when checked.    Emphysema-reports Trelegy works well for him. Samples given today.    Back pain-reports his back pain only at night-states he notices it when laying in bed. Admits if he stands on his legs for long periods his legs below his knees will go to sleep. Discussed imaging of his back but he defers.     He  has a past medical history of Anxiety, Cardiomyopathy (HCC), Gastroesophageal reflux, Gout, High cholesterol, Hip pain, Hoarseness, chronic, Hypertension, Hypothyroid, AJ (iron deficiency anemia) (05/07/2015), Limb pain, Lumbago (09/03/2013), Peripheral vascular disease (HCC) (03/28/2015), Sciatica (09/03/2013), and Tremor.     Objective   Vital Signs:   /58 (BP Location: Right arm, Patient Position: Sitting, Cuff Size: Large Adult)   Pulse 60   Ht 182.9 cm (72\")   Wt 107 kg (235 lb 3.2 oz)   SpO2 96%   BMI 31.90 kg/m²     Physical Exam  Constitutional:       Appearance: Normal " appearance.   Neck:      Thyroid: No thyroid mass, thyromegaly or thyroid tenderness.      Vascular: No carotid bruit.   Cardiovascular:      Rate and Rhythm: Normal rate and regular rhythm.      Pulses: Normal pulses.      Heart sounds: Normal heart sounds.   Pulmonary:      Effort: Pulmonary effort is normal.      Breath sounds: Normal breath sounds.   Musculoskeletal:      Right lower leg: No edema.      Left lower leg: No edema.   Skin:     General: Skin is warm and dry.   Neurological:      General: No focal deficit present.      Mental Status: He is alert and oriented to person, place, and time.   Psychiatric:         Behavior: Behavior normal.        Result Review :            Past Surgical History:   Procedure Laterality Date   • COLONOSCOPY  2014    normal Dr. Bingham   • SHOULDER SURGERY        Family History   Problem Relation Age of Onset   • Cancer Other    • Heart failure Other    • Other Other         spine problems         Current Outpatient Medications:   •  albuterol sulfate  (90 Base) MCG/ACT inhaler, INHALE 1 TO 2 PUFFS BY MOUTH EVERY 6 HOURS AS NEEDED, Disp: 1 g, Rfl: 5  •  allopurinol (ZYLOPRIM) 300 MG tablet, TAKE 1 TABLET BY MOUTH EVERY DAY, Disp: 90 tablet, Rfl: 0  •  amitriptyline (ELAVIL) 25 MG tablet, Take 25 mg by mouth every night at bedtime., Disp: , Rfl:   •  amoxicillin (AMOXIL) 500 MG tablet, , Disp: , Rfl:   •  aspirin 81 MG EC tablet, Take 81 mg by mouth Daily., Disp: , Rfl:   •  atorvastatin (LIPITOR) 80 MG tablet, Take 1 tablet by mouth Daily., Disp: 90 tablet, Rfl: 3  •  busPIRone (BUSPAR) 10 MG tablet, Take 1 tablet by mouth 3 (Three) Times a Day., Disp: 270 tablet, Rfl: 0  •  Fluticasone-Umeclidin-Vilant (Trelegy Ellipta) 100-62.5-25 MCG/INH inhaler, Inhale 1 puff Daily. Indications: lot (51) 0T2F exp 4/23 2 boxes, Disp: 2 each, Rfl: 0  •  ketoconazole (NIZORAL) 2 % cream, APPLY BY TOPICALLY 2TIMES EVERY DAY DURING FLARE UPS, THEN 2TIMES PER WEEK FOR MAINTENANCE  TREATMENT, Disp: , Rfl:   •  ketoconazole (NIZORAL) 2 % shampoo, WASH EVERY OTHER DAY, ALTERNATE WITH OTC SHAMPOO OF YOUR CHOOSING, Disp: , Rfl:   •  lisinopril (PRINIVIL,ZESTRIL) 40 MG tablet, TAKE 1 TABLET BY MOUTH EVERY DAY, Disp: 90 tablet, Rfl: 1  •  meclizine (ANTIVERT) 25 MG tablet, TAKE 1 TABLET BY MOUTH FOUR TIMES A DAY, Disp: 360 tablet, Rfl: 0  •  multivitamin (multivitamin) tablet tablet, Take 1 tablet by mouth., Disp: , Rfl:   •  multivitamin with minerals (MULTIVITAMIN ADULT PO), Take 1 tablet by mouth Daily., Disp: , Rfl:   •  omeprazole (priLOSEC) 20 MG capsule, TAKE 2 CAPSULES BY MOUTH EVERY DAY BEFORE MEALS, Disp: 180 capsule, Rfl: 2  •  propranolol (INDERAL) 40 MG tablet, TAKE 1 TABLET BY MOUTH 3 TIMES PER DAY, Disp: 270 tablet, Rfl: 1  •  spironolactone (ALDACTONE) 25 MG tablet, TAKE 1 TABLET BY MOUTH EVERY OTHER DAY, OR AS DIRECTED, Disp: 45 tablet, Rfl: 2  •  Synthroid 88 MCG tablet, TAKE ONE TABLET BY MOUTH MON SAT. ON SUNDAY TAKE 1/2 TABLET, Disp: , Rfl:   •  Vascepa 1 g capsule capsule, TAKE 2 CAPSULES BY MOUTH 2 TIMES A DAY WITH FOOD (DO NOT CHEW OR OPEN), Disp: 360 capsule, Rfl: 3   Assessment and Plan    Diagnoses and all orders for this visit:    1. Pulmonary emphysema, unspecified emphysema type (HCC) (Primary)  -     Fluticasone-Umeclidin-Vilant (Trelegy Ellipta) 100-62.5-25 MCG/INH inhaler; Inhale 1 puff Daily. Indications: lot (10) 8T2F exp 4/23 2 boxes  Dispense: 2 each; Refill: 0    2. Renal insufficiency  Comments:  cr was 1.42 and and gfr wa 49-he is scheduled to see  next month due to the kidney function.     3. Malignant melanoma, unspecified site (HCC)  Comments:  scheduled for repeat surgery on right eye to remove remaining melanoma    4. Primary hypertension  Comments:  reports his b/p has been running high -states it has been running sround 145/50. He kept a log for a couple wks and gave it to cardiology and no changes were ma    5. Hyperlipidemia, unspecified  hyperlipidemia type  Comments:  reviewed med list pt brought from home-his list states 40mg and our list states 80mg he is going to check with his wife and call us back    6. Iron deficiency anemia, unspecified iron deficiency anemia type  Comments:  Iron def anemia-states he is no longer taking the iron-his iron level was normal when checked.    7. Other emphysema (HCC)  Comments:  Emphysema stable-reports Trelegy works well for him. Samples given today.    8. Chronic low back pain with bilateral sciatica, unspecified back pain laterality  Comments:  c/o back pain w/radiculopathy in b/l lower extremities. Deferes changes to the bowel and bladder. Defers imaging.        Follow Up   Return in about 4 months (around 4/7/2022).  Patient was given instructions and counseling regarding his condition or for health maintenance advice. Please see specific information pulled into the AVS if appropriate.     Asaf Gilmore Ranjana  reports that he quit smoking about 21 years ago. His smoking use included cigarettes. He started smoking about 65 years ago. He has a 44.00 pack-year smoking history. He has never used smokeless tobacco..            YASH Castro

## 2021-12-07 NOTE — TELEPHONE ENCOUNTER
Caller: Asaf Chilel    Relationship: Self    Best call back number:[phone]    What is the best time to reach you:ANYTIME    Who are you requesting to speak with (clinical staff, provider,  specific staff member)CLINICAL    Do you know the name of the person who called:N/A    What was the call regarding:PATIENT CALLED IN TO SAY HE WAS SEEN TODAY AND KAYLAN WAS CORRECT ON THE ATORVASTIN IT WAS 80MG.      Do you require a callback:YES

## 2021-12-12 DIAGNOSIS — M10.9 GOUT, UNSPECIFIED CAUSE, UNSPECIFIED CHRONICITY, UNSPECIFIED SITE: Primary | ICD-10-CM

## 2021-12-13 ENCOUNTER — TELEPHONE (OUTPATIENT)
Dept: FAMILY MEDICINE CLINIC | Facility: CLINIC | Age: 76
End: 2021-12-13

## 2021-12-13 DIAGNOSIS — J06.9 UPPER RESPIRATORY TRACT INFECTION, UNSPECIFIED TYPE: Primary | ICD-10-CM

## 2021-12-13 RX ORDER — AZITHROMYCIN 250 MG/1
TABLET, FILM COATED ORAL
Qty: 6 TABLET | Refills: 0 | Status: SHIPPED | OUTPATIENT
Start: 2021-12-13 | End: 2022-04-22

## 2021-12-13 RX ORDER — ALLOPURINOL 300 MG/1
TABLET ORAL
Qty: 90 TABLET | Refills: 0 | Status: SHIPPED | OUTPATIENT
Start: 2021-12-13 | End: 2022-03-17 | Stop reason: SDUPTHER

## 2021-12-13 NOTE — TELEPHONE ENCOUNTER
Caller: Asaf Chilel    Relationship: Self    Best call back number: [phone]     What medication are you requesting:     COLD AND SINUS     What are your current symptoms:     COUGHING AND CONGESTED   How long have you been experiencing symptoms:  12/10/21    Have you had these symptoms before:    [x] Yes  [] No    Have you been treated for these symptoms before:   [x] Yes  [] No    If a prescription is needed, what is your preferred pharmacy and phone number:      [facility]/pharmacy #[id number] - [place] - [address] - [phone] [facility] [phone] FX         Additional notes:    PATIENT STATED THAT HE HAS BEEN HAVING COLD AND SINUS SYMPTOMS AND WOULD LIKE TO KNOW IF PCP WOULD CALL HIM IN SOME MEDICATIONS TO THE PHARMACY.  HE SAID HE IS COUGHING AND IS CONGESTION.   PLEASE CALL AND ADVISE IF THIS CAN  BE DONE

## 2022-01-12 ENCOUNTER — LAB (OUTPATIENT)
Dept: LAB | Facility: HOSPITAL | Age: 77
End: 2022-01-12

## 2022-01-12 DIAGNOSIS — E78.00 HIGH CHOLESTEROL: Chronic | ICD-10-CM

## 2022-01-12 DIAGNOSIS — I10 ESSENTIAL HYPERTENSION: Chronic | ICD-10-CM

## 2022-01-12 LAB
ALBUMIN SERPL-MCNC: 4.7 G/DL (ref 3.5–5.2)
ALBUMIN/GLOB SERPL: 1.9 G/DL
ALP SERPL-CCNC: 80 U/L (ref 39–117)
ALT SERPL W P-5'-P-CCNC: 14 U/L (ref 1–41)
ANION GAP SERPL CALCULATED.3IONS-SCNC: 10 MMOL/L (ref 5–15)
AST SERPL-CCNC: 17 U/L (ref 1–40)
BILIRUB SERPL-MCNC: 0.6 MG/DL (ref 0–1.2)
BUN SERPL-MCNC: 20 MG/DL (ref 8–23)
BUN/CREAT SERPL: 16.3 (ref 7–25)
CALCIUM SPEC-SCNC: 9.7 MG/DL (ref 8.6–10.5)
CHLORIDE SERPL-SCNC: 102 MMOL/L (ref 98–107)
CHOLEST SERPL-MCNC: 158 MG/DL (ref 0–200)
CO2 SERPL-SCNC: 28 MMOL/L (ref 22–29)
CREAT SERPL-MCNC: 1.23 MG/DL (ref 0.76–1.27)
GFR SERPL CREATININE-BSD FRML MDRD: 57 ML/MIN/1.73
GLOBULIN UR ELPH-MCNC: 2.5 GM/DL
GLUCOSE SERPL-MCNC: 101 MG/DL (ref 65–99)
HDLC SERPL-MCNC: 38 MG/DL (ref 40–60)
LDLC SERPL CALC-MCNC: 72 MG/DL (ref 0–100)
LDLC/HDLC SERPL: 1.61 {RATIO}
POTASSIUM SERPL-SCNC: 4.3 MMOL/L (ref 3.5–5.2)
PROT SERPL-MCNC: 7.2 G/DL (ref 6–8.5)
SODIUM SERPL-SCNC: 140 MMOL/L (ref 136–145)
TRIGL SERPL-MCNC: 295 MG/DL (ref 0–150)
VLDLC SERPL-MCNC: 48 MG/DL (ref 5–40)

## 2022-01-12 PROCEDURE — 36415 COLL VENOUS BLD VENIPUNCTURE: CPT

## 2022-01-12 PROCEDURE — 80061 LIPID PANEL: CPT

## 2022-01-12 PROCEDURE — 80053 COMPREHEN METABOLIC PANEL: CPT

## 2022-01-17 ENCOUNTER — TELEPHONE (OUTPATIENT)
Dept: CARDIOLOGY | Facility: CLINIC | Age: 77
End: 2022-01-17

## 2022-01-17 NOTE — TELEPHONE ENCOUNTER
----- Message from YASH Holt sent at 1/13/2022  7:41 AM EST -----  Cholesterols mixed with triglycerides worse.  Encourage decrease carbs and sweets to help.  Continue atorvastatin and Vascepa

## 2022-02-21 DIAGNOSIS — F41.9 ANXIETY: ICD-10-CM

## 2022-02-21 RX ORDER — BUSPIRONE HYDROCHLORIDE 10 MG/1
10 TABLET ORAL 3 TIMES DAILY
Qty: 270 TABLET | Refills: 0 | OUTPATIENT
Start: 2022-02-21

## 2022-02-21 RX ORDER — SPIRONOLACTONE 25 MG/1
TABLET ORAL
Qty: 45 TABLET | Refills: 3 | Status: SHIPPED | OUTPATIENT
Start: 2022-02-21 | End: 2023-02-08 | Stop reason: SDUPTHER

## 2022-02-28 ENCOUNTER — PREP FOR SURGERY (OUTPATIENT)
Dept: OTHER | Facility: HOSPITAL | Age: 77
End: 2022-02-28

## 2022-02-28 ENCOUNTER — OFFICE VISIT (OUTPATIENT)
Dept: SURGERY | Facility: CLINIC | Age: 77
End: 2022-02-28

## 2022-02-28 VITALS — BODY MASS INDEX: 31.42 KG/M2 | HEART RATE: 70 BPM | RESPIRATION RATE: 18 BRPM | HEIGHT: 72 IN | WEIGHT: 232 LBS

## 2022-02-28 DIAGNOSIS — Z12.11 SCREENING FOR MALIGNANT NEOPLASM OF COLON: ICD-10-CM

## 2022-02-28 DIAGNOSIS — K22.70 BARRETT'S ESOPHAGUS: ICD-10-CM

## 2022-02-28 DIAGNOSIS — K21.9 GERD WITHOUT ESOPHAGITIS: Primary | ICD-10-CM

## 2022-02-28 DIAGNOSIS — K21.9 GASTROESOPHAGEAL REFLUX DISEASE WITHOUT ESOPHAGITIS: Primary | ICD-10-CM

## 2022-02-28 DIAGNOSIS — Z87.19 HISTORY OF BARRETT'S ESOPHAGUS: ICD-10-CM

## 2022-02-28 PROCEDURE — 99213 OFFICE O/P EST LOW 20 MIN: CPT | Performed by: NURSE PRACTITIONER

## 2022-02-28 RX ORDER — SODIUM CHLORIDE 0.9 % (FLUSH) 0.9 %
3 SYRINGE (ML) INJECTION EVERY 12 HOURS SCHEDULED
Status: CANCELLED | OUTPATIENT
Start: 2022-02-28

## 2022-02-28 RX ORDER — SODIUM CHLORIDE 0.9 % (FLUSH) 0.9 %
10 SYRINGE (ML) INJECTION AS NEEDED
Status: CANCELLED | OUTPATIENT
Start: 2022-02-28

## 2022-02-28 NOTE — PROGRESS NOTES
Chief Complaint: Colonoscopy (consult)    Subjective      EGD and colonoscopy consultation         History of Present Illness  Asaf Chilel is a 76 y.o. male presents to [facility] for EGD and colonoscopy consultation.    Patient presents today on referral from Dr. Charles Bingham.    Patient presents today with complaints of GERD despite taking omeprazole daily.    Denies any dysphagia or epigastric pain.    Has a history of Tapia's esophagus.    Denies any lower abdominal pain, change in bowel habit, rectal bleeding.    Denies any family history of colorectal cancer.    1/14: Colonoscopy ([facility]): Normal colon    3/10: EGD & Colonoscopy ([facility]): GE junction-Tapia's esophagus; stomach-fundic gland polyp; hiatal hernia; normal colon.      Objective     Past Medical History:   Diagnosis Date   • Anxiety    • Cardiomyopathy (HCC)    • Gastroesophageal reflux    • Gout    • High cholesterol    • Hip pain    • Hoarseness, chronic    • Hypertension    • Hypothyroid    • AJ (iron deficiency anemia) 05/07/2015   • Limb pain    • Lumbago 09/03/2013    MRI shows abnormal appearance of bone marrow. Workup negative pet hem/onc   • Peripheral vascular disease (HCC) 03/28/2015   • Sciatica 09/03/2013   • Tremor        Past Surgical History:   Procedure Laterality Date   • COLONOSCOPY  2014    normal Dr. Bingham   • SHOULDER SURGERY         Outpatient Medications Marked as Taking for the 2/28/22 encounter (Office Visit) with Mario April, APRN   Medication Sig Dispense Refill   • albuterol sulfate  (90 Base) MCG/ACT inhaler INHALE 1 TO 2 PUFFS BY MOUTH EVERY 6 HOURS AS NEEDED 1 g 5   • allopurinol (ZYLOPRIM) 300 MG tablet TAKE 1 TABLET BY MOUTH EVERY DAY 90 tablet 0   • amitriptyline (ELAVIL) 25 MG tablet Take 25 mg by mouth every night at bedtime.     • aspirin 81 MG EC tablet Take 81 mg by mouth Daily.     • atorvastatin (LIPITOR) 80 MG tablet Take 1 tablet by mouth Daily. 90  tablet 3   • busPIRone (BUSPAR) 10 MG tablet TAKE 1 TABLET BY MOUTH THREE TIMES A  tablet 0   • ketoconazole (NIZORAL) 2 % cream APPLY BY TOPICALLY 2TIMES EVERY DAY DURING FLARE UPS, THEN 2TIMES PER WEEK FOR MAINTENANCE TREATMENT     • ketoconazole (NIZORAL) 2 % shampoo WASH EVERY OTHER DAY, ALTERNATE WITH OTC SHAMPOO OF YOUR CHOOSING     • lisinopril (PRINIVIL,ZESTRIL) 40 MG tablet TAKE 1 TABLET BY MOUTH EVERY DAY 90 tablet 1   • meclizine (ANTIVERT) 25 MG tablet TAKE 1 TABLET BY MOUTH FOUR TIMES A  tablet 0   • multivitamin (multivitamin) tablet tablet Take 1 tablet by mouth.     • omeprazole (priLOSEC) 20 MG capsule TAKE 2 CAPSULES BY MOUTH EVERY DAY BEFORE MEALS 180 capsule 2   • propranolol (INDERAL) 40 MG tablet TAKE 1 TABLET BY MOUTH 3 TIMES PER  tablet 1   • spironolactone (ALDACTONE) 25 MG tablet TAKE 1 TABLET BY MOUTH EVERY OTHER DAY, OR AS DIRECTED 45 tablet 3   • Synthroid 88 MCG tablet TAKE ONE TABLET BY MOUTH MON SAT. ON  TAKE 1/2 TABLET     • Vascepa 1 g capsule capsule TAKE 2 CAPSULES BY MOUTH 2 TIMES A DAY WITH FOOD (DO NOT CHEW OR OPEN) 360 capsule 3       No Known Allergies     Family History   Problem Relation Age of Onset   • Cancer Other    • Heart failure Other    • Other Other         spine problems        Social History     Socioeconomic History   • Marital status:    Tobacco Use   • Smoking status: Former Smoker     Packs/day: 1.00     Years: 44.00     Pack years: 44.00     Types: Cigarettes     Start date:      Quit date: 2000     Years since quittin.1   • Smokeless tobacco: Never Used   Vaping Use   • Vaping Use: Never used   Substance and Sexual Activity   • Alcohol use: Not Currently   • Drug use: Never   • Sexual activity: Defer       Review of Systems   Constitutional: Negative for chills and fever.   Gastrointestinal: Positive for GERD and indigestion. Negative for abdominal distention, abdominal pain, anal bleeding, blood in stool,  "constipation, diarrhea, nausea, rectal pain and vomiting.        Vital Signs:   Pulse 70   Resp 18   Ht 182.9 cm (72\")   Wt 105 kg (232 lb)   BMI 31.46 kg/m²      Physical Exam  Constitutional:       Appearance: Normal appearance.   HENT:      Head: Normocephalic.   Cardiovascular:      Rate and Rhythm: Normal rate.   Pulmonary:      Effort: Pulmonary effort is normal.   Abdominal:      Palpations: Abdomen is soft.   Skin:     General: Skin is warm and dry.   Neurological:      General: No focal deficit present.      Mental Status: He is alert and oriented to person, place, and time.   Psychiatric:         Mood and Affect: Mood normal.         Thought Content: Thought content normal.          Result Review :          []  Laboratory  []  Radiology  [x]  Pathology  []  Microbiology  []  EKG/Telemetry   []  Cardiology/Vascular   [x]  Old records  Today I reviewed Dr. Bingham's previous colonoscopies and pathology report.     Assessment and Plan    Diagnoses and all orders for this visit:    1. Gastroesophageal reflux disease without esophagitis (Primary)    2. History of Tapia's esophagus    3. Screening for malignant neoplasm of colon    white prep    Follow Up   Return for Scheduled EGD and colonoscopy with Dr. Seo on 4/27/2022 at [facility].     Hospital arrival time 7:30 AM    Possible risks/complications, benefits, and alternatives to surgical or invasive procedure have been explained to patient and/or legal guardian.     Patient has been evaluated and can tolerate anesthesia and/or sedation. Risks, benefits, and alternatives to anesthesia and sedation have been explained to patient and/or legal guardian.  Patient verbalizes understanding and is willing to proceed with the above plan.    Patient was given instructions and counseling regarding his condition or for health maintenance advice. Please see specific information pulled into the AVS if appropriate.           "

## 2022-03-14 RX ORDER — AMITRIPTYLINE HYDROCHLORIDE 25 MG/1
25 TABLET, FILM COATED ORAL
Qty: 90 TABLET | Refills: 0 | Status: SHIPPED | OUTPATIENT
Start: 2022-03-14 | End: 2022-04-14 | Stop reason: SDUPTHER

## 2022-03-14 RX ORDER — LISINOPRIL 40 MG/1
40 TABLET ORAL DAILY
Qty: 90 TABLET | Refills: 0 | Status: SHIPPED | OUTPATIENT
Start: 2022-03-14 | End: 2022-04-14 | Stop reason: SDUPTHER

## 2022-03-17 DIAGNOSIS — E78.00 HIGH CHOLESTEROL: Chronic | ICD-10-CM

## 2022-03-17 DIAGNOSIS — M10.9 GOUT, UNSPECIFIED CAUSE, UNSPECIFIED CHRONICITY, UNSPECIFIED SITE: ICD-10-CM

## 2022-03-17 DIAGNOSIS — F41.9 ANXIETY: ICD-10-CM

## 2022-03-17 RX ORDER — ATORVASTATIN CALCIUM 80 MG/1
80 TABLET, FILM COATED ORAL DAILY
Qty: 90 TABLET | Refills: 1 | Status: SHIPPED | OUTPATIENT
Start: 2022-03-17 | End: 2022-11-21

## 2022-03-17 RX ORDER — BUSPIRONE HYDROCHLORIDE 10 MG/1
10 TABLET ORAL 3 TIMES DAILY
Qty: 270 TABLET | Refills: 1 | Status: SHIPPED | OUTPATIENT
Start: 2022-03-17 | End: 2022-07-27

## 2022-03-17 RX ORDER — ALLOPURINOL 300 MG/1
300 TABLET ORAL DAILY
Qty: 90 TABLET | Refills: 1 | Status: SHIPPED | OUTPATIENT
Start: 2022-03-17 | End: 2022-11-21

## 2022-03-17 NOTE — TELEPHONE ENCOUNTER
Incoming Refill Request      Medication requested (name and dose):   busPIRone (BUSPAR) 10 MG tablet    atorvastatin (LIPITOR) 80 MG tablet     allopurinol (ZYLOPRIM) 300 MG tablet      Pharmacy where request should be sent:   [facility]    Additional details provided by patient:    Best call back number:   [phone]    Does the patient have less than a 3 day supply:  [] Yes  [x] No    Iraida Pollack Rep  03/17/22, 14:53 EDT

## 2022-04-04 DIAGNOSIS — K21.9 GASTROESOPHAGEAL REFLUX DISEASE, UNSPECIFIED WHETHER ESOPHAGITIS PRESENT: Primary | ICD-10-CM

## 2022-04-04 RX ORDER — OMEPRAZOLE 20 MG/1
40 CAPSULE, DELAYED RELEASE ORAL DAILY
Qty: 180 CAPSULE | Refills: 1 | Status: SHIPPED | OUTPATIENT
Start: 2022-04-04 | End: 2022-09-06

## 2022-04-14 ENCOUNTER — OFFICE VISIT (OUTPATIENT)
Dept: FAMILY MEDICINE CLINIC | Facility: CLINIC | Age: 77
End: 2022-04-14

## 2022-04-14 VITALS
BODY MASS INDEX: 30.64 KG/M2 | WEIGHT: 226.2 LBS | OXYGEN SATURATION: 100 % | SYSTOLIC BLOOD PRESSURE: 153 MMHG | HEIGHT: 72 IN | DIASTOLIC BLOOD PRESSURE: 63 MMHG | HEART RATE: 57 BPM

## 2022-04-14 DIAGNOSIS — I10 HYPERTENSION, UNSPECIFIED TYPE: ICD-10-CM

## 2022-04-14 DIAGNOSIS — Z01.89 ROUTINE LAB DRAW: ICD-10-CM

## 2022-04-14 DIAGNOSIS — Z12.5 SCREENING FOR PROSTATE CANCER: ICD-10-CM

## 2022-04-14 DIAGNOSIS — E66.9 OBESITY (BMI 30-39.9): ICD-10-CM

## 2022-04-14 DIAGNOSIS — E03.9 HYPOTHYROIDISM, UNSPECIFIED TYPE: ICD-10-CM

## 2022-04-14 DIAGNOSIS — J43.9 PULMONARY EMPHYSEMA, UNSPECIFIED EMPHYSEMA TYPE: ICD-10-CM

## 2022-04-14 DIAGNOSIS — F41.9 ANXIETY: Primary | ICD-10-CM

## 2022-04-14 DIAGNOSIS — E78.5 HYPERLIPIDEMIA, UNSPECIFIED HYPERLIPIDEMIA TYPE: ICD-10-CM

## 2022-04-14 DIAGNOSIS — N28.9 RENAL INSUFFICIENCY: ICD-10-CM

## 2022-04-14 PROBLEM — R80.9 PROTEINURIA: Status: ACTIVE | Noted: 2022-03-04

## 2022-04-14 PROCEDURE — 99214 OFFICE O/P EST MOD 30 MIN: CPT | Performed by: NURSE PRACTITIONER

## 2022-04-14 RX ORDER — PROPRANOLOL HYDROCHLORIDE 40 MG/1
40 TABLET ORAL 3 TIMES DAILY
Qty: 270 TABLET | Refills: 1 | Status: SHIPPED | OUTPATIENT
Start: 2022-04-14 | End: 2022-11-04

## 2022-04-14 RX ORDER — AMITRIPTYLINE HYDROCHLORIDE 25 MG/1
25 TABLET, FILM COATED ORAL
Qty: 90 TABLET | Refills: 1 | Status: SHIPPED | OUTPATIENT
Start: 2022-04-14 | End: 2023-01-09

## 2022-04-14 RX ORDER — LISINOPRIL 40 MG/1
40 TABLET ORAL DAILY
Qty: 90 TABLET | Refills: 1 | Status: SHIPPED | OUTPATIENT
Start: 2022-04-14 | End: 2023-02-06

## 2022-04-14 NOTE — PROGRESS NOTES
"Chief Complaint  pulmonary emphysema (4 month follow up)    Subjective          Asaf Chilel presents to [facility]  History of Present Illness     States  is going to release his care back to me for his thyroid since he is stable on his medication.     Emphysema-[facility]orts [facility] from Trelegy to Wixela-he cannot tell if it is working     Hyperlipidemia-recently saw Malaikachauncey Florentino cardiology done at that time which showed his Cholesterols mixed with triglycerides worse.  Encouraged pt to decrease carbs and sugars.  Continue atorvastatin and Vascepa    Renal insuff-last visit he was going to have an appt with nephrology-he is unsure if he went to that appt. Ordered repeat CMP.     He is winter for egd/colonscopy at the end of the month. Hx of polyps. Hemorrhoids, diverticulitis. He is having reflux despite taking his omeprazole. Hx of Barretts esophagus. Admits he does get hoarse at times.     He  has a past medical history of Anxiety, Cancer (HCC), Cardiomyopathy (HCC), Gastroesophageal reflux, Gout, High cholesterol, Hip pain, Hoarseness, chronic, Hypertension, Hypothyroid, AJ (iron deficiency anemia) (05/07/2015), Limb pain, Lumbago (09/03/2013), Peripheral vascular disease (HCC) (03/28/2015), Sciatica (09/03/2013), and Tremor.    He has no past medical history of Hard to intubate, Malignant hyperthermia due to anesthesia, PONV (postoperative nausea and vomiting), or Spinal headache.     Objective   Vital Signs:   /63   Pulse 57   Ht 182.9 cm (72\")   Wt 103 kg (226 lb 3.2 oz)   SpO2 100%   BMI 30.68 kg/m²     Physical Exam  Constitutional:       Appearance: Normal appearance. He is obese.   Neck:      Thyroid: No thyroid mass, thyromegaly or thyroid tenderness.      Vascular: No carotid bruit.   Cardiovascular:      Rate and Rhythm: Normal rate and regular rhythm.      Pulses: Normal pulses.      Heart sounds: Normal heart sounds.   Pulmonary: "      Effort: Pulmonary effort is normal.      Breath sounds: Normal breath sounds.   Musculoskeletal:      Right lower leg: No edema.      Left lower leg: No edema.   Skin:     General: Skin is warm and dry.   Neurological:      General: No focal deficit present.      Mental Status: He is alert and oriented to person, place, and time.   Psychiatric:         Mood and Affect: Mood normal.         Behavior: Behavior normal.        Result Review :   The following data was reviewed by: YASH Castro on 04/14/2022:  CMP    CMP 9/15/21 1/12/22 4/20/22   Glucose 93 101 (A) 102 (A)   BUN 21 20 22   Creatinine 1.19 1.23 1.24   eGFR Non  Am 60 (A) 57 (A)    Sodium 137 140 139   Potassium 4.6 4.3 4.4   Chloride 103 102 104   Calcium 9.5 9.7 9.2   Albumin 4.50 4.70 4.30   Total Bilirubin 0.4 0.6 0.4   Alkaline Phosphatase 64 80 67   AST (SGOT) 18 17 15   ALT (SGPT) 16 14 13   (A) Abnormal value       Comments are available for some flowsheets but are not being displayed.                 Lipid Panel    Lipid Panel 9/15/21 1/12/22 4/20/22   Total Cholesterol 150 158 134   Triglycerides 233 (A) 295 (A) 188 (A)   HDL Cholesterol 37 (A) 38 (A) 37 (A)   VLDL Cholesterol 38 48 (A) 31   LDL Cholesterol  75 72 66   LDL/HDL Ratio 1.79 1.61 1.61   (A) Abnormal value            TSH    TSH 4/20/22   TSH 1.910           Most Recent A1C    HGBA1C Most Recent 4/26/22   Hemoglobin A1C 5.70 (A)   (A) Abnormal value                PSA    PSA 4/20/22   PSA 0.706                   Past Surgical History:   Procedure Laterality Date   • COLONOSCOPY  2014    normal Dr. Bingham   • COLONOSCOPY N/A 4/27/2022    Procedure: COLONOSCOPY WITH POLYPECTOMY;  Surgeon: Juarez Seo MD;  Location: [facility] ENDOSCOPY;  Service: General;  Laterality: N/A;  DIVERTICULOSIS, COLON POLYP   • ENDOSCOPY N/A 4/27/2022    Procedure: ESOPHAGOGASTRODUODENOSCOPY WITH BIOPSIES AND POLYPECTOMY;  Surgeon: Juarez Seo MD;  Location: [facility] ENDOSCOPY;   Service: General;  Laterality: N/A;  GASTRIC POLYPS   • EYE SURGERY Right     cancer removed x 2   • MANDIBLE SURGERY      cancer removed   • SHOULDER SURGERY Right       Family History   Problem Relation Age of Onset   • Cancer Mother    • Cancer Father    • Cancer Brother    • Cancer Other    • Heart failure Other    • Other Other         spine problems    • Malig Hyperthermia Neg Hx         Current Outpatient Medications:   •  albuterol sulfate  (90 Base) MCG/ACT inhaler, INHALE 1 TO 2 PUFFS BY MOUTH EVERY 6 HOURS AS NEEDED (Patient taking differently: Inhale 1 puff Every 4 (Four) Hours As Needed.), Disp: 1 g, Rfl: 5  •  amitriptyline (ELAVIL) 25 MG tablet, Take 1 tablet by mouth every night at bedtime., Disp: 90 tablet, Rfl: 1  •  aspirin 81 MG EC tablet, Take 81 mg by mouth Daily. Last dose Friday 4/22/22, Disp: , Rfl:   •  atorvastatin (LIPITOR) 80 MG tablet, Take 1 tablet by mouth Daily., Disp: 90 tablet, Rfl: 1  •  busPIRone (BUSPAR) 10 MG tablet, Take 1 tablet by mouth 3 (Three) Times a Day., Disp: 270 tablet, Rfl: 1  •  ketoconazole (NIZORAL) 2 % cream, Apply 1 application topically to the appropriate area as directed Daily., Disp: , Rfl:   •  ketoconazole (NIZORAL) 2 % shampoo, Apply 1 application topically to the appropriate area as directed 1 (One) Time Per Week., Disp: , Rfl:   •  lisinopril (PRINIVIL,ZESTRIL) 40 MG tablet, Take 1 tablet by mouth Daily., Disp: 90 tablet, Rfl: 1  •  meclizine (ANTIVERT) 25 MG tablet, TAKE 1 TABLET BY MOUTH FOUR TIMES A DAY (Patient taking differently: Take 25 mg by mouth 3 (Three) Times a Day As Needed.), Disp: 360 tablet, Rfl: 0  •  multivitamin with minerals tablet tablet, Take 1 tablet by mouth Daily., Disp: , Rfl:   •  omeprazole (priLOSEC) 20 MG capsule, Take 2 capsules by mouth Daily. (Patient taking differently: Take 40 mg by mouth 2 (Two) Times a Day.), Disp: 180 capsule, Rfl: 1  •  propranolol (INDERAL) 40 MG tablet, Take 1 tablet by mouth 3 (Three)  Times a Day., Disp: 270 tablet, Rfl: 1  •  spironolactone (ALDACTONE) 25 MG tablet, TAKE 1 TABLET BY MOUTH EVERY OTHER DAY, OR AS DIRECTED (Patient taking differently: Take 25 mg by mouth Daily.), Disp: 45 tablet, Rfl: 3  •  Synthroid 88 MCG tablet, Take 88 mcg by mouth., Disp: , Rfl:   •  Vascepa 1 g capsule capsule, TAKE 2 CAPSULES BY MOUTH 2 TIMES A DAY WITH FOOD (DO NOT CHEW OR OPEN) (Patient taking differently: Take 2 capsules by mouth 2 (Two) Times a Day With Meals.), Disp: 360 capsule, Rfl: 3  •  allopurinol (ZYLOPRIM) 300 MG tablet, Take 1 tablet by mouth Daily. (Patient taking differently: Take 300 mg by mouth Daily With Lunch.), Disp: 90 tablet, Rfl: 1   Assessment and Plan    Diagnoses and all orders for this visit:    1. Anxiety (Primary)  Comments:  well controlled on current dose of elavil-continue  Orders:  -     amitriptyline (ELAVIL) 25 MG tablet; Take 1 tablet by mouth every night at bedtime.  Dispense: 90 tablet; Refill: 1    2. Hypertension, unspecified type  Comments:  elevated in the office but stable on lisinopril and inderal  Orders:  -     propranolol (INDERAL) 40 MG tablet; Take 1 tablet by mouth 3 (Three) Times a Day.  Dispense: 270 tablet; Refill: 1  -     lisinopril (PRINIVIL,ZESTRIL) 40 MG tablet; Take 1 tablet by mouth Daily.  Dispense: 90 tablet; Refill: 1  -     Comprehensive Metabolic Panel; Future    3. Hyperlipidemia, unspecified hyperlipidemia type  Comments:  uncontrolled hyperlipidemia- Encouraged pt to decrease carbs and sugars.  Continue atorvastatin and Vascepa    Orders:  -     Lipid Panel; Future    4. Hypothyroidism, unspecified type  Comments:  States  is going to release his care back to me for his thyroid since he is stable on his medication.     Orders:  -     TSH; Future    5. Screening for prostate cancer  -     PSA SCREENING; Future    6. Routine lab draw    7. Renal insufficiency  Comments:  last visit he was going to have an appt with nephrology-he is  unsure if he went to that appt. Ordered repeat CMP.       8. Pulmonary emphysema, unspecified emphysema type (HCC)  Comments:  [facility] Trelegy to Wixela-he cannot tell if it is working     9. Obesity (BMI 30-39.9)  Comments:  discussed diet, exercise and wt loss to help reduce comorbidities        Follow Up   Return in about 4 months (around 8/14/2022).  Patient was given instructions and counseling regarding his condition or for health maintenance advice. Please see specific information pulled into the AVS if appropriate.     Asaf Gilmore Ranjana  reports that he quit smoking about 22 years ago. His smoking use included cigarettes. He started smoking about 66 years ago. He has a 44.00 pack-year smoking history. He has never used smokeless tobacco..            YASH Castro    The patient is advised to follow a low fat, low cholesterol diet, attempt to lose weight and continue current medications.

## 2022-04-20 ENCOUNTER — LAB (OUTPATIENT)
Dept: LAB | Facility: HOSPITAL | Age: 77
End: 2022-04-20

## 2022-04-20 DIAGNOSIS — I10 HYPERTENSION, UNSPECIFIED TYPE: ICD-10-CM

## 2022-04-20 DIAGNOSIS — E03.9 HYPOTHYROIDISM, UNSPECIFIED TYPE: ICD-10-CM

## 2022-04-20 DIAGNOSIS — E78.5 HYPERLIPIDEMIA, UNSPECIFIED HYPERLIPIDEMIA TYPE: ICD-10-CM

## 2022-04-20 DIAGNOSIS — Z12.5 SCREENING FOR PROSTATE CANCER: ICD-10-CM

## 2022-04-20 LAB
ALBUMIN SERPL-MCNC: 4.3 G/DL (ref 3.5–5.2)
ALBUMIN/GLOB SERPL: 1.7 G/DL
ALP SERPL-CCNC: 67 U/L (ref 39–117)
ALT SERPL W P-5'-P-CCNC: 13 U/L (ref 1–41)
ANION GAP SERPL CALCULATED.3IONS-SCNC: 13.3 MMOL/L (ref 5–15)
AST SERPL-CCNC: 15 U/L (ref 1–40)
BILIRUB SERPL-MCNC: 0.4 MG/DL (ref 0–1.2)
BUN SERPL-MCNC: 22 MG/DL (ref 8–23)
BUN/CREAT SERPL: 17.7 (ref 7–25)
CALCIUM SPEC-SCNC: 9.2 MG/DL (ref 8.6–10.5)
CHLORIDE SERPL-SCNC: 104 MMOL/L (ref 98–107)
CHOLEST SERPL-MCNC: 134 MG/DL (ref 0–200)
CO2 SERPL-SCNC: 21.7 MMOL/L (ref 22–29)
CREAT SERPL-MCNC: 1.24 MG/DL (ref 0.76–1.27)
EGFRCR SERPLBLD CKD-EPI 2021: 60.6 ML/MIN/1.73
GLOBULIN UR ELPH-MCNC: 2.5 GM/DL
GLUCOSE SERPL-MCNC: 102 MG/DL (ref 65–99)
HDLC SERPL-MCNC: 37 MG/DL (ref 40–60)
LDLC SERPL CALC-MCNC: 66 MG/DL (ref 0–100)
LDLC/HDLC SERPL: 1.61 {RATIO}
POTASSIUM SERPL-SCNC: 4.4 MMOL/L (ref 3.5–5.2)
PROT SERPL-MCNC: 6.8 G/DL (ref 6–8.5)
PSA SERPL-MCNC: 0.71 NG/ML (ref 0–4)
SODIUM SERPL-SCNC: 139 MMOL/L (ref 136–145)
TRIGL SERPL-MCNC: 188 MG/DL (ref 0–150)
TSH SERPL DL<=0.05 MIU/L-ACNC: 1.91 UIU/ML (ref 0.27–4.2)
VLDLC SERPL-MCNC: 31 MG/DL (ref 5–40)

## 2022-04-20 PROCEDURE — 84443 ASSAY THYROID STIM HORMONE: CPT

## 2022-04-20 PROCEDURE — 80053 COMPREHEN METABOLIC PANEL: CPT

## 2022-04-20 PROCEDURE — 80061 LIPID PANEL: CPT

## 2022-04-20 PROCEDURE — 36415 COLL VENOUS BLD VENIPUNCTURE: CPT

## 2022-04-20 PROCEDURE — G0103 PSA SCREENING: HCPCS

## 2022-04-22 ENCOUNTER — TELEPHONE (OUTPATIENT)
Dept: FAMILY MEDICINE CLINIC | Facility: CLINIC | Age: 77
End: 2022-04-22

## 2022-04-22 DIAGNOSIS — R73.09 ELEVATED GLUCOSE: Primary | ICD-10-CM

## 2022-04-22 NOTE — TELEPHONE ENCOUNTER
----- Message from YASH Castro sent at 4/21/2022 10:26 PM EDT -----  TSH, PSA WNL. Lipid shows elevated trig and low hdl-need to reduce carb and sugar intake and increase exercise. CMP normal w/exception of elevated glucose-add a1c

## 2022-04-26 ENCOUNTER — LAB (OUTPATIENT)
Dept: LAB | Facility: HOSPITAL | Age: 77
End: 2022-04-26

## 2022-04-26 DIAGNOSIS — R73.09 ELEVATED GLUCOSE: ICD-10-CM

## 2022-04-26 LAB — HBA1C MFR BLD: 5.7 % (ref 4.8–5.6)

## 2022-04-26 PROCEDURE — 36415 COLL VENOUS BLD VENIPUNCTURE: CPT

## 2022-04-26 PROCEDURE — 83036 HEMOGLOBIN GLYCOSYLATED A1C: CPT

## 2022-04-27 ENCOUNTER — ANESTHESIA (OUTPATIENT)
Dept: GASTROENTEROLOGY | Facility: HOSPITAL | Age: 77
End: 2022-04-27

## 2022-04-27 ENCOUNTER — ANESTHESIA EVENT (OUTPATIENT)
Dept: GASTROENTEROLOGY | Facility: HOSPITAL | Age: 77
End: 2022-04-27

## 2022-04-27 ENCOUNTER — HOSPITAL ENCOUNTER (OUTPATIENT)
Facility: HOSPITAL | Age: 77
Setting detail: HOSPITAL OUTPATIENT SURGERY
Discharge: HOME | End: 2022-04-27
Attending: SURGERY | Admitting: SURGERY

## 2022-04-27 ENCOUNTER — TELEPHONE (OUTPATIENT)
Dept: FAMILY MEDICINE CLINIC | Facility: CLINIC | Age: 77
End: 2022-04-27

## 2022-04-27 VITALS
DIASTOLIC BLOOD PRESSURE: 65 MMHG | HEART RATE: 88 BPM | RESPIRATION RATE: 19 BRPM | BODY MASS INDEX: 31.11 KG/M2 | TEMPERATURE: 97.2 F | WEIGHT: 229.72 LBS | SYSTOLIC BLOOD PRESSURE: 121 MMHG | OXYGEN SATURATION: 95 % | HEIGHT: 72 IN

## 2022-04-27 DIAGNOSIS — K21.9 GERD WITHOUT ESOPHAGITIS: ICD-10-CM

## 2022-04-27 DIAGNOSIS — R73.01 IMPAIRED FASTING GLUCOSE: Primary | ICD-10-CM

## 2022-04-27 DIAGNOSIS — Z12.11 SCREENING FOR MALIGNANT NEOPLASM OF COLON: ICD-10-CM

## 2022-04-27 DIAGNOSIS — K22.70 BARRETT'S ESOPHAGUS: ICD-10-CM

## 2022-04-27 PROCEDURE — 25010000002 PROPOFOL 10 MG/ML EMULSION: Performed by: NURSE ANESTHETIST, CERTIFIED REGISTERED

## 2022-04-27 PROCEDURE — 88305 TISSUE EXAM BY PATHOLOGIST: CPT | Performed by: SURGERY

## 2022-04-27 PROCEDURE — 25010000002 PHENYLEPHRINE 10 MG/ML SOLUTION: Performed by: NURSE ANESTHETIST, CERTIFIED REGISTERED

## 2022-04-27 PROCEDURE — 88342 IMHCHEM/IMCYTCHM 1ST ANTB: CPT | Performed by: SURGERY

## 2022-04-27 PROCEDURE — 88341 IMHCHEM/IMCYTCHM EA ADD ANTB: CPT | Performed by: SURGERY

## 2022-04-27 RX ORDER — SODIUM CHLORIDE, SODIUM LACTATE, POTASSIUM CHLORIDE, CALCIUM CHLORIDE 600; 310; 30; 20 MG/100ML; MG/100ML; MG/100ML; MG/100ML
30 INJECTION, SOLUTION INTRAVENOUS CONTINUOUS
Status: DISCONTINUED | OUTPATIENT
Start: 2022-04-27 | End: 2022-04-27 | Stop reason: HOSPADM

## 2022-04-27 RX ORDER — SODIUM CHLORIDE 0.9 % (FLUSH) 0.9 %
3 SYRINGE (ML) INJECTION EVERY 12 HOURS SCHEDULED
Status: DISCONTINUED | OUTPATIENT
Start: 2022-04-27 | End: 2022-04-27 | Stop reason: HOSPADM

## 2022-04-27 RX ORDER — LIDOCAINE HYDROCHLORIDE 20 MG/ML
INJECTION, SOLUTION EPIDURAL; INFILTRATION; INTRACAUDAL; PERINEURAL AS NEEDED
Status: DISCONTINUED | OUTPATIENT
Start: 2022-04-27 | End: 2022-04-27 | Stop reason: SURG

## 2022-04-27 RX ORDER — PROPOFOL 10 MG/ML
VIAL (ML) INTRAVENOUS AS NEEDED
Status: DISCONTINUED | OUTPATIENT
Start: 2022-04-27 | End: 2022-04-27 | Stop reason: SURG

## 2022-04-27 RX ORDER — SODIUM CHLORIDE 0.9 % (FLUSH) 0.9 %
10 SYRINGE (ML) INJECTION AS NEEDED
Status: DISCONTINUED | OUTPATIENT
Start: 2022-04-27 | End: 2022-04-27 | Stop reason: HOSPADM

## 2022-04-27 RX ORDER — PHENYLEPHRINE HYDROCHLORIDE 10 MG/ML
INJECTION INTRAVENOUS AS NEEDED
Status: DISCONTINUED | OUTPATIENT
Start: 2022-04-27 | End: 2022-04-27 | Stop reason: SURG

## 2022-04-27 RX ADMIN — PROPOFOL 50 MG: 10 INJECTION, EMULSION INTRAVENOUS at 08:59

## 2022-04-27 RX ADMIN — PROPOFOL 200 MCG/KG/MIN: 10 INJECTION, EMULSION INTRAVENOUS at 08:59

## 2022-04-27 RX ADMIN — LIDOCAINE HYDROCHLORIDE 100 MG: 20 INJECTION, SOLUTION EPIDURAL; INFILTRATION; INTRACAUDAL; PERINEURAL at 08:59

## 2022-04-27 RX ADMIN — PHENYLEPHRINE HYDROCHLORIDE 100 MCG: 10 INJECTION INTRAVENOUS at 09:35

## 2022-04-27 RX ADMIN — SODIUM CHLORIDE, POTASSIUM CHLORIDE, SODIUM LACTATE AND CALCIUM CHLORIDE: 600; 310; 30; 20 INJECTION, SOLUTION INTRAVENOUS at 08:57

## 2022-04-27 RX ADMIN — PHENYLEPHRINE HYDROCHLORIDE 100 MCG: 10 INJECTION INTRAVENOUS at 09:22

## 2022-04-27 NOTE — ANESTHESIA POSTPROCEDURE EVALUATION
Patient: Asaf Chilel    Procedure Summary     Date: 04/27/22 Room / Location: [facility] ENDOSCOPY 3 / [facility] ENDOSCOPY    Anesthesia Start: 0857 Anesthesia Stop: 0949    Procedures:       COLONOSCOPY WITH POLYPECTOMY (N/A )      ESOPHAGOGASTRODUODENOSCOPY WITH BIOPSIES AND POLYPECTOMY (N/A ) Diagnosis:       GERD without esophagitis      Tapia's esophagus      Screening for malignant neoplasm of colon      (GERD without esophagitis [K21.9])      (Tapia's esophagus [K22.70])      (Screening for malignant neoplasm of colon [Z12.11])    Surgeons: Juarez Seo MD Provider: Bobby Atkinson MD    Anesthesia Type: general ASA Status: 3          Anesthesia Type: general    Vitals  Vitals Value Taken Time   /86 04/27/22 0955   Temp 36.2 °C (97.2 °F) 04/27/22 0945   Pulse 94 04/27/22 0955   Resp 16 04/27/22 0955   SpO2 95 % 04/27/22 0950           Post Anesthesia Care and Evaluation    Patient location during evaluation: bedside  Patient participation: complete - patient participated  Level of consciousness: awake  Pain management: adequate  Airway patency: patent  Anesthetic complications: No anesthetic complications  PONV Status: none  Cardiovascular status: acceptable and stable  Respiratory status: acceptable  Hydration status: acceptable    Comments: An Anesthesiologist personally participated in the most demanding procedures (including induction and emergence if applicable) in the anesthesia plan, monitored the course of anesthesia administration at frequent intervals and remained physically present and available for immediate diagnosis and treatment of emergencies.

## 2022-04-27 NOTE — ANESTHESIA PREPROCEDURE EVALUATION
" Anesthesia Evaluation     Patient summary reviewed and Nursing notes reviewed   no history of anesthetic complications:  NPO Solid Status: > 8 hours  NPO Liquid Status: > 2 hours           Airway   Mallampati: II  TM distance: >3 FB  Neck ROM: full  No difficulty expected  Dental      Pulmonary - negative pulmonary ROS and normal exam    breath sounds clear to auscultation  Cardiovascular - normal exam  Exercise tolerance: good (4-7 METS)    ECG reviewed  Rhythm: regular  Rate: normal    (+) hypertension, CHF (\"cardiomyopathy\", no echo) , PVD, hyperlipidemia,     ROS comment: LBBB    Neuro/Psych- negative ROS  GI/Hepatic/Renal/Endo    (+) obesity,  GERD,  thyroid problem hypothyroidism    Musculoskeletal     Abdominal    Substance History      OB/GYN          Other                        Anesthesia Plan    ASA 3     general   total IV anesthesia    Anesthetic plan, all risks, benefits, and alternatives have been provided, discussed and informed consent has been obtained with: patient.        CODE STATUS:       "

## 2022-04-27 NOTE — TELEPHONE ENCOUNTER
----- Message from YASH Castro sent at 4/27/2022 12:35 PM EDT -----  A1c 5.7%-impaired fasting glucose-watch carb and sugar intake-recheck in

## 2022-05-02 LAB
CYTO UR: NORMAL
LAB AP CASE REPORT: NORMAL
LAB AP CLINICAL INFORMATION: NORMAL
LAB AP SPECIAL STAINS: NORMAL
PATH REPORT.FINAL DX SPEC: NORMAL
PATH REPORT.GROSS SPEC: NORMAL

## 2022-05-05 PROBLEM — E66.9 OBESITY (BMI 30-39.9): Status: ACTIVE | Noted: 2022-05-05

## 2022-05-16 RX ORDER — MECLIZINE HYDROCHLORIDE 25 MG/1
25 TABLET ORAL 3 TIMES DAILY PRN
Qty: 90 TABLET | Refills: 2 | Status: SHIPPED | OUTPATIENT
Start: 2022-05-16 | End: 2022-08-22

## 2022-06-15 ENCOUNTER — LAB (OUTPATIENT)
Dept: LAB | Facility: HOSPITAL | Age: 77
End: 2022-06-15

## 2022-06-15 DIAGNOSIS — E78.00 HIGH CHOLESTEROL: Chronic | ICD-10-CM

## 2022-06-15 DIAGNOSIS — R73.01 IMPAIRED FASTING GLUCOSE: ICD-10-CM

## 2022-06-15 LAB
CHOLEST SERPL-MCNC: 150 MG/DL (ref 0–200)
HBA1C MFR BLD: 6.1 % (ref 4.8–5.6)
HDLC SERPL-MCNC: 37 MG/DL (ref 40–60)
LDLC SERPL CALC-MCNC: 70 MG/DL (ref 0–100)
LDLC/HDLC SERPL: 1.6 {RATIO}
TRIGL SERPL-MCNC: 269 MG/DL (ref 0–150)
VLDLC SERPL-MCNC: 43 MG/DL (ref 5–40)

## 2022-06-15 PROCEDURE — 83036 HEMOGLOBIN GLYCOSYLATED A1C: CPT

## 2022-06-15 PROCEDURE — 80061 LIPID PANEL: CPT

## 2022-06-15 PROCEDURE — 36415 COLL VENOUS BLD VENIPUNCTURE: CPT

## 2022-06-22 ENCOUNTER — OFFICE VISIT (OUTPATIENT)
Dept: CARDIOLOGY | Facility: CLINIC | Age: 77
End: 2022-06-22

## 2022-06-22 VITALS
SYSTOLIC BLOOD PRESSURE: 102 MMHG | WEIGHT: 229 LBS | DIASTOLIC BLOOD PRESSURE: 54 MMHG | BODY MASS INDEX: 31.02 KG/M2 | HEART RATE: 65 BPM | HEIGHT: 72 IN

## 2022-06-22 DIAGNOSIS — E78.2 MIXED HYPERLIPIDEMIA: ICD-10-CM

## 2022-06-22 DIAGNOSIS — E66.2 CLASS 1 OBESITY WITH ALVEOLAR HYPOVENTILATION AND BODY MASS INDEX (BMI) OF 31.0 TO 31.9 IN ADULT, UNSPECIFIED WHETHER SERIOUS COMORBIDITY PRESENT: ICD-10-CM

## 2022-06-22 DIAGNOSIS — E78.00 HIGH CHOLESTEROL: ICD-10-CM

## 2022-06-22 DIAGNOSIS — I42.9 CARDIOMYOPATHY, UNSPECIFIED TYPE: Chronic | ICD-10-CM

## 2022-06-22 DIAGNOSIS — I10 PRIMARY HYPERTENSION: ICD-10-CM

## 2022-06-22 DIAGNOSIS — I50.42 CHRONIC COMBINED SYSTOLIC AND DIASTOLIC CONGESTIVE HEART FAILURE: Primary | ICD-10-CM

## 2022-06-22 PROCEDURE — 99214 OFFICE O/P EST MOD 30 MIN: CPT | Performed by: INTERNAL MEDICINE

## 2022-06-22 NOTE — PROGRESS NOTES
Office Visit    Chief Complaint  Hypertension, Cardiomyopathy, and Hyperlipidemia    Subjective            Asaf Chilel presents to [facility] CARDIOLOGY  Mr. Chilel is a 77 years old gentleman with chronic combined CHF with recovery of LV function who is doing very well.  He denies chest pain palpitation shortness of breath dizziness syncope.  The last time his ejection fraction was checked it was 50%.      Past Medical History:   Diagnosis Date   • Anxiety    • Cancer (HCC)    • Cardiomyopathy (HCC)    • Gastroesophageal reflux    • Gout    • High cholesterol    • Hip pain    • Hoarseness, chronic    • Hypertension    • Hypothyroid    • AJ (iron deficiency anemia) 2015   • Limb pain    • Lumbago 2013    MRI shows abnormal appearance of bone marrow. Workup negative pet hem/onc   • Peripheral vascular disease (HCC) 2015   • Sciatica 2013   • Tremor        No Known Allergies     Past Surgical History:   Procedure Laterality Date   • COLONOSCOPY      normal Dr. Bingham   • COLONOSCOPY N/A 2022    Procedure: COLONOSCOPY WITH POLYPECTOMY;  Surgeon: Juarez Seo MD;  Location: [facility] ENDOSCOPY;  Service: General;  Laterality: N/A;  DIVERTICULOSIS, COLON POLYP   • ENDOSCOPY N/A 2022    Procedure: ESOPHAGOGASTRODUODENOSCOPY WITH BIOPSIES AND POLYPECTOMY;  Surgeon: Juarez Seo MD;  Location: [facility] ENDOSCOPY;  Service: General;  Laterality: N/A;  GASTRIC POLYPS   • EYE SURGERY Right     cancer removed x 2   • MANDIBLE SURGERY      cancer removed   • SHOULDER SURGERY Right         Social History     Tobacco Use   • Smoking status: Former Smoker     Packs/day: 1.00     Years: 44.00     Pack years: 44.00     Types: Cigarettes     Start date:      Quit date:      Years since quittin.4   • Smokeless tobacco: Never Used   Vaping Use   • Vaping Use: Never used   Substance Use Topics   • Alcohol use: Not Currently     Comment: last drink 21 years  ago   • Drug use: Never       Family History   Problem Relation Age of Onset   • Cancer Mother    • Cancer Father    • Cancer Brother    • Cancer Other    • Heart failure Other    • Other Other         spine problems    • Malig Hyperthermia Neg Hx         Prior to Admission medications    Medication Sig Start Date End Date Taking? Authorizing Provider   albuterol sulfate  (90 Base) MCG/ACT inhaler INHALE 1 TO 2 PUFFS BY MOUTH EVERY 6 HOURS AS NEEDED  Patient taking differently: Inhale 1 puff Every 4 (Four) Hours As Needed. 8/16/21  Yes Concetta Carson APRN   allopurinol (ZYLOPRIM) 300 MG tablet Take 1 tablet by mouth Daily.  Patient taking differently: Take 300 mg by mouth Daily With Lunch. 3/17/22  Yes Concetta Carson APRN   amitriptyline (ELAVIL) 25 MG tablet Take 1 tablet by mouth every night at bedtime. 4/14/22  Yes Concetta Carson APRN   aspirin 81 MG EC tablet Take 81 mg by mouth Daily. Last dose Friday 4/22/22   Yes ProviderJona MD   atorvastatin (LIPITOR) 80 MG tablet Take 1 tablet by mouth Daily. 3/17/22  Yes Concetta Carson APRN   busPIRone (BUSPAR) 10 MG tablet Take 1 tablet by mouth 3 (Three) Times a Day. 3/17/22  Yes Concetta Carson APRN   ketoconazole (NIZORAL) 2 % cream Apply 1 application topically to the appropriate area as directed Daily. 5/13/21  Yes Jona Gibson MD   ketoconazole (NIZORAL) 2 % shampoo Apply 1 application topically to the appropriate area as directed 1 (One) Time Per Week. 5/13/21  Yes ProviderJona MD   lisinopril (PRINIVIL,ZESTRIL) 40 MG tablet Take 1 tablet by mouth Daily. 4/14/22  Yes Concetta Carson APRN   meclizine (ANTIVERT) 25 MG tablet Take 1 tablet by mouth 3 (Three) Times a Day As Needed for Nausea. 5/16/22  Yes Concetta Carson APRN   multivitamin with minerals tablet tablet Take 1 tablet by mouth Daily.   Yes Jona Gibson MD   omeprazole (priLOSEC) 20 MG capsule Take 2  "capsules by mouth Daily.  Patient taking differently: Take 40 mg by mouth 2 (Two) Times a Day. 4/4/22  Yes Concetta Carson APRN   propranolol (INDERAL) 40 MG tablet Take 1 tablet by mouth 3 (Three) Times a Day. 4/14/22  Yes Concetta Carson APRN   spironolactone (ALDACTONE) 25 MG tablet TAKE 1 TABLET BY MOUTH EVERY OTHER DAY, OR AS DIRECTED  Patient taking differently: Take 25 mg by mouth Daily. 2/21/22  Yes Malaika Florentino APRN   Synthroid 88 MCG tablet Take 88 mcg by mouth. 5/27/21  Yes Provider, MD Jona   Vascepchauncey 1 g capsule capsule TAKE 2 CAPSULES BY MOUTH 2 TIMES A DAY WITH FOOD (DO NOT CHEW OR OPEN)  Patient taking differently: Take 2 capsules by mouth 2 (Two) Times a Day With Meals. 10/4/21  Yes Malaika Florentino APRN        Review of Systems   Constitutional: Negative for fatigue.   Respiratory: Negative for cough and shortness of breath.    Cardiovascular: Negative for chest pain, palpitations and leg swelling.   Neurological: Negative for dizziness.        Objective     /54 (BP Location: Left arm)   Pulse 65   Ht 182.9 cm (72\")   Wt 104 kg (229 lb)   BMI 31.06 kg/m²       Physical Exam  Constitutional:       General: He is awake.      Appearance: Normal appearance.   Neck:      Thyroid: No thyromegaly.      Vascular: No carotid bruit or JVD.   Cardiovascular:      Rate and Rhythm: Normal rate and regular rhythm.      Chest Wall: PMI is not displaced.      Pulses: Normal pulses.      Heart sounds: S1 normal and S2 normal. Murmur heard.    Systolic murmur is present.    No friction rub. No gallop. No S3 or S4 sounds.   Pulmonary:      Effort: Pulmonary effort is normal.      Breath sounds: Normal breath sounds and air entry. No wheezing, rhonchi or rales.   Abdominal:      General: Bowel sounds are normal.      Palpations: Abdomen is soft. There is no mass.      Tenderness: There is no abdominal tenderness.   Musculoskeletal:      Cervical back: Neck supple.      Right " lower leg: No edema.      Left lower leg: No edema.   Neurological:      Mental Status: He is alert and oriented to person, place, and time.   Psychiatric:         Mood and Affect: Mood normal.         Behavior: Behavior is cooperative.       Lab Results   Component Value Date    BNP 43 12/01/2020     CMP    CMP 9/15/21 1/12/22 4/20/22   Glucose 93 101 (A) 102 (A)   BUN 21 20 22   Creatinine 1.19 1.23 1.24   eGFR Non  Am 60 (A) 57 (A)    Sodium 137 140 139   Potassium 4.6 4.3 4.4   Chloride 103 102 104   Calcium 9.5 9.7 9.2   Albumin 4.50 4.70 4.30   Total Bilirubin 0.4 0.6 0.4   Alkaline Phosphatase 64 80 67   AST (SGOT) 18 17 15   ALT (SGPT) 16 14 13   (A) Abnormal value       Comments are available for some flowsheets but are not being displayed.              Lipid Panel    Lipid Panel 1/12/22 4/20/22 6/15/22   Total Cholesterol 158 134 150   Triglycerides 295 (A) 188 (A) 269 (A)   HDL Cholesterol 38 (A) 37 (A) 37 (A)   VLDL Cholesterol 48 (A) 31 43 (A)   LDL Cholesterol  72 66 70   LDL/HDL Ratio 1.61 1.61 1.60   (A) Abnormal value             Lab Results   Component Value Date    TSH 1.910 04/20/2022    TSH 1.190 03/09/2021    TSH 2.470 12/01/2020      Lab Results   Component Value Date    FREET4 1.3 12/01/2020      No results found for: DDIMERQUANT  No results found for: MG   No results found for: DIGOXIN   A1C Last 3 Results    HGBA1C Last 3 Results 4/26/22 6/15/22   Hemoglobin A1C 5.70 (A) 6.10 (A)   (A) Abnormal value                     Result Review :                           Assessment and Plan        Diagnoses and all orders for this visit:    1. Chronic combined systolic and diastolic congestive heart failure (HCC) (Primary)  Assessment & Plan:  His CHF is very well compensated and his ejection fraction is almost normal.  He is not on Arni therapy since he had improved before Arni became available.  He is only on propranolol because of his prior tremors and will continue the same      2.  Cardiomyopathy, unspecified type (HCC)  -     Lipid Panel; Future  -     Comprehensive Metabolic Panel; Future  -     Magnesium; Future    3. High cholesterol  -     Lipid Panel; Future  -     Comprehensive Metabolic Panel; Future  -     Magnesium; Future    4. Mixed hyperlipidemia  Assessment & Plan:  His LDL is at goal but his triglycerides are still elevated despite Vascepa therapy.  I have suggested to him to reduce the carbohydrate content in his diet and lose weight    Orders:  -     Lipid Panel; Future  -     Comprehensive Metabolic Panel; Future  -     Magnesium; Future    5. Primary hypertension  -     Lipid Panel; Future  -     Comprehensive Metabolic Panel; Future  -     Magnesium; Future    6. Class 1 obesity with alveolar hypoventilation and body mass index (BMI) of 31.0 to 31.9 in adult, unspecified whether serious comorbidity present (HCC)          Follow Up     Return in about 7 months (around 1/22/2023) for Dr Carlton for fu.    Patient was given instructions and counseling regarding his condition or for health maintenance advice. Please see specific information pulled into the AVS if appropriate.     Arnaldo Duvall MD  06/22/22 09:30 EDT

## 2022-06-22 NOTE — ASSESSMENT & PLAN NOTE
His CHF is very well compensated and his ejection fraction is almost normal.  He is not on Arni therapy since he had improved before Arni became available.  He is only on propranolol because of his prior tremors and will continue the same

## 2022-06-22 NOTE — ASSESSMENT & PLAN NOTE
His LDL is at goal but his triglycerides are still elevated despite Vascepa therapy.  I have suggested to him to reduce the carbohydrate content in his diet and lose weight

## 2022-06-27 ENCOUNTER — TELEPHONE (OUTPATIENT)
Dept: FAMILY MEDICINE CLINIC | Facility: CLINIC | Age: 77
End: 2022-06-27

## 2022-07-05 DIAGNOSIS — R06.02 SHORTNESS OF BREATH: Primary | ICD-10-CM

## 2022-07-27 DIAGNOSIS — F41.9 ANXIETY: ICD-10-CM

## 2022-07-27 DIAGNOSIS — J45.909 ASTHMA, UNSPECIFIED ASTHMA SEVERITY, UNSPECIFIED WHETHER COMPLICATED, UNSPECIFIED WHETHER PERSISTENT: ICD-10-CM

## 2022-07-27 RX ORDER — ICOSAPENT ETHYL 1000 MG/1
CAPSULE ORAL
Qty: 360 CAPSULE | Refills: 3 | Status: SHIPPED | OUTPATIENT
Start: 2022-07-27 | End: 2023-02-08 | Stop reason: SDUPTHER

## 2022-07-27 RX ORDER — ALBUTEROL SULFATE 90 UG/1
1 AEROSOL, METERED RESPIRATORY (INHALATION) EVERY 4 HOURS PRN
Qty: 6.7 G | Refills: 2 | Status: SHIPPED | OUTPATIENT
Start: 2022-07-27

## 2022-07-27 RX ORDER — BUSPIRONE HYDROCHLORIDE 10 MG/1
TABLET ORAL
Qty: 270 TABLET | Refills: 1 | Status: SHIPPED | OUTPATIENT
Start: 2022-07-27

## 2022-07-27 NOTE — TELEPHONE ENCOUNTER
appt on 8/16/22  Last OV 4/4/2022  Albuterol last filled on 8/16/22  Buspar last filled on 3/17/22 # 270 x 1

## 2022-08-16 ENCOUNTER — OFFICE VISIT (OUTPATIENT)
Dept: FAMILY MEDICINE CLINIC | Facility: CLINIC | Age: 77
End: 2022-08-16

## 2022-08-16 VITALS
WEIGHT: 226 LBS | OXYGEN SATURATION: 97 % | DIASTOLIC BLOOD PRESSURE: 61 MMHG | SYSTOLIC BLOOD PRESSURE: 126 MMHG | HEART RATE: 56 BPM | BODY MASS INDEX: 30.65 KG/M2

## 2022-08-16 DIAGNOSIS — R06.02 SHORTNESS OF BREATH: ICD-10-CM

## 2022-08-16 DIAGNOSIS — Z23 NEED FOR SHINGLES VACCINE: Primary | ICD-10-CM

## 2022-08-16 DIAGNOSIS — Z23 NEED FOR VACCINATION AGAINST STREPTOCOCCUS PNEUMONIAE: ICD-10-CM

## 2022-08-16 PROCEDURE — 1160F RVW MEDS BY RX/DR IN RCRD: CPT | Performed by: NURSE PRACTITIONER

## 2022-08-16 PROCEDURE — 1170F FXNL STATUS ASSESSED: CPT | Performed by: NURSE PRACTITIONER

## 2022-08-16 PROCEDURE — G0009 ADMIN PNEUMOCOCCAL VACCINE: HCPCS | Performed by: NURSE PRACTITIONER

## 2022-08-16 PROCEDURE — G0439 PPPS, SUBSEQ VISIT: HCPCS | Performed by: NURSE PRACTITIONER

## 2022-08-16 PROCEDURE — 90677 PCV20 VACCINE IM: CPT | Performed by: NURSE PRACTITIONER

## 2022-08-16 PROCEDURE — 1126F AMNT PAIN NOTED NONE PRSNT: CPT | Performed by: NURSE PRACTITIONER

## 2022-08-16 NOTE — PROGRESS NOTES
The ABCs of the Annual Wellness Visit  Subsequent Medicare Wellness Visit    Chief Complaint   Patient presents with   • Medicare Wellness-subsequent      Subjective    History of Present Illness:  Asaf Chilel is a 77 y.o. male who presents for a Subsequent Medicare Wellness Visit.    Mr. Chilel presents today for annual wellness exam and 4-month follow-up.    Shortness of breath- The patient needs a refill on his Trelegy. He reports that he has tried other medicines and this one works the best. He states that he does not take Trelegy daily. He reports that he does not wheeze. He reports that his breathing has been good and stable with the Trelegy.    Immunizations- The patient is due for his pneumonia and tetanus vaccine.     The following portions of the patient's history were reviewed and   updated as appropriate: allergies, current medications, past family history, past medical history, past social history, past surgical history and problem list.    Compared to one year ago, the patient feels his physical   health is the same.    Compared to one year ago, the patient feels his mental   health is the same.    Recent Hospitalizations:  He was not admitted to the hospital during the last year.       Current Medical Providers:  Patient Care Team:  Concetta Carson APRN as PCP - General (Nurse Practitioner)  Marina Martin APRN as Nurse Practitioner (Nurse Practitioner)    Outpatient Medications Prior to Visit   Medication Sig Dispense Refill   • albuterol sulfate  (90 Base) MCG/ACT inhaler Inhale 1 puff Every 4 (Four) Hours As Needed for Shortness of Air. 6.7 g 2   • allopurinol (ZYLOPRIM) 300 MG tablet Take 1 tablet by mouth Daily. (Patient taking differently: Take 300 mg by mouth Daily With Lunch.) 90 tablet 1   • amitriptyline (ELAVIL) 25 MG tablet Take 1 tablet by mouth every night at bedtime. 90 tablet 1   • aspirin 81 MG EC tablet Take 81 mg by mouth Daily. Last dose Friday 4/22/22      • atorvastatin (LIPITOR) 80 MG tablet Take 1 tablet by mouth Daily. 90 tablet 1   • busPIRone (BUSPAR) 10 MG tablet TAKE 1 TABLET BY MOUTH THREE TIMES A  tablet 1   • ketoconazole (NIZORAL) 2 % cream Apply 1 application topically to the appropriate area as directed Daily.     • ketoconazole (NIZORAL) 2 % shampoo Apply 1 application topically to the appropriate area as directed 1 (One) Time Per Week.     • lisinopril (PRINIVIL,ZESTRIL) 40 MG tablet Take 1 tablet by mouth Daily. 90 tablet 1   • meclizine (ANTIVERT) 25 MG tablet Take 1 tablet by mouth 3 (Three) Times a Day As Needed for Nausea. 90 tablet 2   • multivitamin with minerals tablet tablet Take 1 tablet by mouth Daily.     • omeprazole (priLOSEC) 20 MG capsule Take 2 capsules by mouth Daily. (Patient taking differently: Take 20 mg by mouth 2 (Two) Times a Day.) 180 capsule 1   • spironolactone (ALDACTONE) 25 MG tablet TAKE 1 TABLET BY MOUTH EVERY OTHER DAY, OR AS DIRECTED (Patient taking differently: Take 25 mg by mouth Daily.) 45 tablet 3   • Synthroid 88 MCG tablet Take 88 mcg by mouth.     • Vascepa 1 g capsule capsule TAKE 2 CAPS BY MOUTH TWICE DAILY WITH FOOD. DO NOT CHEW OR OPEN 360 capsule 3   • Fluticasone-Umeclidin-Vilant (Trelegy Ellipta) 100-62.5-25 MCG/INH inhaler Inhale 1 puff Daily. Indications: lot [id number] 2 boxes exp 12/23 28 each 0   • propranolol (INDERAL) 40 MG tablet Take 1 tablet by mouth 3 (Three) Times a Day. 270 tablet 1     No facility-administered medications prior to visit.       No opioid medication identified on active medication list. I have reviewed chart for other potential  high risk medication/s and harmful drug interactions in the elderly.          Aspirin is on active medication list. Aspirin use is indicated based on review of current medical condition/s. Pros and cons of this therapy have been discussed today. Benefits of this medication outweigh potential harm.  Patient has been encouraged to continue taking this  "medication.  .      Patient Active Problem List   Diagnosis   • Hypertension   • Gastroesophageal reflux   • Anxiety   • Anemia, iron deficiency   • Gout   • High cholesterol   • Hip pain   • Low back pain   • Peripheral vascular disease (HCC)   • Skin cancer   • Hypothyroid   • Impaired fasting glucose   • Rodríguez's neuroma of right foot   • Cardiomyopathy (HCC)   • Limb pain   • Tapia's esophagus   • Proteinuria   • Hyperlipidemia   • Obesity (BMI 30-39.9)   • Chronic combined systolic and diastolic congestive heart failure (HCC)     Advance Care Planning  Advance Directive is on file.  ACP discussion was held with the patient during this visit. Patient has an advance directive in EMR which is still valid.           Objective    Vitals:    08/16/22 0929   BP: 126/61   BP Location: Right arm   Patient Position: Sitting   Cuff Size: Adult   Pulse: 56   SpO2: 97%   Weight: 103 kg (226 lb)   PainSc: 0-No pain     Estimated body mass index is 30.65 kg/m² as calculated from the following:    Height as of 6/22/22: 182.9 cm (72\").    Weight as of this encounter: 103 kg (226 lb).    BMI is >= 30 and <35. (Class 1 Obesity). The following options were offered after discussion;: exercise counseling/recommendations and nutrition counseling/recommendations      Does the patient have evidence of cognitive impairment? No    Physical Exam  Vitals reviewed.   Constitutional:       Appearance: Normal appearance. He is well-developed.   HENT:      Head: Normocephalic and atraumatic.      Right Ear: External ear normal.      Left Ear: External ear normal.      Mouth/Throat:      Pharynx: No oropharyngeal exudate.   Eyes:      Conjunctiva/sclera: Conjunctivae normal.      Pupils: Pupils are equal, round, and reactive to light.   Cardiovascular:      Rate and Rhythm: Normal rate and regular rhythm.      Heart sounds: No murmur heard.    No friction rub. No gallop.   Pulmonary:      Effort: Pulmonary effort is normal.      Breath " sounds: Normal breath sounds. No wheezing or rhonchi.   Abdominal:      General: Bowel sounds are normal. There is no distension.      Palpations: Abdomen is soft.      Tenderness: There is no abdominal tenderness.   Skin:     General: Skin is warm and dry.   Neurological:      Mental Status: He is alert and oriented to person, place, and time.      Cranial Nerves: No cranial nerve deficit.   Psychiatric:         Mood and Affect: Mood and affect normal.         Behavior: Behavior normal.         Thought Content: Thought content normal.         Judgment: Judgment normal.       Lab Results   Component Value Date    TRIG 269 (H) 06/15/2022    HDL 37 (L) 06/15/2022    LDL 70 06/15/2022    VLDL 43 (H) 06/15/2022    HGBA1C 6.10 (H) 06/15/2022            HEALTH RISK ASSESSMENT    Smoking Status:  Social History     Tobacco Use   Smoking Status Former Smoker   • Packs/day: 1.00   • Years: 44.00   • Pack years: 44.00   • Types: Cigarettes   • Start date:    • Quit date:    • Years since quittin.6   Smokeless Tobacco Never Used     Alcohol Consumption:  Social History     Substance and Sexual Activity   Alcohol Use Not Currently    Comment: last drink 21 years ago     Fall Risk Screen:    [facility] Fall Risk Assessment was completed, and patient is at LOW risk for falls.Assessment completed on:2022    Depression Screening:  PHQ-2/PHQ-9 Depression Screening 2022   Retired PHQ-9 Total Score -   Retired Total Score -   Little Interest or Pleasure in Doing Things 0-->not at all   Feeling Down, Depressed or Hopeless 0-->not at all   PHQ-9: Brief Depression Severity Measure Score 0       Health Habits and Functional and Cognitive Screening:  Functional & Cognitive Status 2022   Do you have difficulty preparing food and eating? No   Do you have difficulty bathing yourself, getting dressed or grooming yourself? No   Do you have difficulty using the toilet? No   Do you have difficulty moving around from place  to place? No   Do you have trouble with steps or getting out of a bed or a chair? No   Current Diet Well Balanced Diet   Dental Exam Up to date   Eye Exam Up to date   Exercise (times per week) 7 times per week   Current Exercises Include (No Data)        Exercise Comment farm   Do you need help using the phone?  No   Are you deaf or do you have serious difficulty hearing?  No   Do you need help with transportation? Yes   Do you need help shopping? No   Do you need help preparing meals?  No   Do you need help with housework?  No   Do you need help with laundry? No   Do you need help taking your medications? No   Do you need help managing money? No   Do you ever drive or ride in a car without wearing a seat belt? No   Have you felt unusual stress, anger or loneliness in the last month? No   Who do you live with? Spouse   If you need help, do you have trouble finding someone available to you? No   Have you been bothered in the last four weeks by sexual problems? No   Do you have difficulty concentrating, remembering or making decisions? No       Age-appropriate Screening Schedule:  Refer to the list below for future screening recommendations based on patient's age, sex and/or medical conditions. Orders for these recommended tests are listed in the plan section. The patient has been provided with a written plan.    Health Maintenance   Topic Date Due   • URINE MICROALBUMIN  10/24/2021   • TDAP/TD VACCINES (1 - Tdap) 10/16/2022 (Originally 6/6/1964)   • ZOSTER VACCINE (2 of 3) 08/16/2023 (Originally 12/27/2009)   • INFLUENZA VACCINE  10/01/2022   • HEMOGLOBIN A1C  12/15/2022   • DIABETIC EYE EXAM  05/16/2023   • LIPID PANEL  06/15/2023              Assessment & Plan   CMS Preventative Services Quick Reference  Risk Factors Identified During Encounter  Immunizations Discussed/Encouraged (specific Immunizations; Tdap, Prevnar 20 (Pneumococcal 20-valent conjugate) and Shingrix  The above risks/problems have been discussed  with the patient.  Follow up actions/plans if indicated are seen below in the Assessment/Plan Section.  Pertinent information has been shared with the patient in the After Visit Summary.    Diagnoses and all orders for this visit:    1. Need for shingles vaccine (Primary)  -     Shingrix Vaccine; Future    2. Shortness of breath  -     Fluticasone-Umeclidin-Vilant (Trelegy Ellipta) 100-62.5-25 MCG/INH inhaler; Inhale 1 puff Daily. Indications: lot RR3V 2 boxes exp 12/23  Dispense: 28 each; Refill: 0        -     The patient's breathing is well controlled without the Trelegy. He was advised to take it as needed.    3. Need for vaccination against Streptococcus pneumoniae  -     Pneumococcal Conjugate Vaccine 20-Valent (PCV20)        Follow Up:   Return in about 4 months (around 12/16/2022).     An After Visit Summary and PPPS were made available to the patient.          I spent 25 minutes caring for Asaf on this date of service. This time includes time spent by me in the following activities:preparing for the visit, reviewing tests, obtaining and/or reviewing a separately obtained history, performing a medically appropriate examination and/or evaluation , counseling and educating the patient/family/caregiver, ordering medications, tests, or procedures, documenting information in the medical record and independently interpreting results and communicating that information with the patient/family/caregiver          Transcribed from ambient dictation for YASH Castro by Rose Marie Benjamin.  08/16/22   12:44 EDT    Patient verbalized consent to the visit recording.

## 2022-08-22 RX ORDER — MECLIZINE HYDROCHLORIDE 25 MG/1
25 TABLET ORAL 3 TIMES DAILY PRN
Qty: 90 TABLET | Refills: 2 | Status: SHIPPED | OUTPATIENT
Start: 2022-08-22 | End: 2022-11-21

## 2022-09-04 DIAGNOSIS — K21.9 GASTROESOPHAGEAL REFLUX DISEASE, UNSPECIFIED WHETHER ESOPHAGITIS PRESENT: ICD-10-CM

## 2022-09-06 RX ORDER — OMEPRAZOLE 20 MG/1
CAPSULE, DELAYED RELEASE ORAL
Qty: 180 CAPSULE | Refills: 1 | Status: SHIPPED | OUTPATIENT
Start: 2022-09-06 | End: 2023-03-15

## 2022-09-09 ENCOUNTER — TRANSCRIBE ORDERS (OUTPATIENT)
Dept: ADMINISTRATIVE | Facility: HOSPITAL | Age: 77
End: 2022-09-09

## 2022-09-09 DIAGNOSIS — E04.1 THYROID NODULE: Primary | ICD-10-CM

## 2022-09-19 ENCOUNTER — TELEPHONE (OUTPATIENT)
Dept: FAMILY MEDICINE CLINIC | Facility: CLINIC | Age: 77
End: 2022-09-19

## 2022-09-19 NOTE — TELEPHONE ENCOUNTER
Caller: Asaf Chilel    Relationship to patient: Self    Best call back number:[phone]    Patient is needing: PATIENT CALLED IN AND WOULD LIKE TO KNOW WHICH SHOTS HE IS DUE TO TAKE. THINKS IT MIGHT BE PNEUMONIA AND SHINGLES BUT IS NOT SURE

## 2022-09-19 NOTE — TELEPHONE ENCOUNTER
He had his pneumonia vaccine when he was here on 8/16/22-he just needs the shingles vaccine-have him wait 1 month in between

## 2022-09-20 NOTE — TELEPHONE ENCOUNTER
Spoke with pt. Advised due for shingles and can stop by the office as a nurse visit since it was ordered at last visit.  Advised to come tues- Friday 7-330 while Concetta is in office.    Pt asked if can  trelegy samples when he comes in this week. Looks like received 2 boxes on 08/16/22. Please advise

## 2022-09-24 ENCOUNTER — HOSPITAL ENCOUNTER (OUTPATIENT)
Dept: ULTRASOUND IMAGING | Facility: HOSPITAL | Age: 77
Discharge: HOME | End: 2022-09-24
Admitting: INTERNAL MEDICINE

## 2022-09-24 DIAGNOSIS — E04.1 THYROID NODULE: ICD-10-CM

## 2022-09-24 PROCEDURE — 76536 US EXAM OF HEAD AND NECK: CPT

## 2022-11-04 DIAGNOSIS — I10 HYPERTENSION, UNSPECIFIED TYPE: ICD-10-CM

## 2022-11-04 RX ORDER — PROPRANOLOL HYDROCHLORIDE 40 MG/1
TABLET ORAL
Qty: 270 TABLET | Refills: 1 | Status: SHIPPED | OUTPATIENT
Start: 2022-11-04

## 2022-11-21 DIAGNOSIS — M10.9 GOUT, UNSPECIFIED CAUSE, UNSPECIFIED CHRONICITY, UNSPECIFIED SITE: ICD-10-CM

## 2022-11-21 DIAGNOSIS — E78.00 HIGH CHOLESTEROL: Chronic | ICD-10-CM

## 2022-11-21 RX ORDER — MECLIZINE HYDROCHLORIDE 25 MG/1
25 TABLET ORAL 3 TIMES DAILY PRN
Qty: 270 TABLET | Refills: 1 | Status: SHIPPED | OUTPATIENT
Start: 2022-11-21

## 2022-11-21 RX ORDER — MECLIZINE HYDROCHLORIDE 25 MG/1
25 TABLET ORAL 3 TIMES DAILY PRN
Qty: 90 TABLET | Refills: 1 | Status: SHIPPED | OUTPATIENT
Start: 2022-11-21 | End: 2022-11-21 | Stop reason: SDUPTHER

## 2022-11-21 RX ORDER — ALLOPURINOL 300 MG/1
TABLET ORAL
Qty: 90 TABLET | Refills: 1 | Status: SHIPPED | OUTPATIENT
Start: 2022-11-21

## 2022-11-21 RX ORDER — ATORVASTATIN CALCIUM 80 MG/1
TABLET, FILM COATED ORAL
Qty: 90 TABLET | Refills: 1 | Status: SHIPPED | OUTPATIENT
Start: 2022-11-21

## 2022-11-21 NOTE — TELEPHONE ENCOUNTER
Incoming Refill Request      Medication requested (name and dose):   meclizine (ANTIVERT) 25 MG tablet    Pharmacy where request should be sent: [facility] #[id number] ASK FOR CLAUDIO    Additional details provided by patient: THE PATIENT IS WANTING THE MEDICATION TO BE 3 MONTHS SUPPLY INSTEAD OF A 90 TABLET FOR THE MONTH.    Best call back number: [phone] ASK FOR CLAUDIO WHO CALLED ABOUT THE MEDICATION    Does the patient have less than a 3 day supply:  [] Yes  [x] No    Karina Salcido  11/21/22, 08:54 EST    {

## 2022-12-16 ENCOUNTER — OFFICE VISIT (OUTPATIENT)
Dept: FAMILY MEDICINE CLINIC | Facility: CLINIC | Age: 77
End: 2022-12-16

## 2022-12-16 VITALS
SYSTOLIC BLOOD PRESSURE: 108 MMHG | WEIGHT: 230.8 LBS | OXYGEN SATURATION: 100 % | HEART RATE: 60 BPM | DIASTOLIC BLOOD PRESSURE: 52 MMHG | HEIGHT: 72 IN | BODY MASS INDEX: 31.26 KG/M2

## 2022-12-16 DIAGNOSIS — R73.01 IMPAIRED FASTING GLUCOSE: ICD-10-CM

## 2022-12-16 DIAGNOSIS — E03.9 HYPOTHYROIDISM, UNSPECIFIED TYPE: ICD-10-CM

## 2022-12-16 DIAGNOSIS — D50.9 IRON DEFICIENCY ANEMIA, UNSPECIFIED IRON DEFICIENCY ANEMIA TYPE: Primary | ICD-10-CM

## 2022-12-16 DIAGNOSIS — E78.00 HIGH CHOLESTEROL: ICD-10-CM

## 2022-12-16 DIAGNOSIS — I10 HYPERTENSION, UNSPECIFIED TYPE: ICD-10-CM

## 2022-12-16 PROCEDURE — 99214 OFFICE O/P EST MOD 30 MIN: CPT | Performed by: NURSE PRACTITIONER

## 2022-12-16 RX ORDER — IBUPROFEN 800 MG/1
800 TABLET ORAL EVERY 6 HOURS PRN
COMMUNITY
Start: 2022-11-17 | End: 2023-02-08

## 2022-12-16 NOTE — PROGRESS NOTES
Chief Complaint  Hypertension and iron deficiency anemia    SUBJECTIVE  Asaf Chilel presents to [facility].     History of Present Illness     The patient presents today for a 4-month follow-up.    HTN-bp on the low side today-The patient denies any lightheadedness or dizziness when he stands.    Iron def anemia-The patient states he has always had trouble with his iron. He states his father and brother had iron deficiency anemia. He states it has been about a year since he has been on iron.    The patient had a colonoscopy in 04/2022. He was told to repeat in 5 years. He was told to do a high fiber diet. He had one polyp and diverticulosis but not diverticulitis.    Past Medical History:   Diagnosis Date   • Anxiety    • Arthritis 20 years   • Asthma 5 years   • Cancer (HCC)    • Cardiomyopathy (HCC)    • Cataract 5years   • CHF (congestive heart failure) (HCC) 2015    Surgery   • Diverticulosis 20 years   • Eating disorder    • Erectile dysfunction 5 years   • Gastroesophageal reflux    • Gout    • Headache    • High cholesterol    • Hip pain    • Hoarseness, chronic    • Hypertension    • Hypothyroid    • AJ (iron deficiency anemia) 05/07/2015   • Limb pain    • Lumbago 09/03/2013    MRI shows abnormal appearance of bone marrow. Workup negative pet hem/onc   • Pancreatitis    • Peripheral vascular disease (HCC) 03/28/2015   • Pneumonia    • Sciatica 09/03/2013   • Tremor    • Visual impairment 2015    Surgery      Family History   Problem Relation Age of Onset   • Cancer Mother    • Heart disease Mother    • Hyperlipidemia Mother    • Hypertension Mother    • Cancer Father         Lung   • Arthritis Father    • Cancer Brother         Throat   • Cancer Other    • Heart failure Other    • Other Other         spine problems    • Cancer Brother         Brain   • Cancer Brother         Neck   • Thyroid disease Brother    • Malig Hyperthermia Neg Hx       Past Surgical  History:   Procedure Laterality Date   • COLONOSCOPY  2014    normal Dr. Bingham   • COLONOSCOPY N/A 04/27/2022    Procedure: COLONOSCOPY WITH POLYPECTOMY;  Surgeon: Juarez Seo MD;  Location: [facility] ENDOSCOPY;  Service: General;  Laterality: N/A;  DIVERTICULOSIS, COLON POLYP   • CORONARY STENT PLACEMENT  2015   • ENDOSCOPY N/A 04/27/2022    Procedure: ESOPHAGOGASTRODUODENOSCOPY WITH BIOPSIES AND POLYPECTOMY;  Surgeon: Juarez Seo MD;  Location: [facility] ENDOSCOPY;  Service: General;  Laterality: N/A;  GASTRIC POLYPS   • EYE SURGERY Right     cancer removed x 2   • LYMPH NODE BIOPSY     • MANDIBLE SURGERY      cancer removed   • SHOULDER SURGERY Right         Current Outpatient Medications:   •  albuterol sulfate  (90 Base) MCG/ACT inhaler, Inhale 1 puff Every 4 (Four) Hours As Needed for Shortness of Air., Disp: 6.7 g, Rfl: 2  •  allopurinol (ZYLOPRIM) 300 MG tablet, TAKE 1 TABLET BY MOUTH EVERY DAY, Disp: 90 tablet, Rfl: 1  •  amitriptyline (ELAVIL) 25 MG tablet, Take 1 tablet by mouth every night at bedtime., Disp: 90 tablet, Rfl: 1  •  aspirin 81 MG EC tablet, Take 81 mg by mouth Daily. Last dose Friday 4/22/22, Disp: , Rfl:   •  atorvastatin (LIPITOR) 80 MG tablet, TAKE 1 TABLET BY MOUTH EVERY DAY, Disp: 90 tablet, Rfl: 1  •  busPIRone (BUSPAR) 10 MG tablet, TAKE 1 TABLET BY MOUTH THREE TIMES A DAY, Disp: 270 tablet, Rfl: 1  •  Fluticasone-Umeclidin-Vilant (Trelegy Ellipta) 100-62.5-25 MCG/INH inhaler, Inhale 1 puff Daily. Indications: lot RR3V 2 boxes exp 12/23, Disp: 28 each, Rfl: 0  •  ibuprofen (ADVIL,MOTRIN) 800 MG tablet, Take 800 mg by mouth Every 6 (Six) Hours As Needed. for pain, Disp: , Rfl:   •  ketoconazole (NIZORAL) 2 % cream, Apply 1 application topically to the appropriate area as directed Daily., Disp: , Rfl:   •  ketoconazole (NIZORAL) 2 % shampoo, Apply 1 application topically to the appropriate area as directed 1 (One) Time Per Week., Disp: , Rfl:   •  lisinopril  "(PRINIVIL,ZESTRIL) 40 MG tablet, Take 1 tablet by mouth Daily., Disp: 90 tablet, Rfl: 1  •  meclizine (ANTIVERT) 25 MG tablet, Take 1 tablet by mouth 3 (Three) Times a Day As Needed for Nausea., Disp: 270 tablet, Rfl: 1  •  multivitamin with minerals tablet tablet, Take 1 tablet by mouth Daily., Disp: , Rfl:   •  omeprazole (priLOSEC) 20 MG capsule, TAKE 2 CAPSULES BY MOUTH EVERY DAY, Disp: 180 capsule, Rfl: 1  •  propranolol (INDERAL) 40 MG tablet, TAKE 1 TABLET BY MOUTH THREE TIMES A DAY, Disp: 270 tablet, Rfl: 1  •  spironolactone (ALDACTONE) 25 MG tablet, TAKE 1 TABLET BY MOUTH EVERY OTHER DAY, OR AS DIRECTED (Patient taking differently: Take 25 mg by mouth Daily.), Disp: 45 tablet, Rfl: 3  •  Synthroid 88 MCG tablet, Take 88 mcg by mouth., Disp: , Rfl:   •  Vascepa 1 g capsule capsule, TAKE 2 CAPS BY MOUTH TWICE DAILY WITH FOOD. DO NOT CHEW OR OPEN, Disp: 360 capsule, Rfl: 3    OBJECTIVE  Vital Signs:   /52 (BP Location: Left arm, Patient Position: Sitting, Cuff Size: Large Adult)   Pulse 60   Ht 182.9 cm (72\")   Wt 105 kg (230 lb 12.8 oz)   SpO2 100%   BMI 31.30 kg/m²    Estimated body mass index is 31.3 kg/m² as calculated from the following:    Height as of this encounter: 182.9 cm (72\").    Weight as of this encounter: 105 kg (230 lb 12.8 oz).     Wt Readings from Last 3 Encounters:   12/16/22 105 kg (230 lb 12.8 oz)   08/16/22 103 kg (226 lb)   06/22/22 104 kg (229 lb)     BP Readings from Last 3 Encounters:   12/16/22 108/52   08/16/22 126/61   06/22/22 102/54             Physical Exam  Constitutional:       Appearance: Normal appearance.   Neck:      Thyroid: No thyroid mass, thyromegaly or thyroid tenderness.      Vascular: No carotid bruit.   Cardiovascular:      Rate and Rhythm: Normal rate and regular rhythm.      Pulses: Normal pulses.      Heart sounds: Normal heart sounds.   Pulmonary:      Effort: Pulmonary effort is normal.      Breath sounds: Normal breath sounds.   Musculoskeletal: "      Right lower leg: No edema.      Left lower leg: No edema.   Skin:     General: Skin is warm and dry.   Neurological:      General: No focal deficit present.      Mental Status: He is alert and oriented to person, place, and time.   Psychiatric:         Mood and Affect: Mood normal.         Behavior: Behavior normal.          Result Review    CMP    CMP 1/12/22 4/20/22 12/19/22   Glucose 101 (A) 102 (A) 97   BUN 20 22 12   Creatinine 1.23 1.24 1.30 (A)   eGFR Non African Am 57 (A)     Sodium 140 139 137   Potassium 4.3 4.4 4.3   Chloride 102 104 102   Calcium 9.7 9.2 9.3   Albumin 4.70 4.30 4.30   Total Bilirubin 0.6 0.4 0.3   Alkaline Phosphatase 80 67 82   AST (SGOT) 17 15 21   ALT (SGPT) 14 13 15   (A) Abnormal value       Comments are available for some flowsheets but are not being displayed.           CBC    CBC 12/19/22   WBC 7.12   RBC 4.49   Hemoglobin 13.6   Hematocrit 40.3   MCV 89.8   MCH 30.3   MCHC 33.7   RDW 13.2   Platelets 180           CBC w/diff    CBC w/Diff 12/19/22   WBC 7.12   RBC 4.49   Hemoglobin 13.6   Hematocrit 40.3   MCV 89.8   MCH 30.3   MCHC 33.7   RDW 13.2   Platelets 180   Neutrophil Rel % 56.9   Immature Granulocyte Rel % 0.4   Lymphocyte Rel % 30.8   Monocyte Rel % 7.4   Eosinophil Rel % 3.9   Basophil Rel % 0.6           Lipid Panel    Lipid Panel 4/20/22 6/15/22 12/19/22   Total Cholesterol 134 150 143   Triglycerides 188 (A) 269 (A) 202 (A)   HDL Cholesterol 37 (A) 37 (A) 37 (A)   VLDL Cholesterol 31 43 (A) 34   LDL Cholesterol  66 70 72   LDL/HDL Ratio 1.61 1.60 1.77   (A) Abnormal value            TSH    TSH 4/20/22 12/19/22   TSH 1.910 1.200           Most Recent A1C    HGBA1C Most Recent 12/19/22   Hemoglobin A1C 5.70 (A)   (A) Abnormal value                PSA    PSA 4/20/22   PSA 0.706             US Thyroid    Result Date: 9/24/2022    The thyroid gland is overall smaller in size than on the prior study from 5/19/2017.  The diffuse heterogeneity of the thyroid  echotexture has decreased considerably.  No suspicious thyroid nodules are identified.     Please note that portions of this note were completed with a voice recognition program.  MAGO OSBORNE JR, MD       Electronically Signed and Approved By: MAGO OSBORNE JR, MD on 9/24/2022 at 19:13                 The above data has been reviewed by YASH Castro  12/16/2022 09:24 EST.          Patient Care Team:  Concetta Carson APRN as PCP - General (Nurse Practitioner)  Marina Martin APRN as Nurse Practitioner (Nurse Practitioner)           ASSESSMENT & PLAN    Diagnoses and all orders for this visit:    1. Iron deficiency anemia, unspecified iron deficiency anemia type (Primary)  -     CBC & Differential; Future  -     Iron Profile; Future  -     Ferritin; Future    2. High cholesterol  -     Lipid Panel; Future    3. Hypertension, unspecified type  -     Comprehensive Metabolic Panel; Future  -     Urinalysis With Culture If Indicated -; Future    4. Hypothyroidism, unspecified type  -     TSH; Future    5. Impaired fasting glucose  -     Hemoglobin A1c; Future         Tobacco Use: Medium Risk   • Smoking Tobacco Use: Former   • Smokeless Tobacco Use: Never   • Passive Exposure: Not on file       Follow Up     Return in about 4 months (around 4/16/2023).      Patient was given instructions and counseling regarding his condition or for health maintenance advice. Please see specific information pulled into the AVS if appropriate.   I have reviewed information obtained and documented by others and I have confirmed the accuracy of this documented note.    YASH Castro       Transcribed from ambient dictation for YASH Castro by Phyllis Steiner.  12/16/22   10:27 EST    Patient or patient representative verbalized consent to the visit recording.  I have personally performed the services described in this document as transcribed by the above individual, and it is both accurate  and complete.

## 2022-12-19 ENCOUNTER — LAB (OUTPATIENT)
Dept: LAB | Facility: HOSPITAL | Age: 77
End: 2022-12-19

## 2022-12-19 DIAGNOSIS — I42.9 CARDIOMYOPATHY, UNSPECIFIED TYPE: Chronic | ICD-10-CM

## 2022-12-19 DIAGNOSIS — E78.00 HIGH CHOLESTEROL: ICD-10-CM

## 2022-12-19 DIAGNOSIS — I10 PRIMARY HYPERTENSION: ICD-10-CM

## 2022-12-19 DIAGNOSIS — D50.9 IRON DEFICIENCY ANEMIA, UNSPECIFIED IRON DEFICIENCY ANEMIA TYPE: ICD-10-CM

## 2022-12-19 DIAGNOSIS — R73.01 IMPAIRED FASTING GLUCOSE: ICD-10-CM

## 2022-12-19 DIAGNOSIS — E03.9 HYPOTHYROIDISM, UNSPECIFIED TYPE: ICD-10-CM

## 2022-12-19 DIAGNOSIS — I10 HYPERTENSION, UNSPECIFIED TYPE: ICD-10-CM

## 2022-12-19 DIAGNOSIS — E78.2 MIXED HYPERLIPIDEMIA: ICD-10-CM

## 2022-12-19 LAB
ALBUMIN SERPL-MCNC: 4.3 G/DL (ref 3.5–5.2)
ALBUMIN/GLOB SERPL: 1.9 G/DL
ALP SERPL-CCNC: 82 U/L (ref 39–117)
ALT SERPL W P-5'-P-CCNC: 15 U/L (ref 1–41)
ANION GAP SERPL CALCULATED.3IONS-SCNC: 7.4 MMOL/L (ref 5–15)
AST SERPL-CCNC: 21 U/L (ref 1–40)
BACTERIA UR QL AUTO: ABNORMAL /HPF
BASOPHILS # BLD AUTO: 0.04 10*3/MM3 (ref 0–0.2)
BASOPHILS NFR BLD AUTO: 0.6 % (ref 0–1.5)
BILIRUB SERPL-MCNC: 0.3 MG/DL (ref 0–1.2)
BILIRUB UR QL STRIP: NEGATIVE
BUN SERPL-MCNC: 12 MG/DL (ref 8–23)
BUN/CREAT SERPL: 9.2 (ref 7–25)
CALCIUM SPEC-SCNC: 9.3 MG/DL (ref 8.6–10.5)
CHLORIDE SERPL-SCNC: 102 MMOL/L (ref 98–107)
CHOLEST SERPL-MCNC: 143 MG/DL (ref 0–200)
CLARITY UR: CLEAR
CO2 SERPL-SCNC: 27.6 MMOL/L (ref 22–29)
COLOR UR: YELLOW
CREAT SERPL-MCNC: 1.3 MG/DL (ref 0.76–1.27)
DEPRECATED RDW RBC AUTO: 43.3 FL (ref 37–54)
EGFRCR SERPLBLD CKD-EPI 2021: 56.9 ML/MIN/1.73
EOSINOPHIL # BLD AUTO: 0.28 10*3/MM3 (ref 0–0.4)
EOSINOPHIL NFR BLD AUTO: 3.9 % (ref 0.3–6.2)
ERYTHROCYTE [DISTWIDTH] IN BLOOD BY AUTOMATED COUNT: 13.2 % (ref 12.3–15.4)
FERRITIN SERPL-MCNC: 513 NG/ML (ref 30–400)
GLOBULIN UR ELPH-MCNC: 2.3 GM/DL
GLUCOSE SERPL-MCNC: 97 MG/DL (ref 65–99)
GLUCOSE UR STRIP-MCNC: NEGATIVE MG/DL
HBA1C MFR BLD: 5.7 % (ref 4.8–5.6)
HCT VFR BLD AUTO: 40.3 % (ref 37.5–51)
HDLC SERPL-MCNC: 37 MG/DL (ref 40–60)
HGB BLD-MCNC: 13.6 G/DL (ref 13–17.7)
HGB UR QL STRIP.AUTO: NEGATIVE
HYALINE CASTS UR QL AUTO: ABNORMAL /LPF
IMM GRANULOCYTES # BLD AUTO: 0.03 10*3/MM3 (ref 0–0.05)
IMM GRANULOCYTES NFR BLD AUTO: 0.4 % (ref 0–0.5)
IRON 24H UR-MRATE: 57 MCG/DL (ref 59–158)
IRON SATN MFR SERPL: 23 % (ref 20–50)
KETONES UR QL STRIP: NEGATIVE
LDLC SERPL CALC-MCNC: 72 MG/DL (ref 0–100)
LDLC/HDLC SERPL: 1.77 {RATIO}
LEUKOCYTE ESTERASE UR QL STRIP.AUTO: ABNORMAL
LYMPHOCYTES # BLD AUTO: 2.19 10*3/MM3 (ref 0.7–3.1)
LYMPHOCYTES NFR BLD AUTO: 30.8 % (ref 19.6–45.3)
MAGNESIUM SERPL-MCNC: 1.6 MG/DL (ref 1.6–2.4)
MCH RBC QN AUTO: 30.3 PG (ref 26.6–33)
MCHC RBC AUTO-ENTMCNC: 33.7 G/DL (ref 31.5–35.7)
MCV RBC AUTO: 89.8 FL (ref 79–97)
MONOCYTES # BLD AUTO: 0.53 10*3/MM3 (ref 0.1–0.9)
MONOCYTES NFR BLD AUTO: 7.4 % (ref 5–12)
NEUTROPHILS NFR BLD AUTO: 4.05 10*3/MM3 (ref 1.7–7)
NEUTROPHILS NFR BLD AUTO: 56.9 % (ref 42.7–76)
NITRITE UR QL STRIP: NEGATIVE
NRBC BLD AUTO-RTO: 0 /100 WBC (ref 0–0.2)
PH UR STRIP.AUTO: 6 [PH] (ref 5–8)
PLATELET # BLD AUTO: 180 10*3/MM3 (ref 140–450)
PMV BLD AUTO: 10.7 FL (ref 6–12)
POTASSIUM SERPL-SCNC: 4.3 MMOL/L (ref 3.5–5.2)
PROT SERPL-MCNC: 6.6 G/DL (ref 6–8.5)
PROT UR QL STRIP: ABNORMAL
RBC # BLD AUTO: 4.49 10*6/MM3 (ref 4.14–5.8)
RBC # UR STRIP: ABNORMAL /HPF
REF LAB TEST METHOD: ABNORMAL
SODIUM SERPL-SCNC: 137 MMOL/L (ref 136–145)
SP GR UR STRIP: 1.02 (ref 1–1.03)
SQUAMOUS #/AREA URNS HPF: ABNORMAL /HPF
TIBC SERPL-MCNC: 250 MCG/DL (ref 298–536)
TRANSFERRIN SERPL-MCNC: 168 MG/DL (ref 200–360)
TRIGL SERPL-MCNC: 202 MG/DL (ref 0–150)
TSH SERPL DL<=0.05 MIU/L-ACNC: 1.2 UIU/ML (ref 0.27–4.2)
UROBILINOGEN UR QL STRIP: ABNORMAL
VLDLC SERPL-MCNC: 34 MG/DL (ref 5–40)
WBC # UR STRIP: ABNORMAL /HPF
WBC NRBC COR # BLD: 7.12 10*3/MM3 (ref 3.4–10.8)

## 2022-12-19 PROCEDURE — 85025 COMPLETE CBC W/AUTO DIFF WBC: CPT

## 2022-12-19 PROCEDURE — 80061 LIPID PANEL: CPT

## 2022-12-19 PROCEDURE — 83036 HEMOGLOBIN GLYCOSYLATED A1C: CPT

## 2022-12-19 PROCEDURE — 83735 ASSAY OF MAGNESIUM: CPT

## 2022-12-19 PROCEDURE — 80053 COMPREHEN METABOLIC PANEL: CPT

## 2022-12-19 PROCEDURE — 36415 COLL VENOUS BLD VENIPUNCTURE: CPT

## 2022-12-19 PROCEDURE — 83540 ASSAY OF IRON: CPT

## 2022-12-19 PROCEDURE — 82728 ASSAY OF FERRITIN: CPT

## 2022-12-19 PROCEDURE — 84443 ASSAY THYROID STIM HORMONE: CPT

## 2022-12-19 PROCEDURE — 84466 ASSAY OF TRANSFERRIN: CPT

## 2022-12-19 PROCEDURE — 81001 URINALYSIS AUTO W/SCOPE: CPT

## 2022-12-21 DIAGNOSIS — B37.42 CANDIDAL BALANITIS: Primary | ICD-10-CM

## 2022-12-21 RX ORDER — CLOTRIMAZOLE AND BETAMETHASONE DIPROPIONATE 10; .64 MG/G; MG/G
1 CREAM TOPICAL 2 TIMES DAILY
Qty: 45 G | Refills: 0 | Status: SHIPPED | OUTPATIENT
Start: 2022-12-21

## 2023-01-07 DIAGNOSIS — F41.9 ANXIETY: ICD-10-CM

## 2023-01-09 RX ORDER — AMITRIPTYLINE HYDROCHLORIDE 25 MG/1
TABLET, FILM COATED ORAL
Qty: 90 TABLET | Refills: 1 | Status: SHIPPED | OUTPATIENT
Start: 2023-01-09

## 2023-02-04 DIAGNOSIS — I10 HYPERTENSION, UNSPECIFIED TYPE: ICD-10-CM

## 2023-02-06 RX ORDER — LISINOPRIL 40 MG/1
TABLET ORAL
Qty: 90 TABLET | Refills: 1 | Status: SHIPPED | OUTPATIENT
Start: 2023-02-06

## 2023-02-08 ENCOUNTER — OFFICE VISIT (OUTPATIENT)
Dept: CARDIOLOGY | Facility: CLINIC | Age: 78
End: 2023-02-08
Payer: MEDICARE

## 2023-02-08 VITALS
HEART RATE: 61 BPM | WEIGHT: 230 LBS | SYSTOLIC BLOOD PRESSURE: 128 MMHG | DIASTOLIC BLOOD PRESSURE: 69 MMHG | HEIGHT: 72 IN | BODY MASS INDEX: 31.15 KG/M2

## 2023-02-08 DIAGNOSIS — Z86.79 HISTORY OF CARDIOMYOPATHY: Primary | ICD-10-CM

## 2023-02-08 DIAGNOSIS — I10 ESSENTIAL HYPERTENSION: ICD-10-CM

## 2023-02-08 DIAGNOSIS — E78.5 HYPERLIPIDEMIA LDL GOAL <70: ICD-10-CM

## 2023-02-08 PROCEDURE — 99214 OFFICE O/P EST MOD 30 MIN: CPT | Performed by: INTERNAL MEDICINE

## 2023-02-08 RX ORDER — ICOSAPENT ETHYL 1000 MG/1
2 CAPSULE ORAL 2 TIMES DAILY WITH MEALS
Qty: 360 CAPSULE | Refills: 3 | Status: SHIPPED | OUTPATIENT
Start: 2023-02-08

## 2023-02-08 RX ORDER — SPIRONOLACTONE 25 MG/1
25 TABLET ORAL DAILY
Qty: 90 TABLET | Refills: 3 | Status: SHIPPED | OUTPATIENT
Start: 2023-02-08

## 2023-02-08 NOTE — PROGRESS NOTES
Chief Complaint  Follow-up, Hypertension, Cardiomyopathy, Hyperlipidemia, and Congestive Heart Failure    Subjective            Asaf Chilel presents to [facility] CARDIOLOGY  History of Present Illness  Mr. Chilel was previously followed by Dr. Duvall and presents to establish care with me today:  -He has a previous history of cardiomyopathy with significant LV dysfunction per Dr. Duvall's old notes, but he was started on appropriate medical therapy and his EF had normalized to 50% per his last echo in 2021.  He was noted to have grade I diastolic dysfunction (age appropriate) on that study, but no hemodynamically significant valvular disease was observed.  -Mr. Chilel reports he is tolerating all his medications well and reports excellent compliance with them.  He needs refills on his chronic Aldactone and Vascepa prescriptions today.  -His BP was well controlled at 128/69 in the office today and he reports similar readings when he occasionally checks his BP at home.  -No episodes of chest pain/pressure, palpitations/tachycardia, orthopnea, PND, peripheral edema, lightheadedness, or syncope reported.  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  -His last LDL level (obtained at [facility] last month) = 48  -He has no previous documented instances of hyperkalemia at all despite chronic use of both lisinopril and spironolactone.    Past Medical History:   Diagnosis Date   • Anxiety    • Arthritis 20 years   • Asthma 5 years   • Cancer (HCC)    • Cardiomyopathy (HCC)    • Cataract 5years   • CHF (congestive heart failure) ([facility]) 2015    Surgery   • Diverticulosis 20 years   • Eating disorder    • Erectile dysfunction 5 years   • Gastroesophageal reflux    • Gout    • Headache    •  High cholesterol    • Hip pain    • Hoarseness, chronic    • Hypertension    • Hypothyroid    • AJ (iron deficiency anemia) 2015   • Limb pain    • Lumbago 2013    MRI shows abnormal appearance of bone marrow. Workup negative pet hem/onc   • Pancreatitis    • Peripheral vascular disease (HCC) 2015   • Pneumonia    • Sciatica 2013   • Tremor    • Visual impairment 2015    Surgery       Past Surgical History:   • COLONOSCOPY    normal Dr. Bingham   • COLONOSCOPY    Procedure: COLONOSCOPY WITH POLYPECTOMY;  Surgeon: Juarez Seo MD;  Location: [facility] ENDOSCOPY;  Service: General;  Laterality: N/A;  DIVERTICULOSIS, COLON POLYP   • CORONARY STENT PLACEMENT   • ENDOSCOPY    Procedure: ESOPHAGOGASTRODUODENOSCOPY WITH BIOPSIES AND POLYPECTOMY;  Surgeon: Juarez Seo MD;  Location: [facility] ENDOSCOPY;  Service: General;  Laterality: N/A;  GASTRIC POLYPS   • EYE SURGERY    cancer removed x 2   • LYMPH NODE BIOPSY   • MANDIBLE SURGERY    cancer removed   • SHOULDER SURGERY       Social History     Tobacco Use   • Smoking status: Former     Packs/day: 1.00     Years: 44.00     Pack years: 44.00     Types: Cigarettes     Start date:      Quit date:      Years since quittin.1   • Smokeless tobacco: Never   Vaping Use   • Vaping Use: Never used   Substance Use Topics   • Alcohol use: Not Currently     Comment: Quit    • Drug use: Never       Family History   Problem Relation Age of Onset   • Cancer Mother    • Heart disease Mother    • Hyperlipidemia Mother    • Hypertension Mother    • Cancer Father         Lung   • Arthritis Father    • Cancer Brother         Throat   • Cancer Other    • Heart failure Other    • Other Other         spine problems    • Cancer Brother         Brain   • Cancer Brother         Neck   • Thyroid disease Brother    • Malig Hyperthermia Neg Hx         Current Outpatient Medications on File Prior to Visit   Medication Sig   • albuterol sulfate  (90  Base) MCG/ACT inhaler Inhale 1 puff Every 4 (Four) Hours As Needed for Shortness of Air.   • allopurinol (ZYLOPRIM) 300 MG tablet TAKE 1 TABLET BY MOUTH EVERY DAY   • amitriptyline (ELAVIL) 25 MG tablet TAKE 1 TABLET BY MOUTH EVERYDAY AT BEDTIME   • aspirin 81 MG EC tablet Take 81 mg by mouth Daily. Last dose Friday 4/22/22   • atorvastatin (LIPITOR) 80 MG tablet TAKE 1 TABLET BY MOUTH EVERY DAY   • busPIRone (BUSPAR) 10 MG tablet TAKE 1 TABLET BY MOUTH THREE TIMES A DAY   • clotrimazole-betamethasone (Lotrisone) 1-0.05 % cream Apply 1 application topically to the appropriate area as directed 2 (Two) Times a Day.   • Fluticasone-Umeclidin-Vilant (Trelegy Ellipta) 100-62.5-25 MCG/INH inhaler Inhale 1 puff Daily. Indications: lot RR3V 2 boxes exp 12/23   • ketoconazole (NIZORAL) 2 % cream Apply 1 application topically to the appropriate area as directed Daily.   • ketoconazole (NIZORAL) 2 % shampoo Apply 1 application topically to the appropriate area as directed 1 (One) Time Per Week.   • lisinopril (PRINIVIL,ZESTRIL) 40 MG tablet TAKE 1 TABLET BY MOUTH EVERY DAY   • meclizine (ANTIVERT) 25 MG tablet Take 1 tablet by mouth 3 (Three) Times a Day As Needed for Nausea.   • multivitamin with minerals tablet tablet Take 1 tablet by mouth Daily.   • omeprazole (priLOSEC) 20 MG capsule TAKE 2 CAPSULES BY MOUTH EVERY DAY   • propranolol (INDERAL) 40 MG tablet TAKE 1 TABLET BY MOUTH THREE TIMES A DAY   • spironolactone (ALDACTONE) 25 MG tablet TAKE 1 TABLET BY MOUTH EVERY OTHER DAY, OR AS DIRECTED (Patient taking differently: Take 25 mg by mouth Daily.)   • Synthroid 88 MCG tablet Take 88 mcg by mouth.   • Vascepa 1 g capsule capsule TAKE 2 CAPS BY MOUTH TWICE DAILY WITH FOOD. DO NOT CHEW OR OPEN   • [DISCONTINUED] ibuprofen (ADVIL,MOTRIN) 800 MG tablet Take 800 mg by mouth Every 6 (Six) Hours As Needed. for pain     No current facility-administered medications on file prior to visit.       No Known Allergies    Review of  "Systems   Constitutional: Negative for activity change, chills and fever.   HENT: Negative for hearing loss, nosebleeds and sore throat.    Eyes: Negative for pain and visual disturbance.   Respiratory: Positive for shortness of breath. Negative for apnea, cough, chest tightness and wheezing.    Cardiovascular: Negative for chest pain, palpitations and leg swelling.   Gastrointestinal: Negative for abdominal pain, blood in stool and vomiting.   Genitourinary: Positive for nocturia. Negative for dysuria and hematuria.   Musculoskeletal: Positive for arthralgias. Negative for joint swelling and myalgias.   Skin: Negative for color change, pallor and rash.   Neurological: Negative for tremors, syncope, light-headedness and headache.   Hematological: Negative for adenopathy. Does not bruise/bleed easily.        Objective     /69 (BP Location: Left arm, Patient Position: Sitting, Cuff Size: Adult)   Pulse 61   Ht 182.9 cm (72\")   Wt 104 kg (230 lb)   BMI 31.19 kg/m²       Physical Exam  Constitutional:       General: He is not in acute distress.     Appearance: Normal appearance.   HENT:      Head: Atraumatic.      Mouth/Throat:      Mouth: Mucous membranes are moist.      Pharynx: Oropharynx is clear. No oropharyngeal exudate.   Eyes:      General: No scleral icterus.     Conjunctiva/sclera: Conjunctivae normal.   Neck:      Vascular: No carotid bruit or JVD.   Cardiovascular:      Rate and Rhythm: Normal rate and regular rhythm.  No extrasystoles are present.     Chest Wall: PMI is not displaced.      Pulses:           Radial pulses are 2+ on the right side and 2+ on the left side.      Heart sounds: S1 normal and S2 normal. Murmur heard.    Systolic murmur is present with a grade of 1/6.    No friction rub. No gallop. No S3 sounds.      Comments: Mild systolic murmur at RUSB without radiation to the carotids.  Pulmonary:      Effort: Pulmonary effort is normal. No tachypnea or respiratory distress.      " Breath sounds: No decreased breath sounds, wheezing, rhonchi or rales.   Chest:      Chest wall: No tenderness.   Abdominal:      General: Bowel sounds are normal. There is no distension.      Palpations: Abdomen is soft. There is no mass.      Tenderness: There is no abdominal tenderness.      Comments: Obese.   Musculoskeletal:         General: No swelling, tenderness or deformity.      Cervical back: Neck supple. No tenderness.      Right lower leg: No edema.      Left lower leg: No edema.   Skin:     General: Skin is warm and dry.      Coloration: Skin is not jaundiced.      Findings: No erythema or rash.      Nails: There is no clubbing.   Neurological:      General: No focal deficit present.      Mental Status: He is alert and oriented to person, place, and time.      Motor: No weakness.   Psychiatric:         Mood and Affect: Mood normal.         Behavior: Behavior normal.       Result Review :     The following data was reviewed by: Antony Carlton MD on 02/08/2023:    CMP    CMP 4/20/22 12/19/22   Glucose 102 (A) 97   BUN 22 12   Creatinine 1.24 1.30 (A)   eGFR 60.6 56.9 (A)   Sodium 139 137   Potassium 4.4 4.3   Chloride 104 102   Calcium 9.2 9.3   Total Protein 6.8 6.6   Albumin 4.30 4.30   Globulin 2.5 2.3   Total Bilirubin 0.4 0.3   Alkaline Phosphatase 67 82   AST (SGOT) 15 21   ALT (SGPT) 13 15   Albumin/Globulin Ratio 1.7 1.9   BUN/Creatinine Ratio 17.7 9.2   Anion Gap 13.3 7.4   (A) Abnormal value       Comments are available for some flowsheets but are not being displayed.           CBC    CBC 12/19/22   WBC 7.12   RBC 4.49   Hemoglobin 13.6   Hematocrit 40.3   MCV 89.8   MCH 30.3   MCHC 33.7   RDW 13.2   Platelets 180           Lipid Panel    Lipid Panel 4/20/22 6/15/22 12/19/22   Total Cholesterol 134 150 143   Triglycerides 188 (A) 269 (A) 202 (A)   HDL Cholesterol 37 (A) 37 (A) 37 (A)   VLDL Cholesterol 31 43 (A) 34   LDL Cholesterol  66 70 72   LDL/HDL Ratio 1.61 1.60 1.77   (A) Abnormal  value            Labs from 1/30/23 at [facility]:   Total Cholesterol = 143  Triglycerides = 304  HDL = 34  LDL = 48    Echocardiogram 1/14/21:  CONCLUSION:  1.  Left ventricular chamber size is normal. The left ventricular wall thickness is normal. The left ventricular        systolic function is mildly reduced with an estimated EF of 50%. No significant regional wall motion        abnormalities are identified.   2.  Left ventricular diastolic dysfunction.  3.  Fibrocalcific changes noted of the aortic valve without significant stenosis.        Assessment and Plan      Diagnoses and all orders for this visit:    1. History of cardiomyopathy (Primary)  -     spironolactone (ALDACTONE) 25 MG tablet; Take 1 tablet by mouth Daily.  Dispense: 90 tablet; Refill: 3    2. Hyperlipidemia LDL goal <70  -     icosapent ethyl (Vascepa) 1 g capsule capsule; Take 2 g by mouth 2 (Two) Times a Day With Meals.  Dispense: 360 capsule; Refill: 3    3. Essential hypertension  -     spironolactone (ALDACTONE) 25 MG tablet; Take 1 tablet by mouth Daily.  Dispense: 90 tablet; Refill: 3    -Continue current medications; appropriate medication refills provided were today, as noted above  -Follow-up in Cardiology Clinic in 6 months  -I recommended aerobic exercise for at least 20-25 minutes 4-5 times weekly, as well as maintenance of a less than 2 grams of sodium (daily) diet.  He will monitor his weight at least a couple times per week.         Follow Up     Return in about 6 months (around 8/8/2023) for Next scheduled follow up, with YASH Rao.    Patient was given instructions and counseling regarding his condition or for health maintenance advice. Please see specific information pulled into the AVS if appropriate.     Asaf Gilmore Ranjana  reports that he quit smoking about 23 years ago. His smoking use included cigarettes. He started smoking about 67 years ago. He has a 44.00 pack-year smoking history. He has never  used smokeless tobacco.  I have educated him on the risk of diseases from using tobacco products such as cancer, COPD and heart disease.  Continued cessation was advised.      Antony Carlton MD, FACC, Psychiatric  Interventional Cardiology

## 2023-03-15 DIAGNOSIS — K21.9 GASTROESOPHAGEAL REFLUX DISEASE, UNSPECIFIED WHETHER ESOPHAGITIS PRESENT: ICD-10-CM

## 2023-03-15 RX ORDER — OMEPRAZOLE 20 MG/1
CAPSULE, DELAYED RELEASE ORAL
Qty: 180 CAPSULE | Refills: 1 | Status: SHIPPED | OUTPATIENT
Start: 2023-03-15

## 2023-05-03 DIAGNOSIS — F41.9 ANXIETY: ICD-10-CM

## 2023-05-04 RX ORDER — BUSPIRONE HYDROCHLORIDE 10 MG/1
TABLET ORAL
Qty: 90 TABLET | Refills: 0 | Status: SHIPPED | OUTPATIENT
Start: 2023-05-04

## 2023-05-11 ENCOUNTER — OFFICE VISIT (OUTPATIENT)
Dept: FAMILY MEDICINE CLINIC | Facility: CLINIC | Age: 78
End: 2023-05-11
Payer: MEDICARE

## 2023-05-11 VITALS
OXYGEN SATURATION: 98 % | SYSTOLIC BLOOD PRESSURE: 113 MMHG | BODY MASS INDEX: 31.02 KG/M2 | WEIGHT: 229 LBS | DIASTOLIC BLOOD PRESSURE: 73 MMHG | HEIGHT: 72 IN | HEART RATE: 60 BPM

## 2023-05-11 DIAGNOSIS — E78.1 HYPERTRIGLYCERIDEMIA: ICD-10-CM

## 2023-05-11 DIAGNOSIS — R06.02 SHORTNESS OF BREATH: ICD-10-CM

## 2023-05-11 DIAGNOSIS — D64.9 ANEMIA, UNSPECIFIED TYPE: ICD-10-CM

## 2023-05-11 DIAGNOSIS — E78.5 HYPERLIPIDEMIA, UNSPECIFIED HYPERLIPIDEMIA TYPE: ICD-10-CM

## 2023-05-11 DIAGNOSIS — J44.9 CHRONIC OBSTRUCTIVE PULMONARY DISEASE, UNSPECIFIED COPD TYPE: ICD-10-CM

## 2023-05-11 DIAGNOSIS — K21.9 GASTROESOPHAGEAL REFLUX DISEASE WITHOUT ESOPHAGITIS: ICD-10-CM

## 2023-05-11 DIAGNOSIS — F41.9 ANXIETY: ICD-10-CM

## 2023-05-11 DIAGNOSIS — I10 PRIMARY HYPERTENSION: ICD-10-CM

## 2023-05-11 DIAGNOSIS — Z12.5 SCREENING FOR PROSTATE CANCER: ICD-10-CM

## 2023-05-11 DIAGNOSIS — R42 VERTIGO: ICD-10-CM

## 2023-05-11 DIAGNOSIS — M10.9 GOUT, UNSPECIFIED CAUSE, UNSPECIFIED CHRONICITY, UNSPECIFIED SITE: ICD-10-CM

## 2023-05-11 DIAGNOSIS — E03.9 HYPOTHYROIDISM, UNSPECIFIED TYPE: Primary | ICD-10-CM

## 2023-05-11 DIAGNOSIS — R73.01 IMPAIRED FASTING GLUCOSE: ICD-10-CM

## 2023-05-11 NOTE — PROGRESS NOTES
Chief Complaint  Gout, Anxiety, Hyperlipidemia, Hypertension, Heartburn, and Hypothyroidism, COPD    Subjective            Asaf Chilel presents to [facility]  History of Present Illness  Pt is here to establish care from [facility]. Pt is a f/u for gout, GERD, HLD, HTN, anxiety, and hypothyroidism, COPD. No issues or concerns at this time.     Pt would like to discuss getting samples of the Trelegy inhalers.     Pt is due labs.    Pt is followed by Dr. Carlton for cardiology.    Pt was followed by Dr. Betancur for Endocrinology for hypothroidism. Pt has been released to PRN.     MAW due 8/17/23        Past Medical History:   Diagnosis Date   • Anxiety    • Arthritis 20 years   • Asthma 5 years   • Cancer    • Cardiomyopathy    • Cataract 5years   • CHF (congestive heart failure) 2015    Surgery   • Diverticulosis 20 years   • Eating disorder    • Erectile dysfunction 5 years   • Gastroesophageal reflux    • Gout    • Headache    • High cholesterol    • Hip pain    • Hoarseness, chronic    • Hypertension    • Hypothyroid    • AJ (iron deficiency anemia) 05/07/2015   • Limb pain    • Lumbago 09/03/2013    MRI shows abnormal appearance of bone marrow. Workup negative pet hem/onc   • Pancreatitis    • Peripheral vascular disease 03/28/2015   • Pneumonia    • Sciatica 09/03/2013   • Tremor    • Visual impairment 2015    Surgery       No Known Allergies     Past Surgical History:   Procedure Laterality Date   • COLONOSCOPY  2014    normal Dr. Bingham   • COLONOSCOPY N/A 04/27/2022    Procedure: COLONOSCOPY WITH POLYPECTOMY;  Surgeon: Juarez Seo MD;  Location: [facility] ENDOSCOPY;  Service: General;  Laterality: N/A;  DIVERTICULOSIS, COLON POLYP   • CORONARY STENT PLACEMENT  2015   • ENDOSCOPY N/A 04/27/2022    Procedure: ESOPHAGOGASTRODUODENOSCOPY WITH BIOPSIES AND POLYPECTOMY;  Surgeon: Juarez Seo MD;  Location: [facility] ENDOSCOPY;  Service: General;  Laterality: N/A;   GASTRIC POLYPS   • EYE SURGERY Right     cancer removed x 2   • LYMPH NODE BIOPSY     • MANDIBLE SURGERY      cancer removed   • SHOULDER SURGERY Right         Social History     Tobacco Use   • Smoking status: Former     Packs/day: 1.00     Years: 44.00     Pack years: 44.00     Types: Cigarettes     Start date:      Quit date:      Years since quittin.3   • Smokeless tobacco: Never   Substance Use Topics   • Alcohol use: Not Currently     Comment: Quit        Family History   Problem Relation Age of Onset   • Cancer Mother    • Heart disease Mother    • Hyperlipidemia Mother    • Hypertension Mother    • Cancer Father         Lung   • Arthritis Father    • Cancer Brother         Throat   • Cancer Other    • Heart failure Other    • Other Other         spine problems    • Cancer Brother         Brain   • Cancer Brother         Neck   • Thyroid disease Brother    • Malig Hyperthermia Neg Hx         Current Outpatient Medications on File Prior to Visit   Medication Sig   • albuterol sulfate  (90 Base) MCG/ACT inhaler Inhale 1 puff Every 4 (Four) Hours As Needed for Shortness of Air.   • allopurinol (ZYLOPRIM) 300 MG tablet TAKE 1 TABLET BY MOUTH EVERY DAY   • amitriptyline (ELAVIL) 25 MG tablet TAKE 1 TABLET BY MOUTH EVERYDAY AT BEDTIME   • aspirin 81 MG EC tablet Take 1 tablet by mouth Daily. Last dose 22   • atorvastatin (LIPITOR) 80 MG tablet TAKE 1 TABLET BY MOUTH EVERY DAY   • busPIRone (BUSPAR) 10 MG tablet TAKE 1 TABLET BY MOUTH THREE TIMES A DAY   • clotrimazole-betamethasone (Lotrisone) 1-0.05 % cream Apply 1 application topically to the appropriate area as directed 2 (Two) Times a Day.   • icosapent ethyl (Vascepa) 1 g capsule capsule Take 2 g by mouth 2 (Two) Times a Day With Meals.   • ketoconazole (NIZORAL) 2 % shampoo Apply 1 application topically to the appropriate area as directed 1 (One) Time Per Week.   • lisinopril (PRINIVIL,ZESTRIL) 40 MG tablet TAKE 1 TABLET BY  "MOUTH EVERY DAY   • meclizine (ANTIVERT) 25 MG tablet Take 1 tablet by mouth 3 (Three) Times a Day As Needed for Nausea.   • multivitamin with minerals tablet tablet Take 1 tablet by mouth Daily.   • omeprazole (priLOSEC) 20 MG capsule TAKE 2 CAPSULES BY MOUTH EVERY DAY   • propranolol (INDERAL) 40 MG tablet TAKE 1 TABLET BY MOUTH THREE TIMES A DAY   • spironolactone (ALDACTONE) 25 MG tablet Take 1 tablet by mouth Daily.   • Synthroid 88 MCG tablet Take 1 tablet by mouth.   • [DISCONTINUED] Fluticasone-Umeclidin-Vilant (Trelegy Ellipta) 100-62.5-25 MCG/INH inhaler Inhale 1 puff Daily. Indications: lot [id number] 2 boxes exp 12/23   • [DISCONTINUED] ketoconazole (NIZORAL) 2 % cream Apply 1 application topically to the appropriate area as directed Daily. (Patient not taking: Reported on 5/11/2023)     No current facility-administered medications on file prior to visit.       Health Maintenance Due   Topic Date Due   • URINE MICROALBUMIN  10/24/2021       Objective     /73   Pulse 60   Ht 182.9 cm (72\")   Wt 104 kg (229 lb)   SpO2 98%   BMI 31.06 kg/m²       Physical Exam  Constitutional:       General: He is not in acute distress.     Appearance: Normal appearance. He is not ill-appearing.   HENT:      Head: Normocephalic and atraumatic.      Right Ear: Tympanic membrane, ear canal and external ear normal.      Left Ear: Tympanic membrane, ear canal and external ear normal.      Nose: Nose normal.   Cardiovascular:      Rate and Rhythm: Normal rate and regular rhythm.      Heart sounds: Normal heart sounds. No murmur heard.  Pulmonary:      Effort: Pulmonary effort is normal. No respiratory distress.      Breath sounds: Normal breath sounds.   Chest:      Chest wall: No tenderness.   Abdominal:      General: Abdomen is flat. Bowel sounds are normal. There is no distension.      Palpations: Abdomen is soft. There is no mass.      Tenderness: There is no abdominal tenderness. There is no guarding. "   Musculoskeletal:         General: No swelling or tenderness. Normal range of motion.      Cervical back: Normal range of motion and neck supple.   Skin:     General: Skin is warm and dry.      Findings: No rash.   Neurological:      General: No focal deficit present.      Mental Status: He is alert and oriented to person, place, and time. Mental status is at baseline.      Gait: Gait normal.   Psychiatric:         Attention and Perception: Attention normal.         Mood and Affect: Mood normal.         Speech: Speech normal.         Behavior: Behavior normal. Behavior is cooperative.         Thought Content: Thought content normal. Thought content does not include suicidal ideation.         Judgment: Judgment normal.           Result Review :                           Assessment and Plan        Diagnoses and all orders for this visit:    1. Hypothyroidism, unspecified type (Primary)  Comments:  stable on synthroid 88mcg, continue    2. Anxiety  Comments:  stable on 25mg elavil and buspar 10mg, continue  Orders:  -     Uric acid; Future    3. Gout, unspecified cause, unspecified chronicity, unspecified site  Comments:  stable on allopurinol 300mg, continue    4. Hyperlipidemia, unspecified hyperlipidemia type  Comments:  stable on lipitor 80mg, continue  Orders:  -     Lipid panel; Future  -     Comprehensive metabolic panel; Future    5. Hypertriglyceridemia  Comments:  stable on vasepa 1gm, continue    6. Primary hypertension  Comments:  stable on lisinopril 40mg, and propranolol 40mg, and spirinolactone 25mg,continue, continue f/u with Cardiology, Dr. Carlton for mgmt    7. Gastroesophageal reflux disease without esophagitis  Comments:  stable on prilosec 20mg, continue    8. Vertigo  Comments:  stable on meclazine 25mg prn, continue    9. Chronic obstructive pulmonary disease, unspecified COPD type  Comments:  stable on trelegy 100/62.5/25, and ablbuterol prn continue    10. Impaired fasting glucose  -      MicroAlbumin, Urine, Random - Urine, Clean Catch; Future  -     Hemoglobin A1c; Future    11. Anemia, unspecified type  -     Iron Profile; Future    12. Screening for prostate cancer  -     PSA SCREENING; Future    13. Shortness of breath  -     Fluticasone-Umeclidin-Vilant (Trelegy Ellipta) 100-62.5-25 MCG/ACT inhaler; Inhale 1 puff Daily. Indications: 2 box, first box Lot: [id number] EXP: 3/2024, 2nd box LOT: [id number] EXP: 6/2024  Dispense: 28 each; Refill: 0              Follow Up     Return in about 6 months (around 11/11/2023).    Patient was given instructions and counseling regarding his condition or for health maintenance advice. Please see specific information pulled into the AVS if appropriate.     Asaf Mando Chilel  reports that he quit smoking about 23 years ago. His smoking use included cigarettes. He started smoking about 67 years ago. He has a 44.00 pack-year smoking history. He has never used smokeless tobacco..

## 2023-05-15 ENCOUNTER — LAB (OUTPATIENT)
Dept: LAB | Facility: HOSPITAL | Age: 78
End: 2023-05-15
Payer: MEDICARE

## 2023-05-15 DIAGNOSIS — R73.01 IMPAIRED FASTING GLUCOSE: ICD-10-CM

## 2023-05-15 DIAGNOSIS — E78.5 HYPERLIPIDEMIA, UNSPECIFIED HYPERLIPIDEMIA TYPE: ICD-10-CM

## 2023-05-15 DIAGNOSIS — F41.9 ANXIETY: ICD-10-CM

## 2023-05-15 DIAGNOSIS — D64.9 ANEMIA, UNSPECIFIED TYPE: ICD-10-CM

## 2023-05-15 DIAGNOSIS — Z12.5 SCREENING FOR PROSTATE CANCER: ICD-10-CM

## 2023-05-15 LAB
ALBUMIN SERPL-MCNC: 4.2 G/DL (ref 3.5–5.2)
ALBUMIN UR-MCNC: 1.6 MG/DL
ALBUMIN/GLOB SERPL: 1.8 G/DL
ALP SERPL-CCNC: 72 U/L (ref 39–117)
ALT SERPL W P-5'-P-CCNC: 16 U/L (ref 1–41)
ANION GAP SERPL CALCULATED.3IONS-SCNC: 14.8 MMOL/L (ref 5–15)
AST SERPL-CCNC: 20 U/L (ref 1–40)
BILIRUB SERPL-MCNC: 0.4 MG/DL (ref 0–1.2)
BUN SERPL-MCNC: 24 MG/DL (ref 8–23)
BUN/CREAT SERPL: 21.1 (ref 7–25)
CALCIUM SPEC-SCNC: 9.7 MG/DL (ref 8.6–10.5)
CHLORIDE SERPL-SCNC: 104 MMOL/L (ref 98–107)
CHOLEST SERPL-MCNC: 136 MG/DL (ref 0–200)
CO2 SERPL-SCNC: 19.2 MMOL/L (ref 22–29)
CREAT SERPL-MCNC: 1.14 MG/DL (ref 0.76–1.27)
EGFRCR SERPLBLD CKD-EPI 2021: 66.7 ML/MIN/1.73
GLOBULIN UR ELPH-MCNC: 2.4 GM/DL
GLUCOSE SERPL-MCNC: 103 MG/DL (ref 65–99)
HBA1C MFR BLD: 5.9 % (ref 4.8–5.6)
HDLC SERPL-MCNC: 33 MG/DL (ref 40–60)
IRON 24H UR-MRATE: 54 MCG/DL (ref 59–158)
IRON SATN MFR SERPL: 19 % (ref 20–50)
LDLC SERPL CALC-MCNC: 62 MG/DL (ref 0–100)
LDLC/HDLC SERPL: 1.58 {RATIO}
POTASSIUM SERPL-SCNC: 4.2 MMOL/L (ref 3.5–5.2)
PROT SERPL-MCNC: 6.6 G/DL (ref 6–8.5)
PSA SERPL-MCNC: 0.73 NG/ML (ref 0–4)
SODIUM SERPL-SCNC: 138 MMOL/L (ref 136–145)
TIBC SERPL-MCNC: 280 MCG/DL (ref 298–536)
TRANSFERRIN SERPL-MCNC: 188 MG/DL (ref 200–360)
TRIGL SERPL-MCNC: 255 MG/DL (ref 0–150)
URATE SERPL-MCNC: 5.2 MG/DL (ref 3.4–7)
VLDLC SERPL-MCNC: 41 MG/DL (ref 5–40)

## 2023-05-15 PROCEDURE — 83540 ASSAY OF IRON: CPT

## 2023-05-15 PROCEDURE — 36415 COLL VENOUS BLD VENIPUNCTURE: CPT

## 2023-05-15 PROCEDURE — 80061 LIPID PANEL: CPT

## 2023-05-15 PROCEDURE — 80053 COMPREHEN METABOLIC PANEL: CPT

## 2023-05-15 PROCEDURE — G0103 PSA SCREENING: HCPCS

## 2023-05-15 PROCEDURE — 84550 ASSAY OF BLOOD/URIC ACID: CPT

## 2023-05-15 PROCEDURE — 83036 HEMOGLOBIN GLYCOSYLATED A1C: CPT

## 2023-05-15 PROCEDURE — 84466 ASSAY OF TRANSFERRIN: CPT

## 2023-05-15 PROCEDURE — 82043 UR ALBUMIN QUANTITATIVE: CPT

## 2023-05-16 ENCOUNTER — TELEPHONE (OUTPATIENT)
Dept: FAMILY MEDICINE CLINIC | Facility: CLINIC | Age: 78
End: 2023-05-16
Payer: COMMERCIAL

## 2023-05-16 DIAGNOSIS — D50.8 OTHER IRON DEFICIENCY ANEMIA: Primary | ICD-10-CM

## 2023-05-16 RX ORDER — FERROUS SULFATE 325(65) MG
325 TABLET ORAL
Qty: 90 TABLET | Refills: 0 | Status: SHIPPED | OUTPATIENT
Start: 2023-05-16

## 2023-05-18 DIAGNOSIS — E78.00 HIGH CHOLESTEROL: Chronic | ICD-10-CM

## 2023-05-19 RX ORDER — ATORVASTATIN CALCIUM 80 MG/1
TABLET, FILM COATED ORAL
Qty: 90 TABLET | Refills: 1 | Status: SHIPPED | OUTPATIENT
Start: 2023-05-19

## 2023-05-19 RX ORDER — MECLIZINE HYDROCHLORIDE 25 MG/1
25 TABLET ORAL 3 TIMES DAILY PRN
Qty: 270 TABLET | Refills: 1 | Status: SHIPPED | OUTPATIENT
Start: 2023-05-19

## 2023-05-28 DIAGNOSIS — I10 HYPERTENSION, UNSPECIFIED TYPE: ICD-10-CM

## 2023-05-30 RX ORDER — PROPRANOLOL HYDROCHLORIDE 40 MG/1
TABLET ORAL
Qty: 270 TABLET | Refills: 1 | Status: SHIPPED | OUTPATIENT
Start: 2023-05-30

## 2023-05-31 DIAGNOSIS — F41.9 ANXIETY: ICD-10-CM

## 2023-05-31 RX ORDER — BUSPIRONE HYDROCHLORIDE 10 MG/1
TABLET ORAL
Qty: 90 TABLET | Refills: 1 | Status: SHIPPED | OUTPATIENT
Start: 2023-05-31

## 2023-06-03 DIAGNOSIS — B37.42 CANDIDAL BALANITIS: ICD-10-CM

## 2023-06-04 DIAGNOSIS — M10.9 GOUT, UNSPECIFIED CAUSE, UNSPECIFIED CHRONICITY, UNSPECIFIED SITE: ICD-10-CM

## 2023-06-04 DIAGNOSIS — I10 HYPERTENSION, UNSPECIFIED TYPE: ICD-10-CM

## 2023-06-04 DIAGNOSIS — D50.8 OTHER IRON DEFICIENCY ANEMIA: ICD-10-CM

## 2023-06-04 DIAGNOSIS — F41.9 ANXIETY: ICD-10-CM

## 2023-06-05 RX ORDER — FERROUS SULFATE 325(65) MG
TABLET ORAL
Qty: 90 TABLET | Refills: 1 | Status: SHIPPED | OUTPATIENT
Start: 2023-06-05

## 2023-06-05 RX ORDER — CLOTRIMAZOLE AND BETAMETHASONE DIPROPIONATE 10; .64 MG/G; MG/G
CREAM TOPICAL
Qty: 45 G | Refills: 0 | Status: SHIPPED | OUTPATIENT
Start: 2023-06-05

## 2023-06-06 RX ORDER — LISINOPRIL 40 MG/1
TABLET ORAL
Qty: 90 TABLET | Refills: 1 | Status: SHIPPED | OUTPATIENT
Start: 2023-06-06

## 2023-06-06 RX ORDER — AMITRIPTYLINE HYDROCHLORIDE 25 MG/1
TABLET, FILM COATED ORAL
Qty: 90 TABLET | Refills: 1 | Status: SHIPPED | OUTPATIENT
Start: 2023-06-06

## 2023-06-06 RX ORDER — ALLOPURINOL 300 MG/1
TABLET ORAL
Qty: 90 TABLET | Refills: 1 | Status: SHIPPED | OUTPATIENT
Start: 2023-06-06

## 2023-06-08 ENCOUNTER — TELEPHONE (OUTPATIENT)
Dept: CARDIOLOGY | Facility: CLINIC | Age: 78
End: 2023-06-08
Payer: COMMERCIAL

## 2023-06-08 NOTE — TELEPHONE ENCOUNTER
PT CALLED REQUESTING CLARIFICATION ON HIS SPIRONOLACTONE.  HE IS NOT SURE IF HE IS TO TAKE DAILY OR EVERY OTHER DAY.  HE HAS BEEN TAKING EVERY OTHER DAY.  PLEASE ADVISE

## 2023-06-08 NOTE — TELEPHONE ENCOUNTER
NOTIFIED PT   Scribe Attestation (For Scribes USE Only)... Attending Attestation (For Attendings USE Only).../Scribe Attestation (For Scribes USE Only)...

## 2023-07-21 NOTE — TELEPHONE ENCOUNTER
Caller: Asaf Chilel    Relationship: Self    Best call back number: [phone]     Requested Prescriptions:   Requested Prescriptions     Pending Prescriptions Disp Refills    HYDROcodone-acetaminophen (NORCO) 5-325 MG per tablet 12 tablet 0     Sig: Take 1 tablet by mouth Every 6 (Six) Hours As Needed for Moderate Pain.        Pharmacy where request should be sent: [facility]/PHARMACY #[id number] - [place], [place] - [address] [facility] [phone] [facility] [phone] FX     Last office visit with prescribing clinician: 7/10/2023   Last telemedicine visit with prescribing clinician: Visit date not found   Next office visit with prescribing clinician: 8/21/2023     Does the patient have less than a 3 day supply:  [] Yes  [x] No    Would you like a call back once the refill request has been completed: [] Yes [x] No    If the office needs to give you a call back, can they leave a voicemail: [] Yes [x] No    Iraida Treviño Rep   07/21/23 09:29 EDT

## 2023-07-24 ENCOUNTER — TELEPHONE (OUTPATIENT)
Dept: FAMILY MEDICINE CLINIC | Facility: CLINIC | Age: 78
End: 2023-07-24
Payer: COMMERCIAL

## 2023-07-24 RX ORDER — KETOROLAC TROMETHAMINE 10 MG/1
10 TABLET, FILM COATED ORAL EVERY 6 HOURS PRN
Qty: 24 TABLET | Refills: 0 | Status: SHIPPED | OUTPATIENT
Start: 2023-07-24

## 2023-07-24 RX ORDER — HYDROCODONE BITARTRATE AND ACETAMINOPHEN 5; 325 MG/1; MG/1
1 TABLET ORAL EVERY 6 HOURS PRN
Qty: 12 TABLET | Refills: 0 | OUTPATIENT
Start: 2023-07-24

## 2023-07-24 NOTE — TELEPHONE ENCOUNTER
Caller: Asaf Chilel    Relationship: Self    Best call back number: [phone]    What is the best time to reach you: anytime     Who are you requesting to speak with (clinical staff, provider,  specific staff member): Ariana     Do you know the name of the person who called: asaf     What was the call regarding: patient called stating he was needing his norco refilled, I advise to patient that  does not refill that medication, Patient states he will take anything even if it is not norco.     Is it okay if the provider responds through MyChart: call back

## 2023-07-24 NOTE — TELEPHONE ENCOUNTER
Pt called back and is ok with Toradol - please send to [facility] pharmacy.

## 2023-08-01 DIAGNOSIS — F41.9 ANXIETY: ICD-10-CM

## 2023-08-01 RX ORDER — BUSPIRONE HYDROCHLORIDE 10 MG/1
TABLET ORAL
Qty: 90 TABLET | Refills: 1 | Status: SHIPPED | OUTPATIENT
Start: 2023-08-01

## 2023-08-08 ENCOUNTER — OFFICE VISIT (OUTPATIENT)
Dept: CARDIOLOGY | Facility: CLINIC | Age: 78
End: 2023-08-08
Payer: MEDICARE

## 2023-08-08 VITALS
WEIGHT: 224 LBS | SYSTOLIC BLOOD PRESSURE: 148 MMHG | HEART RATE: 61 BPM | HEIGHT: 72 IN | BODY MASS INDEX: 30.34 KG/M2 | DIASTOLIC BLOOD PRESSURE: 78 MMHG

## 2023-08-08 DIAGNOSIS — E78.2 MIXED HYPERLIPIDEMIA: ICD-10-CM

## 2023-08-08 DIAGNOSIS — I10 ESSENTIAL HYPERTENSION: Primary | ICD-10-CM

## 2023-08-08 DIAGNOSIS — Z86.79 HISTORY OF CARDIOMYOPATHY: ICD-10-CM

## 2023-08-08 DIAGNOSIS — I50.42 CHRONIC COMBINED SYSTOLIC AND DIASTOLIC CONGESTIVE HEART FAILURE: ICD-10-CM

## 2023-08-08 RX ORDER — SPIRONOLACTONE 25 MG/1
TABLET ORAL
Qty: 90 TABLET | Refills: 3 | Status: SHIPPED | OUTPATIENT
Start: 2023-08-08

## 2023-08-08 NOTE — PROGRESS NOTES
Chief Complaint  Cardiomyopathy, Congestive Heart Failure, Hypertension, and Follow-up (6 month)    Subjective        History of Present Illness  Asaf Chilel presents to [facility]   Mr. Chilel is a 78-year-old male patient coming in for routine follow-up.  She under the care of Dr. Carlton and prior to that Dr. Duvall.  He has a history of cardiomyopathy with normalized EF, hypertension and hyperlipidemia.  He reports he has been doing well, and cardiac wise he does not have any concerns today.  Specifically he has no complaints of chest pains, palpitations, or shortness of breath.    He did have recent ER evaluation due to abdominal pain and was found to have renal cyst.  He has upcoming appointment with urology, and is unsure if he will be undergoing any surgical procedures.        Past Medical History:   Diagnosis Date    Anxiety     Arthritis 20 years    Asthma 5 years    Cancer     Cardiomyopathy     Cataract 5years    CHF (congestive heart failure) 2015    COPD (chronic obstructive pulmonary disease)     Coronary artery disease     Diverticulosis 20 years    Eating disorder     Erectile dysfunction 5 years    Gastroesophageal reflux     Gout     Headache     High cholesterol     Hip pain     Hoarseness, chronic     Hypertension     Hypothyroid     AJ (iron deficiency anemia) 05/07/2015    Inflammatory bowel disease     Limb pain     Lumbago 09/03/2013    Myocardial infarction     Pancreatitis     Peripheral vascular disease 03/28/2015    Pneumonia     Sciatica 09/03/2013    Scoliosis     Tremor     Visual impairment 2015       No Known Allergies     Past Surgical History:   Procedure Laterality Date    CARDIAC SURGERY  2015    COLONOSCOPY  2014    normal Dr. Bingham    COLONOSCOPY N/A 04/27/2022    Procedure: COLONOSCOPY WITH POLYPECTOMY;  Surgeon: Juarez Seo MD;  Location: [facility] ENDOSCOPY;  Service: General;  Laterality: N/A;  DIVERTICULOSIS, COLON POLYP    CORONARY  ARTERY BYPASS GRAFT      CORONARY STENT PLACEMENT  2015    ENDOSCOPY N/A 04/27/2022    Procedure: ESOPHAGOGASTRODUODENOSCOPY WITH BIOPSIES AND POLYPECTOMY;  Surgeon: Juarez Seo MD;  Location: [facility] ENDOSCOPY;  Service: General;  Laterality: N/A;  GASTRIC POLYPS    EYE SURGERY Right     cancer removed x 2    JOINT REPLACEMENT      LYMPH NODE BIOPSY      MANDIBLE SURGERY      cancer removed    SHOULDER SURGERY Right         Social History  He  reports that he quit smoking about 23 years ago. His smoking use included cigarettes. He started smoking about 67 years ago. He has a 44.00 pack-year smoking history. He has never used smokeless tobacco. He reports that he does not currently use alcohol. He reports that he does not use drugs.    Family History  His family history includes Arthritis in his father; Cancer in his brother, brother, brother, father, mother, and another family member; Heart disease in his mother; Heart failure in an other family member; Hyperlipidemia in his mother; Hypertension in his mother; Other in an other family member; Thyroid disease in his brother.       Current Outpatient Medications on File Prior to Visit   Medication Sig    albuterol sulfate  (90 Base) MCG/ACT inhaler Inhale 1 puff Every 4 (Four) Hours As Needed for Shortness of Air.    allopurinol (ZYLOPRIM) 300 MG tablet TAKE 1 TABLET BY MOUTH EVERY DAY    amitriptyline (ELAVIL) 25 MG tablet TAKE 1 TABLET BY MOUTH EVERYDAY AT BEDTIME    aspirin 81 MG EC tablet Take 1 tablet by mouth Daily. Last dose Friday 4/22/22    busPIRone (BUSPAR) 10 MG tablet TAKE 1 TABLET BY MOUTH THREE TIMES A DAY    clotrimazole-betamethasone (LOTRISONE) 1-0.05 % cream APPLY TOPICALLY TO THE APPROPRIATE AREA TWICE A DAY AS DIRECTED    ferrous sulfate 325 (65 FE) MG tablet TAKE 1 TABLET BY MOUTH EVERY DAY WITH BREAKFAST    Fluticasone-Umeclidin-Vilant (Trelegy Ellipta) 100-62.5-25 MCG/ACT inhaler Inhale 1 puff Daily. Indications: 2 box, first box  "Lot: [id number] EXP: 3/2024, 2nd box LOT: [id number] EXP: 6/2024    icosapent ethyl (Vascepa) 1 g capsule capsule Take 2 g by mouth 2 (Two) Times a Day With Meals.    lisinopril (PRINIVIL,ZESTRIL) 40 MG tablet TAKE 1 TABLET BY MOUTH EVERY DAY    meclizine (ANTIVERT) 25 MG tablet TAKE 1 TABLET BY MOUTH 3 (THREE) TIMES A DAY AS NEEDED FOR NAUSEA.    multivitamin with minerals tablet tablet Take 1 tablet by mouth Daily.    propranolol (INDERAL) 40 MG tablet TAKE 1 TABLET BY MOUTH THREE TIMES A DAY    Synthroid 88 MCG tablet Take 1 tablet by mouth.    ketoconazole (NIZORAL) 2 % shampoo Apply 1 application topically to the appropriate area as directed 1 (One) Time Per Week.     No current facility-administered medications on file prior to visit.         Review of Systems   Constitutional:  Positive for fatigue.   Respiratory:  Negative for cough, chest tightness and shortness of breath.    Cardiovascular:  Negative for chest pain, palpitations and leg swelling.   Gastrointestinal:  Negative for nausea and vomiting.   Musculoskeletal:  Positive for arthralgias.   Neurological:  Negative for dizziness and syncope.   Hematological:  Does not bruise/bleed easily.      Objective   Vitals:    08/08/23 0947   BP: 148/78   Pulse: 61   Weight: 102 kg (224 lb)   Height: 182.9 cm (72\")         Physical Exam  General : Alert, awake, no acute distress  Neck : Supple, no carotid bruit, no jugular venous distention  CVS : Regular rate and rhythm, no murmur, rubs or gallops  Lungs: Clear to auscultation bilaterally, no crackles or rhonchi  Abdomen: Soft, nontender, bowel sounds active  Extremities: Warm, well-perfused, no pedal edema      Result Review     The following data was reviewed by Patty Roberts, APRN  No results found for: PROBNP  CMP          12/19/2022    09:09 5/15/2023    08:00 7/5/2023    11:43   CMP   Glucose 97  103  97    BUN 12  24  22    Creatinine 1.30  1.14  1.22    EGFR 56.9  66.7  61.1    Sodium 137  138  139    Potassium " 4.3  4.2  4.4    Chloride 102  104  104    Calcium 9.3  9.7  9.2    Total Protein 6.6  6.6  7.3    Albumin 4.30  4.2  4.5    Globulin 2.3  2.4  2.8    Total Bilirubin 0.3  0.4  0.6    Alkaline Phosphatase 82  72  78    AST (SGOT) 21  20  19    ALT (SGPT) 15  16  14    Albumin/Globulin Ratio 1.9  1.8  1.6    BUN/Creatinine Ratio 9.2  21.1  18.0    Anion Gap 7.4  14.8  11.5      CBC w/diff          12/19/2022    09:09 7/5/2023    11:43   CBC w/Diff   WBC 7.12  8.82    RBC 4.49  4.58    Hemoglobin 13.6  14.0    Hematocrit 40.3  41.3    MCV 89.8  90.2    MCH 30.3  30.6    MCHC 33.7  33.9    RDW 13.2  13.7    Platelets 180  164    Neutrophil Rel % 56.9  61.2    Immature Granulocyte Rel % 0.4  0.6    Lymphocyte Rel % 30.8  24.6    Monocyte Rel % 7.4  7.5    Eosinophil Rel % 3.9  5.6    Basophil Rel % 0.6  0.5       Lab Results   Component Value Date    TSH 1.200 12/19/2022      Lab Results   Component Value Date    FREET4 1.3 12/01/2020      No results found for: DDIMERQUANT  Magnesium   Date Value Ref Range Status   12/19/2022 1.6 1.6 - 2.4 mg/dL Final      No results found for: DIGOXIN   No results found for: TROPONINT        Lipid Panel          12/19/2022    09:09 5/15/2023    08:00   Lipid Panel   Total Cholesterol 143  136    Triglycerides 202  255    HDL Cholesterol 37  33    VLDL Cholesterol 34  41    LDL Cholesterol  72  62    LDL/HDL Ratio 1.77  1.58        Echocardiogram 1/14/21:  CONCLUSION:  1.  Left ventricular chamber size is normal. The left ventricular wall thickness is normal. The left ventricular        systolic function is mildly reduced with an estimated EF of 50%. No significant regional wall motion        abnormalities are identified.   2.  Left ventricular diastolic dysfunction.  3.  Fibrocalcific changes noted of the aortic valve without significant stenosis.         ECG 12 Lead    Date/Time: 8/8/2023 8:37 PM  Performed by: Patty Roberts APRN  Authorized by: Patty Roberts APRN   Comparison:  compared with previous ECG from 3/10/2020  Similar to previous ECG  Rhythm: sinus rhythm  Rate: bradycardic  QRS axis: normal    Clinical impression: abnormal EKG  Comments: Prolonged NC interval  RBBB and LAFB  Probable left ventricular hypertrophy                  Assessment and Plan   Diagnoses and all orders for this visit:    1. Essential hypertension (Primary)  Assessment & Plan:  Reports good blood pressure control at home, mildly elevated 148/78 in office.  We will continue current regimen recommend continue home monitoring of BP.    Orders:  -     spironolactone (ALDACTONE) 25 MG tablet; Take 1 tablet daily except  on Sundays take 0.5 tablet  Dispense: 90 tablet; Refill: 3  -     Comprehensive Metabolic Panel; Future    2. History of cardiomyopathy  Assessment & Plan:  He previously had significant LV dysfunction, he has normalized most recent echocardiogram shows EF of 50%.  He does not have any clinical signs of decompensation or volume overload.  Continue current medication regiment.    Orders:  -     spironolactone (ALDACTONE) 25 MG tablet; Take 1 tablet daily except  on Sundays take 0.5 tablet  Dispense: 90 tablet; Refill: 3  -     Cancel: ECG 12 Lead; Future  -     ECG 12 Lead    3. Mixed hyperlipidemia  Assessment & Plan:  His LDL is at goal, most recent level 62.  Triglycerides remain elevated.  For now continue high-dose atorvastatin and Vascepa.  Work on dietary changes to help lower his triglyceride level.    Orders:  -     Lipid Panel; Future    4. Chronic combined systolic and diastolic congestive heart failure  Assessment & Plan:  He is euvolemic-continue current medication regiment.  EKG completed in office today just for monitoring as he may likely have upcoming surgical procedure, no symptoms or concerns noted.             Follow Up   Return in about 9 months (around 5/8/2024) for Next scheduled follow up, with new MD d/t Dr Carlton leaving.    Patient was given instructions and counseling  regarding his condition or for health maintenance advice. Please see specific information pulled into the AVS if appropriate.     Signed,  Patty Roberts, APRN  08/08/2023     Dictated Utilizing Dragon Dictation: Please note that portions of this note were completed with a voice recognition program.  Part of this note may be an electronic transcription/translation of spoken language to printed text using the Dragon Dictation System.

## 2023-08-21 ENCOUNTER — OFFICE VISIT (OUTPATIENT)
Dept: FAMILY MEDICINE CLINIC | Facility: CLINIC | Age: 78
End: 2023-08-21
Payer: MEDICARE

## 2023-08-21 VITALS
DIASTOLIC BLOOD PRESSURE: 59 MMHG | WEIGHT: 224 LBS | HEIGHT: 72 IN | SYSTOLIC BLOOD PRESSURE: 130 MMHG | HEART RATE: 60 BPM | OXYGEN SATURATION: 98 % | BODY MASS INDEX: 30.34 KG/M2

## 2023-08-21 DIAGNOSIS — Z00.00 MEDICARE ANNUAL WELLNESS VISIT, SUBSEQUENT: Primary | ICD-10-CM

## 2023-08-21 PROCEDURE — 3075F SYST BP GE 130 - 139MM HG: CPT | Performed by: NURSE PRACTITIONER

## 2023-08-21 PROCEDURE — G0439 PPPS, SUBSEQ VISIT: HCPCS | Performed by: NURSE PRACTITIONER

## 2023-08-21 PROCEDURE — 1170F FXNL STATUS ASSESSED: CPT | Performed by: NURSE PRACTITIONER

## 2023-08-21 PROCEDURE — 1159F MED LIST DOCD IN RCRD: CPT | Performed by: NURSE PRACTITIONER

## 2023-08-21 PROCEDURE — 3078F DIAST BP <80 MM HG: CPT | Performed by: NURSE PRACTITIONER

## 2023-08-21 PROCEDURE — 1160F RVW MEDS BY RX/DR IN RCRD: CPT | Performed by: NURSE PRACTITIONER

## 2023-08-21 NOTE — PROGRESS NOTES
The ABCs of the Annual Wellness Visit  Subsequent Medicare Wellness Visit    Subjective      Asaf Chilel is a 78 y.o. male who presents for a Subsequent Medicare Wellness Visit.    The following portions of the patient's history were reviewed and   updated as appropriate: allergies, current medications, past family history, past medical history, past social history, past surgical history, and problem list.    Compared to one year ago, the patient feels his physical   health is the same.    Compared to one year ago, the patient feels his mental   health is the same.    Recent Hospitalizations:  He was not admitted to the hospital during the last year.       Current Medical Providers:  Patient Care Team:  Naheed Mane APRN as PCP - General (Nurse Practitioner)  Marina Martin APRN as Nurse Practitioner (Nurse Practitioner)    Outpatient Medications Prior to Visit   Medication Sig Dispense Refill    albuterol sulfate  (90 Base) MCG/ACT inhaler Inhale 1 puff Every 4 (Four) Hours As Needed for Shortness of Air. 6.7 g 2    allopurinol (ZYLOPRIM) 300 MG tablet TAKE 1 TABLET BY MOUTH EVERY DAY 90 tablet 1    amitriptyline (ELAVIL) 25 MG tablet TAKE 1 TABLET BY MOUTH EVERYDAY AT BEDTIME 90 tablet 1    aspirin 81 MG EC tablet Take 1 tablet by mouth Daily. Last dose Friday 4/22/22      atorvastatin (LIPITOR) 80 MG tablet TAKE 1 TABLET BY MOUTH EVERY DAY 90 tablet 1    busPIRone (BUSPAR) 10 MG tablet TAKE 1 TABLET BY MOUTH THREE TIMES A DAY 90 tablet 1    clotrimazole-betamethasone (LOTRISONE) 1-0.05 % cream APPLY TOPICALLY TO THE APPROPRIATE AREA TWICE A DAY AS DIRECTED 45 g 0    ferrous sulfate 325 (65 FE) MG tablet TAKE 1 TABLET BY MOUTH EVERY DAY WITH BREAKFAST 90 tablet 1    Fluticasone-Umeclidin-Vilant (Trelegy Ellipta) 100-62.5-25 MCG/ACT inhaler Inhale 1 puff Daily. Indications: 2 box, first box Lot: [id number] EXP: 3/2024, 2nd box LOT: [id number] EXP: 6/2024 28 each 0    icosapent ethyl (Vascepa) 1 g capsule  capsule Take 2 g by mouth 2 (Two) Times a Day With Meals. 360 capsule 3    ketoconazole (NIZORAL) 2 % shampoo Apply 1 application topically to the appropriate area as directed 1 (One) Time Per Week.      lisinopril (PRINIVIL,ZESTRIL) 40 MG tablet TAKE 1 TABLET BY MOUTH EVERY DAY 90 tablet 1    meclizine (ANTIVERT) 25 MG tablet TAKE 1 TABLET BY MOUTH 3 (THREE) TIMES A DAY AS NEEDED FOR NAUSEA. 270 tablet 1    multivitamin with minerals tablet tablet Take 1 tablet by mouth Daily.      omeprazole (priLOSEC) 20 MG capsule TAKE 2 CAPSULES BY MOUTH EVERY  capsule 1    propranolol (INDERAL) 40 MG tablet TAKE 1 TABLET BY MOUTH THREE TIMES A  tablet 1    spironolactone (ALDACTONE) 25 MG tablet Take 1 tablet daily except  on Sundays take 0.5 tablet 90 tablet 3    Synthroid 88 MCG tablet Take 1 tablet by mouth.       No facility-administered medications prior to visit.       No opioid medication identified on active medication list. I have reviewed chart for other potential  high risk medication/s and harmful drug interactions in the elderly.        Aspirin is on active medication list. Aspirin use is indicated based on review of current medical condition/s. Pros and cons of this therapy have been discussed today. Benefits of this medication outweigh potential harm.  Patient has been encouraged to continue taking this medication.  .      Patient Active Problem List   Diagnosis    Hypertension    Gastroesophageal reflux    Anxiety    Anemia, iron deficiency    Gout    High cholesterol    Hip pain    Low back pain    Peripheral vascular disease    Skin cancer    Hypothyroid    Impaired fasting glucose    Rodríguez's neuroma of right foot    Cardiomyopathy    Limb pain    Tapia's esophagus    Proteinuria    Hyperlipidemia    Obesity (BMI 30-39.9)    Chronic combined systolic and diastolic congestive heart failure     Advance Care Planning   Advance Care Planning     Advance Directive is on file.  ACP discussion was  "held with the patient during this visit. Patient has an advance directive in EMR which is still valid.      Objective    Vitals:    23 0823   BP: 130/59   Pulse: 60   SpO2: 98%   Weight: 102 kg (224 lb)   Height: 182.9 cm (72\")   PainSc: 0-No pain     Estimated body mass index is 30.38 kg/mý as calculated from the following:    Height as of this encounter: 182.9 cm (72\").    Weight as of this encounter: 102 kg (224 lb).    BMI is >= 30 and <35. (Class 1 Obesity). The following options were offered after discussion;: weight loss educational material (shared in after visit summary) and exercise counseling/recommendations      Does the patient have evidence of cognitive impairment?   No            HEALTH RISK ASSESSMENT    Smoking Status:  Social History     Tobacco Use   Smoking Status Former    Packs/day: 1.00    Years: 44.00    Pack years: 44.00    Types: Cigarettes    Start date: 1956    Quit date: 2000    Years since quittin.6   Smokeless Tobacco Never     Alcohol Consumption:  Social History     Substance and Sexual Activity   Alcohol Use Not Currently    Comment: Quit      Fall Risk Screen:    ALFONSOADI Fall Risk Assessment was completed, and patient is at LOW risk for falls.Assessment completed on:2023    Depression Screenin/21/2023     8:00 AM   PHQ-2/PHQ-9 Depression Screening   Little Interest or Pleasure in Doing Things 0-->not at all   Feeling Down, Depressed or Hopeless 0-->not at all   PHQ-9: Brief Depression Severity Measure Score 0       Health Habits and Functional and Cognitive Screenin/21/2023     8:00 AM   Functional & Cognitive Status   Do you have difficulty preparing food and eating? No   Do you have difficulty bathing yourself, getting dressed or grooming yourself? No   Do you have difficulty using the toilet? No   Do you have difficulty moving around from place to place? No   Do you have trouble with steps or getting out of a bed or a chair? No "   Current Diet Well Balanced Diet   Dental Exam Up to date   Eye Exam Up to date   Exercise (times per week) 5 times per week   Current Exercises Include Yard Work;Walking   Do you need help using the phone?  No   Are you deaf or do you have serious difficulty hearing?  No   Do you need help to go to places out of walking distance? No   Do you need help shopping? No   Do you need help preparing meals?  No   Do you need help with housework?  No   Do you need help with laundry? No   Do you need help taking your medications? No   Do you need help managing money? No   Do you ever drive or ride in a car without wearing a seat belt? No   Have you felt unusual stress, anger or loneliness in the last month? No   Who do you live with? Spouse   If you need help, do you have trouble finding someone available to you? No   Have you been bothered in the last four weeks by sexual problems? No   Do you have difficulty concentrating, remembering or making decisions? No       Age-appropriate Screening Schedule:  Refer to the list below for future screening recommendations based on patient's age, sex and/or medical conditions. Orders for these recommended tests are listed in the plan section. The patient has been provided with a written plan.    Health Maintenance   Topic Date Due    TDAP/TD VACCINES (1 - Tdap) 05/11/2024 (Originally 6/6/1964)    COVID-19 Vaccine (6 - Moderna series) 08/21/2024 (Originally 4/21/2023)    ZOSTER VACCINE (2 of 3) 08/21/2024 (Originally 12/27/2009)    INFLUENZA VACCINE  10/01/2023    GASTROSCOPY (EGD)  04/27/2024    LIPID PANEL  05/15/2024    ANNUAL WELLNESS VISIT  08/21/2024    HEPATITIS C SCREENING  Completed    Pneumococcal Vaccine 65+  Completed    COLORECTAL CANCER SCREENING  Discontinued                  CMS Preventative Services Quick Reference  Risk Factors Identified During Encounter:    None Identified    The above risks/problems have been discussed with the patient.  Pertinent information has  been shared with the patient in the After Visit Summary.    Diagnoses and all orders for this visit:    1. Medicare annual wellness visit, subsequent (Primary)        Follow Up:   Next Medicare Wellness visit to be scheduled in 1 year.      An After Visit Summary and PPPS were made available to the patient.

## 2023-08-23 DIAGNOSIS — K21.9 GASTROESOPHAGEAL REFLUX DISEASE, UNSPECIFIED WHETHER ESOPHAGITIS PRESENT: ICD-10-CM

## 2023-08-23 DIAGNOSIS — E78.00 HIGH CHOLESTEROL: Chronic | ICD-10-CM

## 2023-08-23 RX ORDER — ATORVASTATIN CALCIUM 80 MG/1
TABLET, FILM COATED ORAL
Qty: 90 TABLET | Refills: 1 | Status: SHIPPED | OUTPATIENT
Start: 2023-08-23

## 2023-08-23 RX ORDER — OMEPRAZOLE 20 MG/1
CAPSULE, DELAYED RELEASE ORAL
Qty: 180 CAPSULE | Refills: 1 | Status: SHIPPED | OUTPATIENT
Start: 2023-08-23

## 2023-08-24 PROBLEM — Z86.79 HISTORY OF CARDIOMYOPATHY: Status: ACTIVE | Noted: 2023-08-24

## 2023-08-24 NOTE — ASSESSMENT & PLAN NOTE
Reports good blood pressure control at home, mildly elevated 148/78 in office.  We will continue current regimen recommend continue home monitoring of BP.

## 2023-08-24 NOTE — ASSESSMENT & PLAN NOTE
His LDL is at goal, most recent level 62.  Triglycerides remain elevated.  For now continue high-dose atorvastatin and Vascepa.  Work on dietary changes to help lower his triglyceride level.

## 2023-08-24 NOTE — ASSESSMENT & PLAN NOTE
He is euvolemic-continue current medication regiment.  EKG completed in office today just for monitoring as he may likely have upcoming surgical procedure, no symptoms or concerns noted.

## 2023-08-24 NOTE — ASSESSMENT & PLAN NOTE
He previously had significant LV dysfunction, he has normalized most recent echocardiogram shows EF of 50%.  He does not have any clinical signs of decompensation or volume overload.  Continue current medication regiment.

## 2023-08-30 ENCOUNTER — OFFICE VISIT (OUTPATIENT)
Dept: FAMILY MEDICINE CLINIC | Facility: CLINIC | Age: 78
End: 2023-08-30
Payer: MEDICARE

## 2023-08-30 VITALS
SYSTOLIC BLOOD PRESSURE: 128 MMHG | DIASTOLIC BLOOD PRESSURE: 60 MMHG | HEART RATE: 58 BPM | OXYGEN SATURATION: 99 % | HEIGHT: 72 IN | WEIGHT: 224 LBS | BODY MASS INDEX: 30.34 KG/M2

## 2023-08-30 DIAGNOSIS — B34.9 VIRAL ILLNESS: Primary | ICD-10-CM

## 2023-08-30 DIAGNOSIS — J02.9 SORE THROAT: ICD-10-CM

## 2023-08-30 DIAGNOSIS — R05.1 ACUTE COUGH: ICD-10-CM

## 2023-08-30 LAB
EXPIRATION DATE: NORMAL
EXPIRATION DATE: NORMAL
FLUAV AG UPPER RESP QL IA.RAPID: NOT DETECTED
FLUBV AG UPPER RESP QL IA.RAPID: NOT DETECTED
INTERNAL CONTROL: NORMAL
INTERNAL CONTROL: NORMAL
Lab: 7376
Lab: NORMAL
S PYO AG THROAT QL: NEGATIVE
SARS-COV-2 AG UPPER RESP QL IA.RAPID: NOT DETECTED

## 2023-08-30 PROCEDURE — 1159F MED LIST DOCD IN RCRD: CPT | Performed by: NURSE PRACTITIONER

## 2023-08-30 PROCEDURE — 87880 STREP A ASSAY W/OPTIC: CPT | Performed by: NURSE PRACTITIONER

## 2023-08-30 PROCEDURE — 3074F SYST BP LT 130 MM HG: CPT | Performed by: NURSE PRACTITIONER

## 2023-08-30 PROCEDURE — 1160F RVW MEDS BY RX/DR IN RCRD: CPT | Performed by: NURSE PRACTITIONER

## 2023-08-30 PROCEDURE — 87428 SARSCOV & INF VIR A&B AG IA: CPT | Performed by: NURSE PRACTITIONER

## 2023-08-30 PROCEDURE — 99213 OFFICE O/P EST LOW 20 MIN: CPT | Performed by: NURSE PRACTITIONER

## 2023-08-30 PROCEDURE — 3078F DIAST BP <80 MM HG: CPT | Performed by: NURSE PRACTITIONER

## 2023-08-30 NOTE — PROGRESS NOTES
Chief Complaint  Sore Throat and Cough    Subjective            Asaf Chilel presents to [facility]  History of Present Illness  Pt has c/o sore throat, hoarseness, and cough. Pt reports this has been present for 2 days. Pt has taken advil and OTC cold meds with little relief. Pt would like to be tested for flu, covid, and strep.       Past Medical History:   Diagnosis Date    Anxiety     Arthritis 20 years    Asthma 5 years    Cancer     Cardiomyopathy     Cataract 5years    CHF (congestive heart failure) 2015    Surgery    COPD (chronic obstructive pulmonary disease)     Coronary artery disease     Diverticulosis 20 years    Eating disorder     Erectile dysfunction 5 years    Gastroesophageal reflux     Gout     Headache     High cholesterol     Hip pain     Hoarseness, chronic     Hypertension     Hypothyroid     AJ (iron deficiency anemia) 05/07/2015    Inflammatory bowel disease     Limb pain     Lumbago 09/03/2013    MRI shows abnormal appearance of bone marrow. Workup negative pet hem/onc    Myocardial infarction     Pancreatitis     Peripheral vascular disease 03/28/2015    Pneumonia     Sciatica 09/03/2013    Scoliosis     Tremor     Visual impairment 2015    Surgery       No Known Allergies     Past Surgical History:   Procedure Laterality Date    CARDIAC SURGERY  2015    COLONOSCOPY  2014    normal Dr. Bingham    COLONOSCOPY N/A 04/27/2022    Procedure: COLONOSCOPY WITH POLYPECTOMY;  Surgeon: Juarez Seo MD;  Location: [facility] END[facility]COPY;  Service: General;  Laterality: N/A;  DIVERTICULOSIS, COLON POLYP    CORONARY ARTERY BYPASS GRAFT      CORONARY STENT PLACEMENT  2015    ENDOSCOPY N/A 04/27/2022    Procedure: ESOPHAGOGASTRODUODENOSCOPY WITH BIOPSIES AND POLYPECTOMY;  Surgeon: Juarez Seo MD;  Location: [facility] ENDOSCOPY;  Service: General;  Laterality: N/A;  GASTRIC POLYPS    EYE SURGERY Right     cancer removed x 2    JOINT REPLACEMENT      LYMPH NODE  BIOPSY      MANDIBLE SURGERY      cancer removed    SHOULDER SURGERY Right         Social History     Tobacco Use    Smoking status: Former     Packs/day: 1.00     Years: 44.00     Pack years: 44.00     Types: Cigarettes     Start date: 1956     Quit date: 2000     Years since quittin.6    Smokeless tobacco: Never   Substance Use Topics    Alcohol use: Not Currently     Comment: Quit        Family History   Problem Relation Age of Onset    Cancer Mother     Heart disease Mother     Hyperlipidemia Mother     Hypertension Mother     Cancer Father         Lung    Arthritis Father     Cancer Brother         Throat    Cancer Other     Heart failure Other     Other Other         spine problems     Cancer Brother         Brain    Cancer Brother         Neck    Thyroid disease Brother     Malig Hyperthermia Neg Hx         Current Outpatient Medications on File Prior to Visit   Medication Sig    albuterol sulfate  (90 Base) MCG/ACT inhaler Inhale 1 puff Every 4 (Four) Hours As Needed for Shortness of Air.    allopurinol (ZYLOPRIM) 300 MG tablet TAKE 1 TABLET BY MOUTH EVERY DAY    amitriptyline (ELAVIL) 25 MG tablet TAKE 1 TABLET BY MOUTH EVERYDAY AT BEDTIME    aspirin 81 MG EC tablet Take 1 tablet by mouth Daily. Last dose 22    atorvastatin (LIPITOR) 80 MG tablet TAKE 1 TABLET BY MOUTH EVERY DAY    busPIRone (BUSPAR) 10 MG tablet TAKE 1 TABLET BY MOUTH THREE TIMES A DAY    clotrimazole-betamethasone (LOTRISONE) 1-0.05 % cream APPLY TOPICALLY TO THE APPROPRIATE AREA TWICE A DAY AS DIRECTED    ferrous sulfate 325 (65 FE) MG tablet TAKE 1 TABLET BY MOUTH EVERY DAY WITH BREAKFAST    Fluticasone-Umeclidin-Vilant (Trelegy Ellipta) 100-62.5-25 MCG/ACT inhaler Inhale 1 puff Daily. Indications: 2 box, first box Lot: [id number] EXP: 3/2024, 2nd box LOT: [id number] EXP: 2024    icosapent ethyl (Vascepa) 1 g capsule capsule Take 2 g by mouth 2 (Two) Times a Day With Meals.    ketoconazole (NIZORAL) 2 % shampoo  "Apply 1 application topically to the appropriate area as directed 1 (One) Time Per Week.    lisinopril (PRINIVIL,ZESTRIL) 40 MG tablet TAKE 1 TABLET BY MOUTH EVERY DAY    meclizine (ANTIVERT) 25 MG tablet TAKE 1 TABLET BY MOUTH 3 (THREE) TIMES A DAY AS NEEDED FOR NAUSEA.    multivitamin with minerals tablet tablet Take 1 tablet by mouth Daily.    omeprazole (priLOSEC) 20 MG capsule TAKE 2 CAPSULES BY MOUTH EVERY DAY    propranolol (INDERAL) 40 MG tablet TAKE 1 TABLET BY MOUTH THREE TIMES A DAY    spironolactone (ALDACTONE) 25 MG tablet Take 1 tablet daily except  on Sundays take 0.5 tablet    Synthroid 88 MCG tablet Take 1 tablet by mouth.     No current facility-administered medications on file prior to visit.       There are no preventive care reminders to display for this patient.    Objective     /60   Pulse 58   Ht 182.9 cm (72\")   Wt 102 kg (224 lb)   SpO2 99%   BMI 30.38 kg/mý       Physical Exam  Constitutional:       General: He is not in acute distress.     Appearance: Normal appearance. He is not ill-appearing.   HENT:      Head: Normocephalic and atraumatic.      Right Ear: Tympanic membrane, ear canal and external ear normal.      Left Ear: Tympanic membrane, ear canal and external ear normal.      Nose: Nose normal.   Cardiovascular:      Rate and Rhythm: Normal rate and regular rhythm.      Heart sounds: Normal heart sounds. No murmur heard.  Pulmonary:      Effort: Pulmonary effort is normal. No respiratory distress.      Breath sounds: Normal breath sounds.   Chest:      Chest wall: No tenderness.   Abdominal:      General: Abdomen is flat. Bowel sounds are normal. There is no distension.      Palpations: Abdomen is soft. There is no mass.      Tenderness: There is no abdominal tenderness. There is no guarding.   Musculoskeletal:         General: No swelling or tenderness. Normal range of motion.      Cervical back: Normal range of motion and neck supple.   Skin:     General: Skin is warm " and dry.      Findings: No rash.   Neurological:      General: No focal deficit present.      Mental Status: He is alert and oriented to person, place, and time. Mental status is at baseline.      Gait: Gait normal.   Psychiatric:         Mood and Affect: Mood normal.         Behavior: Behavior normal.         Thought Content: Thought content normal.         Judgment: Judgment normal.         Result Review :                           Assessment and Plan        Diagnoses and all orders for this visit:    1. Viral illness (Primary)  Comments:  advised OTC antihistamine, such as zyrtec or Claritin    2. Sore throat  Comments:  advised OTC antihistamine, such as zyrtec or Claritin  Orders:  -     POCT SARS-CoV-2 Antigen [place] + Flu  -     POC Rapid Strep A    3. Acute cough  Comments:  advised OTC antihistamine, such as zyrtec or Claritin  Orders:  -     POCT SARS-CoV-2 Antigen [place] + Flu  -     POC Rapid Strep A              Follow Up     Return if symptoms worsen or fail to improve.    Patient was given instructions and counseling regarding his condition or for health maintenance advice. Please see specific information pulled into the AVS if appropriate.     Asaf Chilel  reports that he quit smoking about 23 years ago. His smoking use included cigarettes. He started smoking about 67 years ago. He has a 44.00 pack-year smoking history. He has never used smokeless tobacco..        YASH Lambert

## 2023-09-27 ENCOUNTER — OFFICE VISIT (OUTPATIENT)
Dept: UROLOGY | Facility: CLINIC | Age: 78
End: 2023-09-27
Payer: MEDICARE

## 2023-09-27 VITALS
DIASTOLIC BLOOD PRESSURE: 68 MMHG | BODY MASS INDEX: 29.8 KG/M2 | SYSTOLIC BLOOD PRESSURE: 141 MMHG | WEIGHT: 220 LBS | HEIGHT: 72 IN

## 2023-09-27 DIAGNOSIS — N28.1 RENAL CYST: Primary | ICD-10-CM

## 2023-09-27 NOTE — PROGRESS NOTES
UROLOGY OFFICE H&P NOTE    Subjective   HPI  Asaf Chilel is a 78 y.o. male.  Presents for evaluation of CT scan.    The patient had a CT scan in 2023 for right-sided pain. The pain has resolved. The patient denies any known issues with his kidneys. He denies any urinary issues.      ___________  CT abdomen pelvis with contrast 2023: evidence for multiple right renal cysts.  There is a somewhat intermediate density focus   at the posterior margin of the midpole of the right kidney measuring approximately 1 cm.  This   finding may be related to a partially hemorrhagic cyst.    Creatinine 2023: 1.22       Medical History:  Past Medical History:   Diagnosis Date    Anxiety     Arthritis 20 years    Asthma 5 years    Cancer     Cardiomyopathy     Cataract 5years    CHF (congestive heart failure) 2015    Surgery    COPD (chronic obstructive pulmonary disease)     Coronary artery disease     Diverticulosis 20 years    Eating disorder     Erectile dysfunction 5 years    Gastroesophageal reflux     Gout     Headache     High cholesterol     Hip pain     Hoarseness, chronic     Hypertension     Hypothyroid     AJ (iron deficiency anemia) 2015    Inflammatory bowel disease     Limb pain     Lumbago 2013    MRI shows abnormal appearance of bone marrow. Workup negative pet hem/onc    Myocardial infarction     Pancreatitis     Peripheral vascular disease 2015    Pneumonia     Sciatica 2013    Scoliosis     Tremor     Visual impairment 2015    Surgery        Social History:  Social History     Socioeconomic History    Marital status:    Tobacco Use    Smoking status: Former     Packs/day: 1.00     Years: 44.00     Pack years: 44.00     Types: Cigarettes     Start date: 1956     Quit date: 2000     Years since quittin.7    Smokeless tobacco: Never   Vaping Use    Vaping Use: Never used   Substance and Sexual Activity    Alcohol use: Not Currently     Comment: Quit  2000    Drug use: Never    Sexual activity: Not Currently     Partners: Female     Birth control/protection: Pill, Surgical     Comment: O        Family History:  Family History   Problem Relation Age of Onset    Cancer Mother     Heart disease Mother     Hyperlipidemia Mother     Hypertension Mother     Cancer Father         Lung    Arthritis Father     Cancer Brother         Throat    Cancer Other     Heart failure Other     Other Other         spine problems     Cancer Brother         Brain    Cancer Brother         Neck    Thyroid disease Brother     Malig Hyperthermia Neg Hx         Surgical History:  Past Surgical History:   Procedure Laterality Date    CARDIAC SURGERY  2015    COLONOSCOPY  2014    normal Dr. Bingham    COLONOSCOPY N/A 04/27/2022    Procedure: COLONOSCOPY WITH POLYPECTOMY;  Surgeon: Juarez Seo MD;  Location: [facility] ENDOSCOPY;  Service: General;  Laterality: N/A;  DIVERTICULOSIS, COLON POLYP    CORONARY ARTERY BYPASS GRAFT      CORONARY STENT PLACEMENT  2015    ENDOSCOPY N/A 04/27/2022    Procedure: ESOPHAGOGASTRODUODENOSCOPY WITH BIOPSIES AND POLYPECTOMY;  Surgeon: Juarez Seo MD;  Location: [facility] ENDOSCOPY;  Service: General;  Laterality: N/A;  GASTRIC POLYPS    EYE SURGERY Right     cancer removed x 2    JOINT REPLACEMENT      LYMPH NODE BIOPSY      MANDIBLE SURGERY      cancer removed    SHOULDER SURGERY Right         Allergies:  No Known Allergies     Current Medications:  Current Outpatient Medications   Medication Sig Dispense Refill    albuterol sulfate  (90 Base) MCG/ACT inhaler Inhale 1 puff Every 4 (Four) Hours As Needed for Shortness of Air. 6.7 g 2    allopurinol (ZYLOPRIM) 300 MG tablet TAKE 1 TABLET BY MOUTH EVERY DAY 90 tablet 1    amitriptyline (ELAVIL) 25 MG tablet TAKE 1 TABLET BY MOUTH EVERYDAY AT BEDTIME 90 tablet 1    aspirin 81 MG EC tablet Take 1 tablet by mouth Daily. Last dose Friday 4/22/22      atorvastatin (LIPITOR) 80 MG tablet TAKE 1 TABLET BY  "MOUTH EVERY DAY 90 tablet 1    busPIRone (BUSPAR) 10 MG tablet TAKE 1 TABLET BY MOUTH THREE TIMES A DAY 90 tablet 1    clotrimazole-betamethasone (LOTRISONE) 1-0.05 % cream APPLY TOPICALLY TO THE APPROPRIATE AREA TWICE A DAY AS DIRECTED 45 g 0    ferrous sulfate 325 (65 FE) MG tablet TAKE 1 TABLET BY MOUTH EVERY DAY WITH BREAKFAST 90 tablet 1    Fluticasone-Umeclidin-Vilant (Trelegy Ellipta) 100-62.5-25 MCG/ACT inhaler Inhale 1 puff Daily. Indications: 2 box, first box Lot: [id number] EXP: 3/2024, 2nd box LOT: [id number] EXP: 6/2024 28 each 0    icosapent ethyl (Vascepa) 1 g capsule capsule Take 2 g by mouth 2 (Two) Times a Day With Meals. 360 capsule 3    ketoconazole (NIZORAL) 2 % shampoo Apply 1 application topically to the appropriate area as directed 1 (One) Time Per Week.      lisinopril (PRINIVIL,ZESTRIL) 40 MG tablet TAKE 1 TABLET BY MOUTH EVERY DAY 90 tablet 1    meclizine (ANTIVERT) 25 MG tablet TAKE 1 TABLET BY MOUTH 3 (THREE) TIMES A DAY AS NEEDED FOR NAUSEA. 270 tablet 1    multivitamin with minerals tablet tablet Take 1 tablet by mouth Daily.      omeprazole (priLOSEC) 20 MG capsule TAKE 2 CAPSULES BY MOUTH EVERY  capsule 1    propranolol (INDERAL) 40 MG tablet TAKE 1 TABLET BY MOUTH THREE TIMES A  tablet 1    spironolactone (ALDACTONE) 25 MG tablet Take 1 tablet daily except  on Sundays take 0.5 tablet 90 tablet 3    Synthroid 88 MCG tablet Take 1 tablet by mouth.       No current facility-administered medications for this visit.       Review of systems  A review of systems was performed, and positive findings are noted in the HPI.    Objective     Vital Signs:   /68   Ht 182.9 cm (72\")   Wt 99.8 kg (220 lb)   BMI 29.84 kg/m²       Physical exam  No acute distress, well-nourished  Awake alert and oriented  Mood normal; affect normal    Results  PROCEDURE:  CT ABDOMEN PELVIS W CONTRAST     COMPARISON: [facility], CT, CTA ABD RUNOFF, 12/09/2014, 12:52.     INDICATIONS:  Right " flank pain     TECHNIQUE:    After obtaining the patient's consent, CT images were created with non-ionic intravenous   contrast material.       PROTOCOL:     Standard imaging protocol performed                 RADIATION:      DLP: 1069 mGy*cm               Automated exposure control was utilized to minimize radiation dose.   CONTRAST:      100 cc Isovue 370 I.V.  LABS:   eGFR: >60 ml/min/1.73m2     FINDINGS:          No consolidations or pleural effusions are seen involving the lung bases.      A hemangioma is noted at the lateral aspect of the right lobe of the liver.  The liver is otherwise   unremarkable.  The spleen and pancreas are unremarkable.  The gallbladder and bile ducts are   unremarkable.  The adrenal glands are unremarkable.       There is evidence for multiple right renal cysts.  There is a somewhat intermediate density focus   at the posterior margin of the midpole of the right kidney measuring approximately 1 cm.  This   finding may be related to a partially hemorrhagic cyst.     No significant bowel dilatation or obstruction is seen. A normal-appearing appendix is observed.   There is no evidence for acute appendicitis. No abnormal fluid collections are seen. No significant   free fluid is observed. No significant lymphadenopathy is seen throughout the abdomen or pelvis.   The bladder is decompressed.      The celiac and superior mesenteric arterial distributions are well opacified without evidence for   occlusion.  Moderate to severe atherosclerosis is noted.  An overlay aortofemoral bypass graft is   noted.  The graft is patent.     No acute osseous abnormalities are seen.     IMPRESSION:                 1. No evidence for acute abnormality throughout the abdomen or pelvis.  2. Multiple right renal cysts are noted.  There is an intermediate density focus at the posterior   margin of the midpole of the right kidney which may be related to a partially hemorrhagic cyst.    Recommend correlation  with follow-up nonemergent outpatient MRI of the kidneys for confirmation.            SIRISHA LICEA MD         Electronically Signed and Approved By: SIRISHA LICEA MD on 7/05/2023 at 14:00                   Problem List:  Patient Active Problem List   Diagnosis    Essential hypertension    Gastroesophageal reflux    Anxiety    Anemia, iron deficiency    Gout    High cholesterol    Hip pain    Low back pain    Peripheral vascular disease    Skin cancer    Hypothyroid    Impaired fasting glucose    Rodríguez's neuroma of right foot    Cardiomyopathy    Limb pain    Tapia's esophagus    Proteinuria    Hyperlipidemia LDL goal <70    Obesity (BMI 30-39.9)    Chronic combined systolic and diastolic congestive heart failure    History of cardiomyopathy       Assessment & Plan   Diagnoses and all orders for this visit:    1. Renal cyst (Primary)      CT scan imaging personally reviewed, demonstrated discussed with patient; findings compatible with benign cysts    No further dedicated imaging or work-up indicated      Follow up as needed. All questions and concerns have been addressed at this time.      Transcribed from ambient dictation for Funmi Escalante MD by Lyn Nicolas.  09/27/23   10:07 EDT    Patient or patient representative verbalized consent to the visit recording.  I have personally performed the services described in this document as transcribed by the above individual, and it is both accurate and complete.

## 2023-09-28 ENCOUNTER — OFFICE VISIT (OUTPATIENT)
Dept: FAMILY MEDICINE CLINIC | Facility: CLINIC | Age: 78
End: 2023-09-28
Payer: MEDICARE

## 2023-09-28 VITALS
OXYGEN SATURATION: 96 % | HEART RATE: 66 BPM | HEIGHT: 72 IN | DIASTOLIC BLOOD PRESSURE: 58 MMHG | WEIGHT: 217 LBS | SYSTOLIC BLOOD PRESSURE: 102 MMHG | BODY MASS INDEX: 29.39 KG/M2

## 2023-09-28 DIAGNOSIS — L30.9 DERMATITIS: ICD-10-CM

## 2023-09-28 DIAGNOSIS — S76.212A INGUINAL STRAIN, LEFT, INITIAL ENCOUNTER: Primary | ICD-10-CM

## 2023-09-28 RX ORDER — BACLOFEN 10 MG/1
10 TABLET ORAL 3 TIMES DAILY PRN
Qty: 21 TABLET | Refills: 0 | Status: SHIPPED | OUTPATIENT
Start: 2023-09-28

## 2023-09-28 RX ORDER — BACLOFEN 10 MG/1
10 TABLET ORAL 3 TIMES DAILY PRN
Qty: 21 TABLET | Refills: 0 | Status: SHIPPED | OUTPATIENT
Start: 2023-09-28 | End: 2023-09-28 | Stop reason: SDUPTHER

## 2023-09-28 RX ORDER — TRIAMCINOLONE ACETONIDE 40 MG/ML
40 INJECTION, SUSPENSION INTRA-ARTICULAR; INTRAMUSCULAR ONCE
Status: COMPLETED | OUTPATIENT
Start: 2023-09-28 | End: 2023-09-28

## 2023-09-28 RX ORDER — KETOROLAC TROMETHAMINE 30 MG/ML
30 INJECTION, SOLUTION INTRAMUSCULAR; INTRAVENOUS ONCE
Status: COMPLETED | OUTPATIENT
Start: 2023-09-28 | End: 2023-09-28

## 2023-09-28 RX ADMIN — KETOROLAC TROMETHAMINE 30 MG: 30 INJECTION, SOLUTION INTRAMUSCULAR; INTRAVENOUS at 10:42

## 2023-09-28 RX ADMIN — TRIAMCINOLONE ACETONIDE 40 MG: 40 INJECTION, SUSPENSION INTRA-ARTICULAR; INTRAMUSCULAR at 10:42

## 2023-09-28 NOTE — PROGRESS NOTES
Chief Complaint  Muscle Pain/groin strain    Subjective            Asaf Chilel presents to [facility]  History of Present Illness  Pt has c/o possible pulled muscle in the groin area. Pt states this happened approximately 2 wks ago. Pt states he woke up one morning and had pain on the (L) side, groin area. Pt denies any injury, lifting, or pulling. Pt states he has been using advil and heat with little relief. Pt states it is hard to get dressed due to the pain. Pt is walking with a cane. Pt denies any lumps in the area. PT also reports he has a rash to left groin area as well he was using yeast infection cream with no relief.      Past Medical History:   Diagnosis Date    Anxiety     Arthritis 20 years    Asthma 5 years    Cancer     Cardiomyopathy     Cataract 5years    CHF (congestive heart failure) 2015    Surgery    COPD (chronic obstructive pulmonary disease)     Coronary artery disease     Diverticulosis 20 years    Eating disorder     Erectile dysfunction 5 years    Gastroesophageal reflux     Gout     Headache     High cholesterol     Hip pain     Hoarseness, chronic     Hypertension     Hypothyroid     AJ (iron deficiency anemia) 05/07/2015    Inflammatory bowel disease     Limb pain     Lumbago 09/03/2013    MRI shows abnormal appearance of bone marrow. Workup negative pet hem/onc    Myocardial infarction     Pancreatitis     Peripheral vascular disease 03/28/2015    Pneumonia     Sciatica 09/03/2013    Scoliosis     Tremor     Visual impairment 2015    Surgery       No Known Allergies     Past Surgical History:   Procedure Laterality Date    CARDIAC SURGERY  2015    COLONOSCOPY  2014    normal Dr. Bingham    COLONOSCOPY N/A 04/27/2022    Procedure: COLONOSCOPY WITH POLYPECTOMY;  Surgeon: Juarez Seo MD;  Location: [facility] ENDOSCOPY;  Service: General;  Laterality: N/A;  DIVERTICULOSIS, COLON POLYP    CORONARY ARTERY BYPASS GRAFT      CORONARY STENT PLACEMENT       ENDOSCOPY N/A 2022    Procedure: ESOPHAGOGASTRODUODENOSCOPY WITH BIOPSIES AND POLYPECTOMY;  Surgeon: Juarez Seo MD;  Location: [facility];  Service: General;  Laterality: N/A;  GASTRIC POLYPS    EYE SURGERY Right     cancer removed x 2    JOINT REPLACEMENT      LYMPH NODE BIOPSY      MANDIBLE SURGERY      cancer removed    SHOULDER SURGERY Right         Social History     Tobacco Use    Smoking status: Former     Packs/day: 1.00     Years: 44.00     Pack years: 44.00     Types: Cigarettes     Start date: 1956     Quit date: 2000     Years since quittin.7    Smokeless tobacco: Never   Substance Use Topics    Alcohol use: Not Currently     Comment: Quit        Family History   Problem Relation Age of Onset    Cancer Mother     Heart disease Mother     Hyperlipidemia Mother     Hypertension Mother     Cancer Father         Lung    Arthritis Father     Cancer Brother         Throat    Cancer Other     Heart failure Other     Other Other         spine problems     Cancer Brother         Brain    Cancer Brother         Neck    Thyroid disease Brother     Malig Hyperthermia Neg Hx         Current Outpatient Medications on File Prior to Visit   Medication Sig    albuterol sulfate  (90 Base) MCG/ACT inhaler Inhale 1 puff Every 4 (Four) Hours As Needed for Shortness of Air.    allopurinol (ZYLOPRIM) 300 MG tablet TAKE 1 TABLET BY MOUTH EVERY DAY    amitriptyline (ELAVIL) 25 MG tablet TAKE 1 TABLET BY MOUTH EVERYDAY AT BEDTIME    aspirin 81 MG EC tablet Take 1 tablet by mouth Daily. Last dose 22    atorvastatin (LIPITOR) 80 MG tablet TAKE 1 TABLET BY MOUTH EVERY DAY    busPIRone (BUSPAR) 10 MG tablet TAKE 1 TABLET BY MOUTH THREE TIMES A DAY    clotrimazole-betamethasone (LOTRISONE) 1-0.05 % cream APPLY TOPICALLY TO THE APPROPRIATE AREA TWICE A DAY AS DIRECTED    ferrous sulfate 325 (65 FE) MG tablet TAKE 1 TABLET BY MOUTH EVERY DAY WITH BREAKFAST     "Fluticasone-Umeclidin-Vilant (Trelegy Ellipta) 100-62.5-25 MCG/ACT inhaler Inhale 1 puff Daily. Indications: 2 box, first box Lot: [id number] EXP: 3/2024, 2nd box LOT: [id number] EXP: 6/2024    icosapent ethyl (Vascepa) 1 g capsule capsule Take 2 g by mouth 2 (Two) Times a Day With Meals.    ketoconazole (NIZORAL) 2 % shampoo Apply 1 application topically to the appropriate area as directed 1 (One) Time Per Week.    lisinopril (PRINIVIL,ZESTRIL) 40 MG tablet TAKE 1 TABLET BY MOUTH EVERY DAY    meclizine (ANTIVERT) 25 MG tablet TAKE 1 TABLET BY MOUTH 3 (THREE) TIMES A DAY AS NEEDED FOR NAUSEA.    multivitamin with minerals tablet tablet Take 1 tablet by mouth Daily.    omeprazole (priLOSEC) 20 MG capsule TAKE 2 CAPSULES BY MOUTH EVERY DAY    propranolol (INDERAL) 40 MG tablet TAKE 1 TABLET BY MOUTH THREE TIMES A DAY    spironolactone (ALDACTONE) 25 MG tablet Take 1 tablet daily except  on Sundays take 0.5 tablet    Synthroid 88 MCG tablet Take 1 tablet by mouth.     No current facility-administered medications on file prior to visit.       There are no preventive care reminders to display for this patient.    Objective     /58   Pulse 66   Ht 182.9 cm (72\")   Wt 98.4 kg (217 lb)   SpO2 96%   BMI 29.43 kg/m²       Physical Exam  Constitutional:       General: He is not in acute distress.     Appearance: Normal appearance. He is not ill-appearing.   HENT:      Head: Normocephalic and atraumatic.   Cardiovascular:      Rate and Rhythm: Normal rate and regular rhythm.      Heart sounds: Normal heart sounds. No murmur heard.  Pulmonary:      Effort: Pulmonary effort is normal. No respiratory distress.      Breath sounds: Normal breath sounds.   Chest:      Chest wall: No tenderness.   Abdominal:      General: Abdomen is flat. Bowel sounds are normal. There is no distension.      Palpations: Abdomen is soft. There is no mass.      Tenderness: There is no abdominal tenderness. There is no guarding.   Musculoskeletal:         " General: No swelling or tenderness. Normal range of motion.      Cervical back: Normal range of motion and neck supple.      Comments: Pain in left hip radiating to left groin with ambulation, pt using a cane. No masses palpated.   Skin:     General: Skin is warm and dry.      Findings: No rash.      Comments: Erythematous rash to left groin area   Neurological:      General: No focal deficit present.      Mental Status: He is alert and oriented to person, place, and time. Mental status is at baseline.      Gait: Gait normal.   Psychiatric:         Mood and Affect: Mood normal.         Behavior: Behavior normal.         Thought Content: Thought content normal.         Judgment: Judgment normal.         Result Review :                           Assessment and Plan        Diagnoses and all orders for this visit:    1. Inguinal strain, left, initial encounter (Primary)  -     triamcinolone acetonide (KENALOG-40) injection 40 mg  -     Discontinue: baclofen (LIORESAL) 10 MG tablet; Take 1 tablet by mouth 3 (Three) Times a Day As Needed for Muscle Spasms.  Dispense: 21 tablet; Refill: 0  -     baclofen (LIORESAL) 10 MG tablet; Take 1 tablet by mouth 3 (Three) Times a Day As Needed for Muscle Spasms.  Dispense: 21 tablet; Refill: 0  -     ketorolac (TORADOL) injection 30 mg    2. Dermatitis  Comments:  will send magic butt cream to [facility]  Orders:  -     Discontinue: Vitamins A & D (magic barrier cream); Apply 1 application  topically to the appropriate area as directed 2 (Two) Times a Day As Needed (rash).  Dispense: 60 g; Refill: 0  -     Vitamins A & D (magic barrier cream); Apply 1 application  topically to the appropriate area as directed 2 (Two) Times a Day As Needed (rash).  Dispense: 60 g; Refill: 0              Follow Up     Return if symptoms worsen or fail to improve.    Patient was given instructions and counseling regarding his condition or for health maintenance advice. Please see specific information pulled  into the AVS if appropriate.     Asaf Gilmore Ranjana  reports that he quit smoking about 23 years ago. His smoking use included cigarettes. He started smoking about 67 years ago. He has a 44.00 pack-year smoking history. He has never used smokeless tobacco..

## 2023-10-04 DIAGNOSIS — F41.9 ANXIETY: ICD-10-CM

## 2023-10-04 RX ORDER — BUSPIRONE HYDROCHLORIDE 10 MG/1
TABLET ORAL
Qty: 90 TABLET | Refills: 1 | Status: SHIPPED | OUTPATIENT
Start: 2023-10-04

## 2023-11-08 ENCOUNTER — TELEPHONE (OUTPATIENT)
Dept: FAMILY MEDICINE CLINIC | Facility: CLINIC | Age: 78
End: 2023-11-08
Payer: COMMERCIAL

## 2023-11-08 DIAGNOSIS — R06.02 SHORTNESS OF BREATH: ICD-10-CM

## 2023-11-08 NOTE — TELEPHONE ENCOUNTER
Caller: Asaf Chilel    Relationship: Self    Best call back number: [phone]     Requested Prescriptions:   Requested Prescriptions     Pending Prescriptions Disp Refills    Fluticasone-Umeclidin-Vilant (Trelegy Ellipta) 100-62.5-25 MCG/ACT inhaler 28 each 0     Sig: Inhale 1 puff Daily. Indications: 2 box, first box Lot: [id number] EXP: 3/2024, 2nd box LOT: [id number] EXP: 6/2024        Pharmacy where request should be sent: [facility]/PHARMACY #[id number] - [place], [place] - [address] [facility] - [phone]  - [phone] FX     Last office visit with prescribing clinician: 9/28/2023   Last telemedicine visit with prescribing clinician: Visit date not found   Next office visit with prescribing clinician: 11/16/2023     Additional details provided by patient: PATIENT SPOKE TO HIS INSURANCE WHO WILL COVER THIS FOR HIM IF SENT IN A 90 DAY SUPPLY    PATIENT IS ASKING FOR THIS TO BE RESENT TO THE PHARMACY    Does the patient have less than a 3 day supply:  [x] Yes  [] No    Would you like a call back once the refill request has been completed: [] Yes [] No    If the office needs to give you a call back, can they leave a voicemail: [] Yes [] No    Nilda Boothe, PCT   11/08/23 15:50 EST

## 2023-11-08 NOTE — TELEPHONE ENCOUNTER
Pt called requesting samples of trelegy. Pt states this is very expensive through insurance. Pt uses these PRN.     Last samples given 5/11/23  Please advise.

## 2023-11-09 RX ORDER — FLUTICASONE FUROATE, UMECLIDINIUM BROMIDE AND VILANTEROL TRIFENATATE 100; 62.5; 25 UG/1; UG/1; UG/1
1 POWDER RESPIRATORY (INHALATION)
Qty: 60 EACH | Refills: 0 | Status: SHIPPED | OUTPATIENT
Start: 2023-11-09

## 2023-11-16 ENCOUNTER — OFFICE VISIT (OUTPATIENT)
Dept: FAMILY MEDICINE CLINIC | Facility: CLINIC | Age: 78
End: 2023-11-16
Payer: COMMERCIAL

## 2023-11-16 VITALS
HEART RATE: 69 BPM | WEIGHT: 220 LBS | HEIGHT: 72 IN | DIASTOLIC BLOOD PRESSURE: 67 MMHG | BODY MASS INDEX: 29.8 KG/M2 | SYSTOLIC BLOOD PRESSURE: 134 MMHG | OXYGEN SATURATION: 99 %

## 2023-11-16 DIAGNOSIS — S76.212A INGUINAL STRAIN, LEFT, INITIAL ENCOUNTER: ICD-10-CM

## 2023-11-16 DIAGNOSIS — Z13.29 SCREENING FOR THYROID DISORDER: ICD-10-CM

## 2023-11-16 DIAGNOSIS — D64.9 ANEMIA, UNSPECIFIED TYPE: ICD-10-CM

## 2023-11-16 DIAGNOSIS — K21.9 GASTROESOPHAGEAL REFLUX DISEASE WITHOUT ESOPHAGITIS: ICD-10-CM

## 2023-11-16 DIAGNOSIS — I10 PRIMARY HYPERTENSION: ICD-10-CM

## 2023-11-16 DIAGNOSIS — M10.9 GOUT, UNSPECIFIED CAUSE, UNSPECIFIED CHRONICITY, UNSPECIFIED SITE: Primary | ICD-10-CM

## 2023-11-16 DIAGNOSIS — J45.20 MILD INTERMITTENT ASTHMA WITHOUT COMPLICATION: ICD-10-CM

## 2023-11-16 DIAGNOSIS — E78.5 HYPERLIPIDEMIA, UNSPECIFIED HYPERLIPIDEMIA TYPE: ICD-10-CM

## 2023-11-16 DIAGNOSIS — F41.9 ANXIETY: ICD-10-CM

## 2023-11-16 RX ORDER — BACLOFEN 10 MG/1
10 TABLET ORAL 3 TIMES DAILY PRN
Qty: 21 TABLET | Refills: 0 | Status: SHIPPED | OUTPATIENT
Start: 2023-11-16

## 2023-11-16 RX ORDER — FLUTICASONE FUROATE, UMECLIDINIUM BROMIDE AND VILANTEROL TRIFENATATE 100; 62.5; 25 UG/1; UG/1; UG/1
1 POWDER RESPIRATORY (INHALATION)
Qty: 180 EACH | Refills: 1 | Status: SHIPPED | OUTPATIENT
Start: 2023-11-16

## 2023-11-16 NOTE — PROGRESS NOTES
Chief Complaint  Gout, Hyperlipidemia, Anxiety, Hypertension, Heartburn, Hypothyroidism, Anemia, and Muscle Pain    Subjective            Asaf Chilel presents to [facility]  History of Present Illness  Pt is a f/u for HLD, Gout, HTN, GERD, anemia anxiety, and hypothyroidism. Pt has c/o ongoing muscle pain in (L) back thigh. Pt states he has been taking his baclofen and works well. Pt would like a refill.     Pt is due labs.    Pt is followed by Dr. Persaud for cardiology.              Past Medical History:   Diagnosis Date    Anxiety     Arthritis 20 years    Asthma 5 years    Cancer     Cardiomyopathy     Cataract 5years    CHF (congestive heart failure) 2015    Surgery    COPD (chronic obstructive pulmonary disease)     Coronary artery disease     Diverticulosis 20 years    Eating disorder     Erectile dysfunction 5 years    Gastroesophageal reflux     Gout     Headache     High cholesterol     Hip pain     Hoarseness, chronic     Hypertension     Hypothyroid     AJ (iron deficiency anemia) 05/07/2015    Inflammatory bowel disease     Limb pain     Lumbago 09/03/2013    MRI shows abnormal appearance of bone marrow. Workup negative pet hem/onc    Myocardial infarction     Pancreatitis     Peripheral vascular disease 03/28/2015    Pneumonia     Sciatica 09/03/2013    Scoliosis     Tremor     Visual impairment 2015    Surgery       No Known Allergies     Past Surgical History:   Procedure Laterality Date    CARDIAC SURGERY  2015    COLONOSCOPY  2014    normal Dr. Bingham    COLONOSCOPY N/A 04/27/2022    Procedure: COLONOSCOPY WITH POLYPECTOMY;  Surgeon: Juarez Seo MD;  Location: [facility] ENDOSCOPY;  Service: General;  Laterality: N/A;  DIVERTICULOSIS, COLON POLYP    CORONARY ARTERY BYPASS GRAFT      CORONARY STENT PLACEMENT  2015    ENDOSCOPY N/A 04/27/2022    Procedure: ESOPHAGOGASTRODUODENOSCOPY WITH BIOPSIES AND POLYPECTOMY;  Surgeon: Juarez Seo MD;  Location:   [facility];  Service: General;  Laterality: N/A;  GASTRIC POLYPS    EYE SURGERY Right     cancer removed x 2    JOINT REPLACEMENT      LYMPH NODE BIOPSY      MANDIBLE SURGERY      cancer removed    SHOULDER SURGERY Right         Social History     Tobacco Use    Smoking status: Former     Packs/day: 1.00     Years: 44.00     Additional pack years: 0.00     Total pack years: 44.00     Types: Cigarettes     Start date: 1956     Quit date: 2000     Years since quittin.8    Smokeless tobacco: Never   Substance Use Topics    Alcohol use: Not Currently     Comment: Quit        Family History   Problem Relation Age of Onset    Cancer Mother     Heart disease Mother     Hyperlipidemia Mother     Hypertension Mother     Cancer Father         Lung    Arthritis Father     Cancer Brother         Throat    Cancer Other     Heart failure Other     Other Other         spine problems     Cancer Brother         Brain    Cancer Brother         Neck    Thyroid disease Brother     Malig Hyperthermia Neg Hx         Current Outpatient Medications on File Prior to Visit   Medication Sig    albuterol sulfate  (90 Base) MCG/ACT inhaler Inhale 1 puff Every 4 (Four) Hours As Needed for Shortness of Air.    allopurinol (ZYLOPRIM) 300 MG tablet TAKE 1 TABLET BY MOUTH EVERY DAY    amitriptyline (ELAVIL) 25 MG tablet TAKE 1 TABLET BY MOUTH EVERYDAY AT BEDTIME    aspirin 81 MG EC tablet Take 1 tablet by mouth Daily. Last dose 22    atorvastatin (LIPITOR) 80 MG tablet TAKE 1 TABLET BY MOUTH EVERY DAY    busPIRone (BUSPAR) 10 MG tablet TAKE 1 TABLET BY MOUTH THREE TIMES A DAY    clotrimazole-betamethasone (LOTRISONE) 1-0.05 % cream APPLY TOPICALLY TO THE APPROPRIATE AREA TWICE A DAY AS DIRECTED    ferrous sulfate 325 (65 FE) MG tablet TAKE 1 TABLET BY MOUTH EVERY DAY WITH BREAKFAST    Fluticasone-Umeclidin-Vilant (Trelegy Ellipta) 100-62.5-25 MCG/ACT inhaler Inhale 1 puff Daily. Indications: 2 box, first box  "Lot: [id number] EXP: 7/2024, 2nd box LOT: [id number] EXP: 4/2025    icosapent ethyl (Vascepa) 1 g capsule capsule Take 2 g by mouth 2 (Two) Times a Day With Meals.    ketoconazole (NIZORAL) 2 % shampoo Apply 1 application topically to the appropriate area as directed 1 (One) Time Per Week.    lisinopril (PRINIVIL,ZESTRIL) 40 MG tablet TAKE 1 TABLET BY MOUTH EVERY DAY    meclizine (ANTIVERT) 25 MG tablet TAKE 1 TABLET BY MOUTH 3 (THREE) TIMES A DAY AS NEEDED FOR NAUSEA.    multivitamin with minerals tablet tablet Take 1 tablet by mouth Daily.    omeprazole (priLOSEC) 20 MG capsule TAKE 2 CAPSULES BY MOUTH EVERY DAY    propranolol (INDERAL) 40 MG tablet TAKE 1 TABLET BY MOUTH THREE TIMES A DAY    spironolactone (ALDACTONE) 25 MG tablet Take 1 tablet daily except  on Sundays take 0.5 tablet    Synthroid 88 MCG tablet Take 1 tablet by mouth.    Vitamins A & D (magic barrier cream) Apply 1 application  topically to the appropriate area as directed 2 (Two) Times a Day As Needed (rash).    [DISCONTINUED] baclofen (LIORESAL) 10 MG tablet Take 1 tablet by mouth 3 (Three) Times a Day As Needed for Muscle Spasms.    [DISCONTINUED] Fluticasone-Umeclidin-Vilant (Trelegy Ellipta) 100-62.5-25 MCG/ACT inhaler Inhale 1 puff Daily. Indications: 2 box, first box Lot: [id number] EXP: 3/2024, 2nd box LOT: [id number] EXP: 6/2024     No current facility-administered medications on file prior to visit.       There are no preventive care reminders to display for this patient.    Objective     /67   Pulse 69   Ht 182.9 cm (72\")   Wt 99.8 kg (220 lb)   SpO2 99%   BMI 29.84 kg/m²       Physical Exam  Constitutional:       General: He is not in acute distress.     Appearance: Normal appearance. He is not ill-appearing.   HENT:      Head: Normocephalic and atraumatic.      Right Ear: Tympanic membrane, ear canal and external ear normal.      Left Ear: Tympanic membrane, ear canal and external ear normal.      Nose: Nose normal.   Cardiovascular:      Rate " and Rhythm: Normal rate and regular rhythm.      Heart sounds: Normal heart sounds. No murmur heard.  Pulmonary:      Effort: Pulmonary effort is normal. No respiratory distress.      Breath sounds: Normal breath sounds.   Chest:      Chest wall: No tenderness.   Abdominal:      General: Abdomen is flat. Bowel sounds are normal. There is no distension.      Palpations: Abdomen is soft. There is no mass.      Tenderness: There is no abdominal tenderness. There is no guarding.   Musculoskeletal:         General: No swelling or tenderness. Normal range of motion.      Cervical back: Normal range of motion and neck supple.   Skin:     General: Skin is warm and dry.      Findings: No rash.   Neurological:      General: No focal deficit present.      Mental Status: He is alert and oriented to person, place, and time. Mental status is at baseline.      Gait: Gait normal.   Psychiatric:         Mood and Affect: Mood normal.         Behavior: Behavior normal.         Thought Content: Thought content normal.         Judgment: Judgment normal.           Result Review :                           Assessment and Plan        Diagnoses and all orders for this visit:    1. Gout, unspecified cause, unspecified chronicity, unspecified site (Primary)  Comments:  stable on allopurinaol 300mg, continue  Orders:  -     Uric acid; Future    2. Primary hypertension  Comments:  stable on lisinopril 40mg aldactone 25mg and inderal 40mg, conitnue, pt to continue f/u with Cardiology for mgmt  Orders:  -     CBC w AUTO Differential; Future    3. Gastroesophageal reflux disease without esophagitis  Comments:  stable on prilosec 20mg, continue    4. Anxiety  Comments:  stable on buspar 10mg and elavil 25mg, continue    5. Anemia, unspecified type  Comments:  stable on ferrous sulfate 325mg, continue  Orders:  -     Iron Profile; Future    6. Hyperlipidemia, unspecified hyperlipidemia type  Comments:  stable on Lipitor 80mg, continue  Orders:  -      Comprehensive metabolic panel; Future    7. Screening for thyroid disorder  -     TSH; Future    8. Inguinal strain, left, initial encounter  Comments:  stable on baclofen prn, continue  Orders:  -     baclofen (LIORESAL) 10 MG tablet; Take 1 tablet by mouth 3 (Three) Times a Day As Needed for Muscle Spasms.  Dispense: 21 tablet; Refill: 0    9. Mild intermittent asthma without complication  Comments:  stable on trelligey, continue  Orders:  -     Fluticasone-Umeclidin-Vilant (Trelegy Ellipta) 100-62.5-25 MCG/ACT inhaler; Inhale 1 puff Daily. Indications: 2 box, first box Lot: [id number] EXP: 3/2024, 2nd box LOT: [id number] EXP: 6/2024  Dispense: 180 each; Refill: 1              Follow Up     Return in about 6 months (around 5/16/2024).    Patient was given instructions and counseling regarding his condition or for health maintenance advice. Please see specific information pulled into the AVS if appropriate.     Asaf Chilel  reports that he quit smoking about 23 years ago. His smoking use included cigarettes. He started smoking about 67 years ago. He has a 44.00 pack-year smoking history. He has never used smokeless tobacco..

## 2023-11-20 ENCOUNTER — LAB (OUTPATIENT)
Dept: LAB | Facility: HOSPITAL | Age: 78
End: 2023-11-20
Payer: MEDICARE

## 2023-11-20 DIAGNOSIS — D64.9 ANEMIA, UNSPECIFIED TYPE: ICD-10-CM

## 2023-11-20 DIAGNOSIS — I10 PRIMARY HYPERTENSION: ICD-10-CM

## 2023-11-20 DIAGNOSIS — E78.5 HYPERLIPIDEMIA, UNSPECIFIED HYPERLIPIDEMIA TYPE: ICD-10-CM

## 2023-11-20 DIAGNOSIS — Z13.29 SCREENING FOR THYROID DISORDER: ICD-10-CM

## 2023-11-20 DIAGNOSIS — M10.9 GOUT, UNSPECIFIED CAUSE, UNSPECIFIED CHRONICITY, UNSPECIFIED SITE: ICD-10-CM

## 2023-11-20 LAB
ALBUMIN SERPL-MCNC: 4.3 G/DL (ref 3.5–5.2)
ALBUMIN/GLOB SERPL: 1.6 G/DL
ALP SERPL-CCNC: 75 U/L (ref 39–117)
ALT SERPL W P-5'-P-CCNC: 15 U/L (ref 1–41)
ANION GAP SERPL CALCULATED.3IONS-SCNC: 12.5 MMOL/L (ref 5–15)
AST SERPL-CCNC: 18 U/L (ref 1–40)
BASOPHILS # BLD AUTO: 0.04 10*3/MM3 (ref 0–0.2)
BASOPHILS NFR BLD AUTO: 0.4 % (ref 0–1.5)
BILIRUB SERPL-MCNC: 0.6 MG/DL (ref 0–1.2)
BUN SERPL-MCNC: 17 MG/DL (ref 8–23)
BUN/CREAT SERPL: 14.3 (ref 7–25)
CALCIUM SPEC-SCNC: 9.6 MG/DL (ref 8.6–10.5)
CHLORIDE SERPL-SCNC: 102 MMOL/L (ref 98–107)
CO2 SERPL-SCNC: 23.5 MMOL/L (ref 22–29)
CREAT SERPL-MCNC: 1.19 MG/DL (ref 0.76–1.27)
DEPRECATED RDW RBC AUTO: 42.1 FL (ref 37–54)
EGFRCR SERPLBLD CKD-EPI 2021: 62.9 ML/MIN/1.73
EOSINOPHIL # BLD AUTO: 0.15 10*3/MM3 (ref 0–0.4)
EOSINOPHIL NFR BLD AUTO: 1.6 % (ref 0.3–6.2)
ERYTHROCYTE [DISTWIDTH] IN BLOOD BY AUTOMATED COUNT: 13.2 % (ref 12.3–15.4)
GLOBULIN UR ELPH-MCNC: 2.7 GM/DL
GLUCOSE SERPL-MCNC: 101 MG/DL (ref 65–99)
HCT VFR BLD AUTO: 39.2 % (ref 37.5–51)
HGB BLD-MCNC: 13.6 G/DL (ref 13–17.7)
IMM GRANULOCYTES # BLD AUTO: 0.09 10*3/MM3 (ref 0–0.05)
IMM GRANULOCYTES NFR BLD AUTO: 1 % (ref 0–0.5)
IRON 24H UR-MRATE: 59 MCG/DL (ref 59–158)
IRON SATN MFR SERPL: 22 % (ref 20–50)
LYMPHOCYTES # BLD AUTO: 2.35 10*3/MM3 (ref 0.7–3.1)
LYMPHOCYTES NFR BLD AUTO: 25.5 % (ref 19.6–45.3)
MCH RBC QN AUTO: 30.6 PG (ref 26.6–33)
MCHC RBC AUTO-ENTMCNC: 34.7 G/DL (ref 31.5–35.7)
MCV RBC AUTO: 88.1 FL (ref 79–97)
MONOCYTES # BLD AUTO: 0.61 10*3/MM3 (ref 0.1–0.9)
MONOCYTES NFR BLD AUTO: 6.6 % (ref 5–12)
NEUTROPHILS NFR BLD AUTO: 5.97 10*3/MM3 (ref 1.7–7)
NEUTROPHILS NFR BLD AUTO: 64.9 % (ref 42.7–76)
NRBC BLD AUTO-RTO: 0 /100 WBC (ref 0–0.2)
PLATELET # BLD AUTO: 239 10*3/MM3 (ref 140–450)
PMV BLD AUTO: 10 FL (ref 6–12)
POTASSIUM SERPL-SCNC: 4.3 MMOL/L (ref 3.5–5.2)
PROT SERPL-MCNC: 7 G/DL (ref 6–8.5)
RBC # BLD AUTO: 4.45 10*6/MM3 (ref 4.14–5.8)
SODIUM SERPL-SCNC: 138 MMOL/L (ref 136–145)
TIBC SERPL-MCNC: 264 MCG/DL (ref 298–536)
TRANSFERRIN SERPL-MCNC: 177 MG/DL (ref 200–360)
TSH SERPL DL<=0.05 MIU/L-ACNC: 1.42 UIU/ML (ref 0.27–4.2)
URATE SERPL-MCNC: 4.4 MG/DL (ref 3.4–7)
WBC NRBC COR # BLD AUTO: 9.21 10*3/MM3 (ref 3.4–10.8)

## 2023-11-20 PROCEDURE — 84550 ASSAY OF BLOOD/URIC ACID: CPT

## 2023-11-20 PROCEDURE — 85025 COMPLETE CBC W/AUTO DIFF WBC: CPT

## 2023-11-20 PROCEDURE — 83540 ASSAY OF IRON: CPT

## 2023-11-20 PROCEDURE — 36415 COLL VENOUS BLD VENIPUNCTURE: CPT

## 2023-11-20 PROCEDURE — 84466 ASSAY OF TRANSFERRIN: CPT

## 2023-11-20 PROCEDURE — 80053 COMPREHEN METABOLIC PANEL: CPT

## 2023-11-20 PROCEDURE — 84443 ASSAY THYROID STIM HORMONE: CPT

## 2023-11-20 NOTE — PROGRESS NOTES
Progress Note      Patient Name: Asaf Chilel   Patient ID: [id number]   Sex: Male   YOB: 1945    Primary Care Provider: Concetta BERRIOS   Referring Provider: Concetta BERRIOS    Visit Date: May 20, 2020    Provider: YASH Gagnon   Location: [facility]   Location Address: [address] [place] [address], [address]  [place], [place]  [id number]   Location Phone: [phone]          History Of Present Illness  Video Conferencing Visit  Asaf Chilel is a 74 year old /White male who is presenting for evaluation via video conferencing via [employer] Verbal consent obtained before beginning visit.   The following staff were present during this visit: Concetta BERRIOS   Asaf Chilel is a 74 year old /White male who presents for evaluation and treatment of:      reports wheezing has not improved. Reports deep cough. He is using albuterol neb and [facility] Atrovent but they have not helped the wheezing. Denies hx of allergy problems. He is a former smoker from the age of 13-14 till age 51.  Denies fever, chills, body aches, change of taste or smell, h/a or sore throat. Denies sick contacts w/COVID19. Sent to the [facility] Clinic to R/O COVID19.       Past Medical History  Disease Name Date Onset Notes   Anemia, iron deficiency 05/07/2015 --    Anxiety --  --    Gastroesophageal reflux --  --    Gout --  --    High cholesterol --  --    Hip Pain --  --    Hypertension --  --    Limb Pain --  --    Lumbago/low back pain 09/03/2013 MRI shows abnormal appearance of bone marrow. Workup negative per Hem/onc.   Peripheral vascular disease 03/28/2015 --    Sciatica 09/03/2013 --    Tremors --  --          Past Surgical History  Procedure Name Date Notes   Shoulder surgery --  --          Medication List  Name Date Started Instructions   albuterol sulfate 2.5 mg /3 mL (0.083 %) inhalation solution for nebulization 05/13/2020 inhale 3 milliliters (2.5 mg) by nebulization route  3 times per day as needed   albuterol sulfate 90 mcg/actuation inhalation HFA aerosol inhaler 04/09/2020 INHALE 1 TO 2 PUFFS BY MOUTH EVERY 6 HOURS AS NEEDED   allopurinol 300 mg oral tablet 10/29/2019 TAKE ONE TABLET BY MOUTH ONE TIME A DAY   amitriptyline 25 mg oral tablet 11/29/2019 TAKE 1 TABLET BY MOUTH ONCE DAILY AT BEDTIME   aspirin 81 mg oral tablet,delayed release (DR/EC) 11/27/2013 take 1 tablet by oral route daily for 90 days   atorvastatin 40 mg oral tablet 01/09/2020 TAKE 1 TABLET BY MOUTH EVERYDAY AT BEDTIME   Atrovent HFA 17 mcg/actuation inhalation HFA aerosol inhaler 04/29/2020 inhale 2 puffs (34 mcg) by inhalation route 4 times per day   buspirone 10 mg oral tablet 06/17/2019 TAKE 1 TABLET BY ORAL ROUTE 3 TIMES A DAY FOR 90 DAYS   Daily Multi-Vitamin oral tablet 11/27/2013 take 1 tablet by oral route daily for 90 days   Fish Oil oral  --    Lipitor 40 mg oral tablet 09/24/2019 TAKE 1 TABLET BY MOUTH EVERYDAY AT BEDTIME   lisinopril 40 mg oral tablet 10/28/2019 TAKE 1 TABLET BY MOUTH EVERY DAY   meclizine 25 mg oral tablet 05/15/2020 TAKE 1 TABLET BY MOUTH FOUR TIMES A DAY   omeprazole 20 mg oral capsule,delayed release(DR/EC) 02/05/2020 TAKE 2 CAPSULES (40 MG) BY ORAL ROUTE ONCE DAILY BEFORE A MEAL FOR 90 DAYS   ProAir HFA 90 mcg/actuation inhalation HFA aerosol inhaler 01/31/2020 inhale 1 - 2 puffs (90 - 180 mcg) by inhalation route every 6 hours as needed   propranolol 40 mg oral tablet 01/15/2020 TAKE 1 TABLET BY MOUTH 3 TIMES PER DAY   spironolactone 25 mg oral tablet 05/18/2020 TAKE 1 TABLET BY MOUTH EVERY OTHER DAY, OR AS DIRECTED   Synthroid 50 mcg oral tablet  take 1 tablet (50 mcg) by oral route once daily   Zyrtec 10 mg oral tablet 02/24/2017 take 1 tablet (10 mg) by oral route once daily prn allergies         Allergy List  Allergen Name Date Reaction Notes   NO KNOWN DRUG ALLERGIES --  --  --          Family Medical History  Disease Name Relative/Age Notes   Cancer, Unspecified  --     Heart failure  --    Spine Problems  --          Social History  Finding Status Start/Stop Quantity Notes   Active but no formal exercise --  --/-- --  --    Alcohol Never --/-- --  04/29/2020 - 01/31/2020 -     --  --/-- --  --    No known infection risk --  --/-- --  --    Tobacco Former 16/52 1 pack Quit smoking 20 yrs ago         Immunizations  NameDate Admin Mfg Trade Name Lot Number Route Inj VIS Given VIS Publication   DTaP11/27/2013 [facility] TRIPEDIA [id number] IM LD 11/27/2013    Comments: tolerated well.   Npsuojcym98/24/2016 [employer] Fluarix, quadrivalent, preservative free [id number] IM RD 10/24/2016 08/07/2015   Comments: tolerated well   Zhuswwfvx36/06/2014 [employer] Fluarix, quadrivalent, preservative free [id number] IM RD 10/06/2014 07/02/2012   Comments: Pt tolerated well   Vfudygyra19/01/2013 [employer] Fluarix-PF > 3 Years [id number] IM NE 11/27/2013 07/02/2012   Comments:    Lubpwpvhrshf60/27/2013 MSD PNEUMOVAX 23 [id number] IM RD 11/27/2013 10/06/2009   Comments: tolerated well.   Pdtzqwzb29/01/2009 MSD ZOSTAVAX  SC NE 11/27/2013    Comments:          Review of Systems  · Constitutional  o Denies  o : fever, fatigue, weight loss, weight gain  · Cardiovascular  o Denies  o : lower extremity edema, claudication, chest pressure, palpitations  · Respiratory  o Admits  o : shortness of breath, wheezing, cough  o Denies  o : hemoptysis, dyspnea on exertion  · Gastrointestinal  o Denies  o : nausea, vomiting, diarrhea, constipation, abdominal pain      Physical Examination  · Constitutional  o Appearance  o : alert, in no acute distress, well developed, well-nourished  · Respiratory  o Auscultation of Lungs  o : wheezing present through the phone  · Skin and Subcutaneous Tissue  o General Inspection  o : no rashes, normal skin color, warm and dry  · Neurologic  o Mental Status Examination  o : alert and oriented to time, place, and person. Gait and Station: normal gait, able to stand without difficulty  · Psychiatric  o Judgement and  Insight  o : judgment and insight intact  o Mood and Affect  o : normal mood and affect          Assessment  · Cough     786.2/R05  · Wheezing     786.07/R06.2  c/o wheezing x 1 month. He is using albuterol neb and Atrovent but they have not helped-his wheezing is present through the phone. Denies hx of allergic rhinitis, Asthma or COPD. He is a former smoker-states he quit 21 yrs ago. Denies fever, chills, body aches, change of taste or smell, h/a or sore throat. Denies sick contacts w/COVID19. He has had 2 negative chest x-rays. Sent to the Respiratory Clinic to R/O COVID19. Ordered a CT chest and pulmonary consult. Gave trial of Trelegy.   · Former smoker     V15.82/Z87.891      Plan  · Orders  o ACO-39: Current medications updated and reviewed () - - 05/20/2020  o [facility] NCOV2 (send-out) (52497) - 786.07/R06.2, 786.2/R05 - 05/20/2020  o CT Chest with IV Contrast [facility] (10362) - 786.07/R06.2, 786.2/R05 - 05/20/2020  o PULMONARY CONSULTATION (PULMO) - 786.07/R06.2, 786.2/R05, V15.82/Z87.891 - 05/20/2020   will need PFT-former smoker-cough >1month w/neg chest x-ray.  · Medications  o Trelegy Ellipta 100-62.5-25 mcg inhalation blister with device   SIG: inhale 1 puff by inhalation route once daily at the same time each day   DISP: (1) 28 ct blist pack with 0 refills  Prescribed on 05/20/2020     · Instructions  o Patient was educated/instructed on their diagnosis, treatment and medications prior to discharge from the clinic today.  o Patient instructed to seek medical attention urgently for new or worsening symptoms.  o Call the office with any concerns or questions.  o Discussed Covid-19 precautions including, but not limited to, social distancing, avoid touching your face, and hand washing.   o follow up pending COVID testing and CT chest  · Disposition  o Call or Return if symptoms worsen or persist.            Electronically Signed by: YASH GagnonAuthor on May 20, 2020 02:05:35 PM   dependent (less than 25% patients effort)

## 2023-11-25 DIAGNOSIS — M10.9 GOUT, UNSPECIFIED CAUSE, UNSPECIFIED CHRONICITY, UNSPECIFIED SITE: ICD-10-CM

## 2023-11-25 DIAGNOSIS — F41.9 ANXIETY: ICD-10-CM

## 2023-11-27 RX ORDER — AMITRIPTYLINE HYDROCHLORIDE 25 MG/1
TABLET, FILM COATED ORAL
Qty: 90 TABLET | Refills: 1 | Status: SHIPPED | OUTPATIENT
Start: 2023-11-27

## 2023-11-27 RX ORDER — MECLIZINE HYDROCHLORIDE 25 MG/1
TABLET ORAL
Qty: 270 TABLET | Refills: 1 | Status: SHIPPED | OUTPATIENT
Start: 2023-11-27

## 2023-11-27 RX ORDER — ALLOPURINOL 300 MG/1
TABLET ORAL
Qty: 90 TABLET | Refills: 1 | Status: SHIPPED | OUTPATIENT
Start: 2023-11-27

## 2023-12-02 DIAGNOSIS — F41.9 ANXIETY: ICD-10-CM

## 2023-12-04 RX ORDER — BUSPIRONE HYDROCHLORIDE 10 MG/1
TABLET ORAL
Qty: 90 TABLET | Refills: 1 | Status: SHIPPED | OUTPATIENT
Start: 2023-12-04

## 2023-12-06 ENCOUNTER — TELEPHONE (OUTPATIENT)
Dept: FAMILY MEDICINE CLINIC | Facility: CLINIC | Age: 78
End: 2023-12-06
Payer: COMMERCIAL

## 2023-12-06 NOTE — TELEPHONE ENCOUNTER
Caller: Asaf Chilel    Relationship: Self    Best call back number: [phone]     What medication are you requesting: PAIN MEDICATION    What are your current symptoms: NECK PAIN/STIFFNESS    How long have you been experiencing symptoms: TWO WEEKS    Have you had these symptoms before:    [] Yes  [x] No    Have you been treated for these symptoms before:   [] Yes  [x] No    If a prescription is needed, what is your preferred pharmacy and phone number: [id number] - [place] - [address]  [phone] [facility] [phone] FX

## 2023-12-06 NOTE — TELEPHONE ENCOUNTER
Naheed does not do pain medication. Pt aware    Pt states the baclofen is not helping. Pt states the pain is in his neck and at times can't turn head. Offered pt appt for evaluation. Pt declined at this time. Will call back if gets worse. Advise pt to use heat, ice, and stretching.

## 2023-12-08 DIAGNOSIS — I10 HYPERTENSION, UNSPECIFIED TYPE: ICD-10-CM

## 2023-12-08 RX ORDER — PROPRANOLOL HYDROCHLORIDE 40 MG/1
TABLET ORAL
Qty: 270 TABLET | Refills: 1 | Status: SHIPPED | OUTPATIENT
Start: 2023-12-08

## 2023-12-08 RX ORDER — LISINOPRIL 40 MG/1
TABLET ORAL
Qty: 90 TABLET | Refills: 1 | Status: SHIPPED | OUTPATIENT
Start: 2023-12-08

## 2023-12-22 ENCOUNTER — HOSPITAL ENCOUNTER (OUTPATIENT)
Dept: GENERAL RADIOLOGY | Facility: HOSPITAL | Age: 78
Discharge: HOME | End: 2023-12-22
Payer: COMMERCIAL

## 2023-12-22 ENCOUNTER — TELEPHONE (OUTPATIENT)
Dept: FAMILY MEDICINE CLINIC | Facility: CLINIC | Age: 78
End: 2023-12-22
Payer: COMMERCIAL

## 2023-12-22 ENCOUNTER — OFFICE VISIT (OUTPATIENT)
Dept: FAMILY MEDICINE CLINIC | Facility: CLINIC | Age: 78
End: 2023-12-22
Payer: COMMERCIAL

## 2023-12-22 VITALS
WEIGHT: 215 LBS | HEIGHT: 72 IN | SYSTOLIC BLOOD PRESSURE: 120 MMHG | DIASTOLIC BLOOD PRESSURE: 71 MMHG | BODY MASS INDEX: 29.12 KG/M2 | OXYGEN SATURATION: 97 % | HEART RATE: 74 BPM

## 2023-12-22 DIAGNOSIS — M25.511 BILATERAL SHOULDER PAIN, UNSPECIFIED CHRONICITY: ICD-10-CM

## 2023-12-22 DIAGNOSIS — M25.512 BILATERAL SHOULDER PAIN, UNSPECIFIED CHRONICITY: ICD-10-CM

## 2023-12-22 DIAGNOSIS — M54.12 CERVICAL RADICULOPATHY: ICD-10-CM

## 2023-12-22 DIAGNOSIS — M54.12 CERVICAL RADICULOPATHY: Primary | ICD-10-CM

## 2023-12-22 PROCEDURE — 73030 X-RAY EXAM OF SHOULDER: CPT

## 2023-12-22 PROCEDURE — 72040 X-RAY EXAM NECK SPINE 2-3 VW: CPT

## 2023-12-22 RX ORDER — MELOXICAM 15 MG/1
15 TABLET ORAL DAILY
Qty: 14 TABLET | Refills: 0 | Status: SHIPPED | OUTPATIENT
Start: 2023-12-22

## 2023-12-22 RX ORDER — TIZANIDINE HYDROCHLORIDE 2 MG/1
2 CAPSULE, GELATIN COATED ORAL 3 TIMES DAILY PRN
Qty: 14 CAPSULE | Refills: 0 | Status: SHIPPED | OUTPATIENT
Start: 2023-12-22

## 2023-12-22 RX ORDER — PREDNISONE 5 MG/1
5 TABLET ORAL DAILY
Qty: 5 TABLET | Refills: 0 | Status: SHIPPED | OUTPATIENT
Start: 2023-12-22

## 2023-12-22 NOTE — PROGRESS NOTES
I have reviewed Mr. Chilel's imaging results.    The cervical spine x-ray demonstrates multilevel degenerative changes consistent with arthritis.  There are milder degenerative changes in his left and right shoulder.  He may end up needing an MRI of his right shoulder if he does not improve with conservative treatment because mild arthritis alone should not account for the symptoms he is experiencing.    Can we call him and let him know?    Thank you,    Chris Low    ?  This document has been electronically signed by Chris Low MD on December 22, 2023 12:44 EST

## 2023-12-22 NOTE — PROGRESS NOTES
Subjective:       Asaf Chilel is a 78 y.o. male who presents for neck and bilateral shoulder pain. He is normally seen by my colleague, Naheed Mane.  I am seeing him today for an acute visit.    Mr. Chilel reports neck and shoulder pain. His current symptoms are of 3 weeks duration.  His symptoms initially began his neck pain which she attempted to treat with a visit to the chiropractor who manipulated his neck and shoulders.  Since that time, Mr. Chilel notes persistent neck and bilateral shoulder pain.    In terms of the shoulder, Mr. Chilel reports his pain is worse on the right side.  He rates it at a 10 out of 10 and characterizes it as sharp in quality.  He has had no recent injury other than the chiropractic manipulation but says that many years ago he may have torn his rotator cuff playing softball.  Active movement exacerbates his pain and restricts his range of motion.  He has tried to treat his symptoms with heat, ice, Tylenol and ibuprofen with limited relief.  He has a history of gout but this appears well-controlled with normal uric acid on medication and he is not having heat, swelling, or erythema associated with the shoulder pain.  He denies nerve pain down the shoulder or into the arm or hand.    In terms of the neck, Mr. Chilel reports stiffness in his neck.  He denies nerve pain traveling down the neck.  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    Mr. Chilel has normal active cervical range of movement without rigidity or pain.  On  musculoskeletal exam, Mr. Chilel has normal cervical range of motion and no passive or active limitations of movement.  No pain elicited with movement.  No neck rigidity.  Mr. Chilel has significant decreased passive and active range of motion of the right and left shoulder, significantly worse on the right.  Pain is worse with active range of motion and is decreased with my passive manipulation. On neurologic exam, I note a slow, shuffling gait.  No decrease sensation in upper extremity bilaterally no decreased  strength in upper extremity.  No decrease in sensation in lower extremity bilaterally, no decreased strength in lower extremities bilaterally.    I would treat Mr. Chilel conservatively with stronger anti-inflammatory with Mobic 15 mg for short course of 2 weeks (given his history of chronic heart failure I would not go longer than this), Zanaflex for muscle relaxer, and a very short course of steroids for 5 days (he tells me he is on acid suppression) given the degree of inflammation in his shoulder.    I do not see that Mr. Chilel has any imaging with us on file. I would start with XR of cervical spine and shoulders bilaterally. It looks like he has a history of hypothyroidism in the chart.      I would see him back in 2 weeks.  If his symptoms have improved but have not abated, would consider referral to physical therapy based on what imaging shows us.  Given his restricted passive and active range of motion of the shoulder, he may have adhesive capsulitis and an x-ray would not diagnose this. He says that if he does need physical therapy he would prefer to be referred to physical therapy in [place].    We discussed red flag symptoms that would prompt him to present to ED for urgent evaluation prior to our next appointment.  In the absence of this, I would see him in 2 weeks.      The following portions of the patient's history were reviewed and updated as appropriate: allergies, current  medications, past family history, past medical history, past social history, past surgical history, and problem list.    Past Medical Hx:  Past Medical History:   Diagnosis Date    Anxiety     Arthritis 20 years    Asthma 5 years    Cancer     Cardiomyopathy     Cataract 5years    CHF (congestive heart failure) 2015    Surgery    COPD (chronic obstructive pulmonary disease)     Coronary artery disease     Diverticulosis 20 years    Eating disorder     Erectile dysfunction 5 years    Gastroesophageal reflux     Gout     Headache     High cholesterol     Hip pain     Hoarseness, chronic     Hypertension     Hypothyroid     AJ (iron deficiency anemia) 05/07/2015    Inflammatory bowel disease     Limb pain     Lumbago 09/03/2013    MRI shows abnormal appearance of bone marrow. Workup negative pet hem/onc    Myocardial infarction     Pancreatitis     Peripheral vascular disease 03/28/2015    Pneumonia     Sciatica 09/03/2013    Scoliosis     Tremor     Visual impairment 2015    Surgery       Past Surgical Hx:  Past Surgical History:   Procedure Laterality Date    CARDIAC SURGERY  2015    COLONOSCOPY  2014    normal Dr. Bingham    COLONOSCOPY N/A 04/27/2022    Procedure: COLONOSCOPY WITH POLYPECTOMY;  Surgeon: Juarez Seo MD;  Location: [facility] ENDOSCOPY;  Service: General;  Laterality: N/A;  DIVERTICULOSIS, COLON POLYP    CORONARY ARTERY BYPASS GRAFT      CORONARY STENT PLACEMENT  2015    ENDOSCOPY N/A 04/27/2022    Procedure: ESOPHAGOGASTRODUODENOSCOPY WITH BIOPSIES AND POLYPECTOMY;  Surgeon: Juarez Seo MD;  Location: [facility] ENDOSCOPY;  Service: General;  Laterality: N/A;  GASTRIC POLYPS    EYE SURGERY Right     cancer removed x 2    JOINT REPLACEMENT      LYMPH NODE BIOPSY      MANDIBLE SURGERY      cancer removed    SHOULDER SURGERY Right        Current Meds:    Current Outpatient Medications:     albuterol sulfate  (90 Base) MCG/ACT inhaler, Inhale 1 puff Every 4 (Four) Hours As Needed for Shortness  of Air., Disp: 6.7 g, Rfl: 2    allopurinol (ZYLOPRIM) 300 MG tablet, TAKE 1 TABLET BY MOUTH EVERY DAY, Disp: 90 tablet, Rfl: 1    amitriptyline (ELAVIL) 25 MG tablet, TAKE 1 TABLET BY MOUTH EVERYDAY AT BEDTIME, Disp: 90 tablet, Rfl: 1    aspirin 81 MG EC tablet, Take 1 tablet by mouth Daily. Last dose Friday 4/22/22, Disp: , Rfl:     atorvastatin (LIPITOR) 80 MG tablet, TAKE 1 TABLET BY MOUTH EVERY DAY, Disp: 90 tablet, Rfl: 1    busPIRone (BUSPAR) 10 MG tablet, TAKE 1 TABLET BY MOUTH THREE TIMES A DAY, Disp: 90 tablet, Rfl: 1    clotrimazole-betamethasone (LOTRISONE) 1-0.05 % cream, APPLY TOPICALLY TO THE APPROPRIATE AREA TWICE A DAY AS DIRECTED, Disp: 45 g, Rfl: 0    ferrous sulfate 325 (65 FE) MG tablet, TAKE 1 TABLET BY MOUTH EVERY DAY WITH BREAKFAST, Disp: 90 tablet, Rfl: 1    Fluticasone-Umeclidin-Vilant (Trelegy Ellipta) 100-62.5-25 MCG/ACT inhaler, Inhale 1 puff Daily. Indications: 2 box, first box Lot: [id number] EXP: 7/2024, 2nd box LOT: [id number] EXP: 4/2025, Disp: 28 each, Rfl: 2    icosapent ethyl (Vascepa) 1 g capsule capsule, Take 2 g by mouth 2 (Two) Times a Day With Meals., Disp: 360 capsule, Rfl: 3    ketoconazole (NIZORAL) 2 % shampoo, Apply 1 application topically to the appropriate area as directed 1 (One) Time Per Week., Disp: , Rfl:     lisinopril (PRINIVIL,ZESTRIL) 40 MG tablet, TAKE 1 TABLET BY MOUTH EVERY DAY, Disp: 90 tablet, Rfl: 1    meclizine (ANTIVERT) 25 MG tablet, TAKE 1 TABLET BY MOUTH THREE TIMES A DAY AS NEEDED FOR NAUSEA, Disp: 270 tablet, Rfl: 1    multivitamin with minerals tablet tablet, Take 1 tablet by mouth Daily., Disp: , Rfl:     omeprazole (priLOSEC) 20 MG capsule, TAKE 2 CAPSULES BY MOUTH EVERY DAY, Disp: 180 capsule, Rfl: 1    propranolol (INDERAL) 40 MG tablet, TAKE 1 TABLET BY MOUTH THREE TIMES A DAY, Disp: 270 tablet, Rfl: 1    spironolactone (ALDACTONE) 25 MG tablet, Take 1 tablet daily except  on Sundays take 0.5 tablet, Disp: 90 tablet, Rfl: 3    Synthroid 88 MCG tablet,  Take 1 tablet by mouth., Disp: , Rfl:     Vitamins A & D (magic barrier cream), Apply 1 application  topically to the appropriate area as directed 2 (Two) Times a Day As Needed (rash)., Disp: 60 g, Rfl: 0    Fluticasone-Umeclidin-Vilant (Trelegy Ellipta) 100-62.5-25 MCG/ACT inhaler, Inhale 1 puff Daily. Indications: 2 box, first box Lot: [id number] EXP: 3/2024, 2nd box LOT: [id number] EXP: 2024 (Patient not taking: Reported on 2023), Disp: 180 each, Rfl: 1    meloxicam (Mobic) 15 MG tablet, Take 1 tablet by mouth Daily., Disp: 14 tablet, Rfl: 0    predniSONE (DELTASONE) 5 MG tablet, Take 1 tablet by mouth Daily., Disp: 5 tablet, Rfl: 0    TiZANidine (Zanaflex) 2 MG capsule, Take 1 capsule by mouth 3 (Three) Times a Day As Needed for Muscle Spasms., Disp: 14 capsule, Rfl: 0    Allergies:  No Known Allergies    Family Hx:  Family History   Problem Relation Age of Onset    Cancer Mother     Heart disease Mother     Hyperlipidemia Mother     Hypertension Mother     Cancer Father         Lung    Arthritis Father     Cancer Brother         Throat    Cancer Other     Heart failure Other     Other Other         spine problems     Cancer Brother         Brain    Cancer Brother         Neck    Thyroid disease Brother     Malig Hyperthermia Neg Hx         Social History:  Social History     Socioeconomic History    Marital status:    Tobacco Use    Smoking status: Former     Packs/day: 1.00     Years: 44.00     Additional pack years: 0.00     Total pack years: 44.00     Types: Cigarettes     Start date: 1956     Quit date: 2000     Years since quittin.9    Smokeless tobacco: Never   Vaping Use    Vaping Use: Never used   Substance and Sexual Activity    Alcohol use: Not Currently     Comment: Quit     Drug use: Never    Sexual activity: Not Currently     Partners: Female     Birth control/protection: Pill, Surgical     Comment: O       Review of Systems  Review of Systems   Constitutional:  Negative for  "chills, fatigue, fever and unexpected weight change.   Eyes:  Negative for visual disturbance.   Musculoskeletal:  Positive for arthralgias (shoulder pain, bilateral), neck pain and neck stiffness. Gait problem: baseline.      Objective:     /71   Pulse 74   Ht 182.9 cm (72\")   Wt 97.5 kg (215 lb)   SpO2 97%   BMI 29.16 kg/m²   Physical Exam  Cardiovascular:      Rate and Rhythm: Normal rate and regular rhythm.   Pulmonary:      Effort: Pulmonary effort is normal. No respiratory distress.   Musculoskeletal:      Comments: Mr. Chilel has normal cervical range of motion and no passive or active limitations of movement.  No pain elicited with movement.  No neck rigidity.  Mr. Chilel has significant decreased passive and active range of motion of the right and left shoulder, significantly worse on the right.  Pain is worse with active range of motion and is decreased with my passive manipulation.   Skin:     General: Skin is warm and dry.      Coloration: Skin is not jaundiced.   Neurological:      Mental Status: He is alert.      Motor: No weakness.      Comments: Slow, shuffling gait.  No decrease sensation in upper extremity bilaterally no decreased  strength in upper extremity.  No decrease in sensation in lower extremity bilaterally, no decreased strength in lower extremities bilaterally.   Psychiatric:         Mood and Affect: Mood normal.         Behavior: Behavior normal.          Assessment/Plan:     Diagnoses and all orders for this visit:    1. Cervical radiculopathy (Primary)  2. Bilateral shoulder pain, unspecified chronicity      Mr. Chilel reports neck and shoulder pain. His current symptoms are of 3 weeks duration.  His symptoms initially began his neck pain which she attempted to treat with a visit to the chiropractor who manipulated his neck and shoulders.  Since that time, Mr. Chilel notes persistent neck and bilateral shoulder pain.    In terms of the shoulder, Mr. Chilel " reports his pain is worse on the right side.  He rates it at a 10 out of 10 and characterizes it as sharp in quality.  He has had no recent injury other than the chiropractic manipulation but says that many years ago he may have torn his rotator cuff playing softball.  Active movement exacerbates his pain and restricts his range of motion.  He has tried to treat his symptoms with heat, ice, Tylenol and ibuprofen with limited relief.  He has a history of gout but this appears well-controlled with normal uric acid on medication and he is not having heat, swelling, or erythema associated with the shoulder pain.  He denies nerve pain down the shoulder or into the arm or hand.    In terms of the neck, Mr. Chilel reports stiffness in his neck.  He denies nerve pain traveling down the neck.  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    Mr. Chilel has normal active cervical range of movement without rigidity or pain.  On musculoskeletal exam, Mr. Chilel has normal cervical range of motion and no passive or active limitations of movement.  No pain elicited with movement.  No neck rigidity.  Mr. Chilel has significant decreased passive and active range of motion of the right and left shoulder, significantly worse on the right.  Pain is worse with active range of motion and is decreased with my passive manipulation. On neurologic exam, I note a slow, shuffling gait.  No decrease sensation in upper extremity bilaterally no decreased  strength in upper extremity.  No decrease in  sensation in lower extremity bilaterally, no decreased strength in lower extremities bilaterally.    I would treat Mr. Chilel conservatively with stronger anti-inflammatory with Mobic 15 mg for short course of 2 weeks (given his history of chronic heart failure I would not go longer than this), Zanaflex for muscle relaxer, and a very short course of steroids for 5 days (he tells me he is on acid suppression) given the degree of inflammation in his shoulder.    I do not see that Mr. Chilel has any imaging with us on file. I would start with XR of cervical spine and shoulders bilaterally. It looks like he has a history of hypothyroidism in the chart.      I would see him back in 2 weeks.  If his symptoms have improved but have not abated, would consider referral to physical therapy based on what imaging shows us.  Given his restricted passive and active range of motion of the shoulder, he may have adhesive capsulitis and an x-ray would not diagnose this. He says that if he does need physical therapy he would prefer to be referred to physical therapy in [place].    We discussed red flag symptoms that would prompt him to present to ED for urgent evaluation prior to our next appointment.  In the absence of this, I would see him in 2 weeks.    -     XR Spine Cervical 2 or 3 View; Future  -     meloxicam (Mobic) 15 MG tablet; Take 1 tablet by mouth Daily.  Dispense: 14 tablet; Refill: 0  -     TiZANidine (Zanaflex) 2 MG capsule; Take 1 capsule by mouth 3 (Three) Times a Day As Needed for Muscle Spasms.  Dispense: 14 capsule; Refill: 0  -     predniSONE (DELTASONE) 5 MG tablet; Take 1 tablet by mouth Daily.  Dispense: 5 tablet; Refill: 0        -     XR Shoulder 2+ View Bilateral (In Office)  -     meloxicam (Mobic) 15 MG tablet; Take 1 tablet by mouth Daily.  Dispense: 14 tablet; Refill: 0  -     TiZANidine (Zanaflex) 2 MG capsule; Take 1 capsule by mouth 3 (Three) Times a Day As Needed for Muscle Spasms.  Dispense:  14 capsule; Refill: 0  -     predniSONE (DELTASONE) 5 MG tablet; Take 1 tablet by mouth Daily.  Dispense: 5 tablet; Refill: 0          Rx changes: Short 2-week course of Mobic, Zanaflex and 5 days worth of prednisone    Follow-up:     Return in about 2 weeks (around 1/5/2024) for Recheck.    Preventative:  Health Maintenance   Topic Date Due    TDAP/TD VACCINES (1 - Tdap) 05/11/2024 (Originally 6/6/1964)    ZOSTER VACCINE (2 of 3) 08/21/2024 (Originally 12/27/2009)    GASTROSCOPY (EGD)  04/27/2024    LIPID PANEL  05/15/2024    ANNUAL WELLNESS VISIT  08/21/2024    BMI FOLLOWUP  08/21/2024    HEPATITIS C SCREENING  Completed    COVID-19 Vaccine  Completed    INFLUENZA VACCINE  Completed    Pneumococcal Vaccine 65+  Completed    COLORECTAL CANCER SCREENING  Discontinued           This document has been electronically signed by Chris Low MD on December 22, 2023 09:04 EST       Parts of this note are electronic transcriptions/translations of spoken language to printed text using the Dragon Dictation system.

## 2023-12-22 NOTE — TELEPHONE ENCOUNTER
Caller: Asaf Chilel    Relationship: Self    Best call back number: [phone]    What is the best time to reach you: ANYTIME    Who are you requesting to speak with (clinical staff, provider,  specific staff member):   JHON   Do you know the name of the person who called: ASAF CHILEL    What was the call regarding: PATIENT WAS RETURNING THE CALL BACK ABOUT LABS    Is it okay if the provider responds through MyChart: NO

## 2024-01-01 ENCOUNTER — APPOINTMENT (OUTPATIENT)
Dept: GENERAL RADIOLOGY | Facility: HOSPITAL | Age: 79
DRG: 871 | End: 2024-01-01
Payer: MEDICARE

## 2024-01-01 ENCOUNTER — APPOINTMENT (OUTPATIENT)
Dept: CT IMAGING | Facility: HOSPITAL | Age: 79
DRG: 871 | End: 2024-01-01
Payer: MEDICARE

## 2024-01-01 ENCOUNTER — HOSPITAL ENCOUNTER (INPATIENT)
Facility: HOSPITAL | Age: 79
LOS: 1 days | DRG: 871 | End: 2024-05-22
Attending: EMERGENCY MEDICINE | Admitting: STUDENT IN AN ORGANIZED HEALTH CARE EDUCATION/TRAINING PROGRAM
Payer: MEDICARE

## 2024-01-01 ENCOUNTER — APPOINTMENT (OUTPATIENT)
Dept: CARDIOLOGY | Facility: HOSPITAL | Age: 79
DRG: 871 | End: 2024-01-01
Payer: MEDICARE

## 2024-01-01 VITALS
BODY MASS INDEX: 28.13 KG/M2 | HEART RATE: 32 BPM | SYSTOLIC BLOOD PRESSURE: 138 MMHG | OXYGEN SATURATION: 40 % | TEMPERATURE: 89.6 F | HEIGHT: 72 IN | WEIGHT: 207.67 LBS | RESPIRATION RATE: 35 BRPM | DIASTOLIC BLOOD PRESSURE: 26 MMHG

## 2024-01-01 DIAGNOSIS — N17.9 AKI (ACUTE KIDNEY INJURY): ICD-10-CM

## 2024-01-01 DIAGNOSIS — J18.9 PNEUMONIA DUE TO INFECTIOUS ORGANISM, UNSPECIFIED LATERALITY, UNSPECIFIED PART OF LUNG: ICD-10-CM

## 2024-01-01 DIAGNOSIS — A41.9 SEPSIS, DUE TO UNSPECIFIED ORGANISM, UNSPECIFIED WHETHER ACUTE ORGAN DYSFUNCTION PRESENT: ICD-10-CM

## 2024-01-01 DIAGNOSIS — J96.01 ACUTE HYPOXEMIC RESPIRATORY FAILURE: Primary | ICD-10-CM

## 2024-01-01 DIAGNOSIS — G93.41 METABOLIC ENCEPHALOPATHY: ICD-10-CM

## 2024-01-01 DIAGNOSIS — R79.89 ELEVATED TROPONIN: ICD-10-CM

## 2024-01-01 LAB
ACANTHOCYTES BLD QL SMEAR: NORMAL
ALBUMIN SERPL-MCNC: 3.4 G/DL (ref 3.5–5.2)
ALBUMIN SERPL-MCNC: 4.1 G/DL (ref 3.5–5.2)
ALBUMIN/GLOB SERPL: 1.1 G/DL
ALBUMIN/GLOB SERPL: 1.6 G/DL
ALP SERPL-CCNC: 49 U/L (ref 39–117)
ALP SERPL-CCNC: 71 U/L (ref 39–117)
ALT SERPL W P-5'-P-CCNC: 33 U/L (ref 1–41)
ALT SERPL W P-5'-P-CCNC: 36 U/L (ref 1–41)
ANION GAP SERPL CALCULATED.3IONS-SCNC: 14.2 MMOL/L (ref 5–15)
ANION GAP SERPL CALCULATED.3IONS-SCNC: 18.9 MMOL/L (ref 5–15)
AORTIC DIMENSIONLESS INDEX: 0.62 (DI)
ARTERIAL PATENCY WRIST A: ABNORMAL
ARTERIAL PATENCY WRIST A: POSITIVE
ARTERIAL PATENCY WRIST A: POSITIVE
AST SERPL-CCNC: 38 U/L (ref 1–40)
AST SERPL-CCNC: 54 U/L (ref 1–40)
B PARAPERT DNA SPEC QL NAA+PROBE: NOT DETECTED
B PERT DNA SPEC QL NAA+PROBE: NOT DETECTED
BACTERIA BLD CULT: ABNORMAL
BACTERIA UR QL AUTO: ABNORMAL /HPF
BASE EXCESS BLDA CALC-SCNC: -11.2 MMOL/L (ref -2–2)
BASE EXCESS BLDA CALC-SCNC: -13.6 MMOL/L (ref -2–2)
BASE EXCESS BLDA CALC-SCNC: -4.3 MMOL/L (ref -2–2)
BASOPHILS # BLD AUTO: 0.01 10*3/MM3 (ref 0–0.2)
BASOPHILS # BLD AUTO: 0.03 10*3/MM3 (ref 0–0.2)
BASOPHILS NFR BLD AUTO: 0 % (ref 0–1.5)
BASOPHILS NFR BLD AUTO: 0.4 % (ref 0–1.5)
BDY SITE: ABNORMAL
BH CV ECHO MEAS - ACS: 1.88 CM
BH CV ECHO MEAS - AO MAX PG: 5.8 MMHG
BH CV ECHO MEAS - AO MEAN PG: 2.6 MMHG
BH CV ECHO MEAS - AO ROOT DIAM: 3.1 CM
BH CV ECHO MEAS - AO V2 MAX: 120.1 CM/SEC
BH CV ECHO MEAS - AO V2 VTI: 14.8 CM
BH CV ECHO MEAS - AVA(I,D): 2.41 CM2
BH CV ECHO MEAS - EDV(CUBED): 115.7 ML
BH CV ECHO MEAS - ESV(CUBED): 61.7 ML
BH CV ECHO MEAS - FS: 18.9 %
BH CV ECHO MEAS - IVS/LVPW: 2.2 CM
BH CV ECHO MEAS - IVSD: 1.5 CM
BH CV ECHO MEAS - LA DIMENSION: 3.6 CM
BH CV ECHO MEAS - LAT PEAK E' VEL: 12.5 CM/SEC
BH CV ECHO MEAS - LV MASS(C)D: 269.1 GRAMS
BH CV ECHO MEAS - LV MAX PG: 2.8 MMHG
BH CV ECHO MEAS - LV MEAN PG: 0.88 MMHG
BH CV ECHO MEAS - LV V1 MAX: 83.1 CM/SEC
BH CV ECHO MEAS - LV V1 VTI: 9.2 CM
BH CV ECHO MEAS - LVIDD: 4.9 CM
BH CV ECHO MEAS - LVIDS: 4 CM
BH CV ECHO MEAS - LVOT AREA: 3.9 CM2
BH CV ECHO MEAS - LVOT DIAM: 2.21 CM
BH CV ECHO MEAS - LVPWD: 0.85 CM
BH CV ECHO MEAS - MED PEAK E' VEL: 12.2 CM/SEC
BH CV ECHO MEAS - MV DEC SLOPE: 722.2 CM/SEC2
BH CV ECHO MEAS - MV DEC TIME: 0.13 SEC
BH CV ECHO MEAS - MV E MAX VEL: 97.3 CM/SEC
BH CV ECHO MEAS - MV MAX PG: 6.5 MMHG
BH CV ECHO MEAS - MV MEAN PG: 2.7 MMHG
BH CV ECHO MEAS - MV V2 VTI: 14.8 CM
BH CV ECHO MEAS - MVA(VTI): 2.41 CM2
BH CV ECHO MEAS - RVDD: 2.7 CM
BH CV ECHO MEAS - RVOT DIAM: 2.3 CM
BH CV ECHO MEAS - SV(LVOT): 35.6 ML
BH CV ECHO MEAS - SVI(LVOT): 16.5 ML/M2
BH CV ECHO MEASUREMENTS AVERAGE E/E' RATIO: 7.88
BILIRUB SERPL-MCNC: 0.6 MG/DL (ref 0–1.2)
BILIRUB SERPL-MCNC: 1 MG/DL (ref 0–1.2)
BILIRUB UR QL STRIP: NEGATIVE
BOTTLE TYPE: ABNORMAL
BUN SERPL-MCNC: 27 MG/DL (ref 8–23)
BUN SERPL-MCNC: 34 MG/DL (ref 8–23)
BUN/CREAT SERPL: 15.5 (ref 7–25)
BUN/CREAT SERPL: 21.3 (ref 7–25)
C PNEUM DNA NPH QL NAA+NON-PROBE: NOT DETECTED
CA-I BLDA-SCNC: 1.21 MMOL/L (ref 1.13–1.32)
CA-I BLDA-SCNC: 1.71 MMOL/L (ref 1.13–1.32)
CALCIUM SPEC-SCNC: 9 MG/DL (ref 8.6–10.5)
CALCIUM SPEC-SCNC: 9.8 MG/DL (ref 8.6–10.5)
CHLORIDE BLDA-SCNC: 108 MMOL/L (ref 98–106)
CHLORIDE BLDA-SCNC: 109 MMOL/L (ref 98–106)
CHLORIDE SERPL-SCNC: 104 MMOL/L (ref 98–107)
CHLORIDE SERPL-SCNC: 108 MMOL/L (ref 98–107)
CLARITY UR: CLEAR
CO2 SERPL-SCNC: 15.1 MMOL/L (ref 22–29)
CO2 SERPL-SCNC: 22.8 MMOL/L (ref 22–29)
COHGB MFR BLD: 0.2 % (ref 0–1.5)
COHGB MFR BLD: 0.3 % (ref 0–1.5)
COHGB MFR BLD: 0.4 % (ref 0–1.5)
COLOR UR: YELLOW
CREAT SERPL-MCNC: 1.6 MG/DL (ref 0.76–1.27)
CREAT SERPL-MCNC: 1.74 MG/DL (ref 0.76–1.27)
D-LACTATE SERPL-SCNC: 4.3 MMOL/L (ref 0.5–2)
D-LACTATE SERPL-SCNC: 4.6 MMOL/L (ref 0.5–2)
D-LACTATE SERPL-SCNC: 4.8 MMOL/L (ref 0.5–2)
D-LACTATE SERPL-SCNC: 4.9 MMOL/L (ref 0.5–2)
D-LACTATE SERPL-SCNC: 5.1 MMOL/L (ref 0.5–2)
D-LACTATE SERPL-SCNC: 5.1 MMOL/L (ref 0.5–2)
DEPRECATED RDW RBC AUTO: 51.8 FL (ref 37–54)
DEPRECATED RDW RBC AUTO: 54.4 FL (ref 37–54)
EGFRCR SERPLBLD CKD-EPI 2021: 39.9 ML/MIN/1.73
EGFRCR SERPLBLD CKD-EPI 2021: 44.1 ML/MIN/1.73
EOSINOPHIL # BLD AUTO: 0 10*3/MM3 (ref 0–0.4)
EOSINOPHIL # BLD AUTO: 0.06 10*3/MM3 (ref 0–0.4)
EOSINOPHIL NFR BLD AUTO: 0 % (ref 0.3–6.2)
EOSINOPHIL NFR BLD AUTO: 0.7 % (ref 0.3–6.2)
ERYTHROCYTE [DISTWIDTH] IN BLOOD BY AUTOMATED COUNT: 15.6 % (ref 12.3–15.4)
ERYTHROCYTE [DISTWIDTH] IN BLOOD BY AUTOMATED COUNT: 15.7 % (ref 12.3–15.4)
FHHB: 10.9 % (ref 0–5)
FHHB: 2.6 % (ref 0–5)
FHHB: 8.3 % (ref 0–5)
FLUAV SUBTYP SPEC NAA+PROBE: NOT DETECTED
FLUBV RNA ISLT QL NAA+PROBE: NOT DETECTED
GAS FLOW AIRWAY: 15 LPM
GLOBULIN UR ELPH-MCNC: 2.6 GM/DL
GLOBULIN UR ELPH-MCNC: 3 GM/DL
GLUCOSE BLDA-MCNC: 130 MG/DL (ref 70–99)
GLUCOSE BLDA-MCNC: 158 MG/DL (ref 70–99)
GLUCOSE BLDC GLUCOMTR-MCNC: 120 MG/DL (ref 70–99)
GLUCOSE SERPL-MCNC: 102 MG/DL (ref 65–99)
GLUCOSE SERPL-MCNC: 116 MG/DL (ref 65–99)
GLUCOSE UR STRIP-MCNC: NEGATIVE MG/DL
HADV DNA SPEC NAA+PROBE: NOT DETECTED
HCO3 BLDA-SCNC: 13.3 MMOL/L (ref 22–26)
HCO3 BLDA-SCNC: 17.7 MMOL/L (ref 22–26)
HCO3 BLDA-SCNC: 18.2 MMOL/L (ref 22–26)
HCOV 229E RNA SPEC QL NAA+PROBE: NOT DETECTED
HCOV HKU1 RNA SPEC QL NAA+PROBE: NOT DETECTED
HCOV NL63 RNA SPEC QL NAA+PROBE: NOT DETECTED
HCOV OC43 RNA SPEC QL NAA+PROBE: NOT DETECTED
HCT VFR BLD AUTO: 44.5 % (ref 37.5–51)
HCT VFR BLD AUTO: 52.1 % (ref 37.5–51)
HGB BLD-MCNC: 14 G/DL (ref 13–17.7)
HGB BLD-MCNC: 16.9 G/DL (ref 13–17.7)
HGB BLDA-MCNC: 11.9 G/DL (ref 13.8–16.4)
HGB BLDA-MCNC: 14.5 G/DL (ref 13.8–16.4)
HGB BLDA-MCNC: 17.2 G/DL (ref 13.8–16.4)
HGB UR QL STRIP.AUTO: ABNORMAL
HMPV RNA NPH QL NAA+NON-PROBE: NOT DETECTED
HOLD SPECIMEN: NORMAL
HOLD SPECIMEN: NORMAL
HPIV1 RNA ISLT QL NAA+PROBE: NOT DETECTED
HPIV2 RNA SPEC QL NAA+PROBE: NOT DETECTED
HPIV3 RNA NPH QL NAA+PROBE: DETECTED
HPIV4 P GENE NPH QL NAA+PROBE: NOT DETECTED
HYALINE CASTS UR QL AUTO: ABNORMAL /LPF
IMM GRANULOCYTES # BLD AUTO: 0.04 10*3/MM3 (ref 0–0.05)
IMM GRANULOCYTES # BLD AUTO: 1.94 10*3/MM3 (ref 0–0.05)
IMM GRANULOCYTES NFR BLD AUTO: 0.5 % (ref 0–0.5)
IMM GRANULOCYTES NFR BLD AUTO: 6.6 % (ref 0–0.5)
INHALED O2 CONCENTRATION: 100 %
IVRT: 55 MS
KETONES UR QL STRIP: ABNORMAL
L PNEUMO1 AG UR QL IA: NEGATIVE
LACTATE BLDA-SCNC: 11.39 MMOL/L (ref 0.5–2)
LACTATE BLDA-SCNC: 4.06 MMOL/L (ref 0.5–2)
LACTATE BLDA-SCNC: ABNORMAL MMOL/L
LARGE PLATELETS: NORMAL
LEFT ATRIUM VOLUME INDEX: 16.2 ML/M2
LEUKOCYTE ESTERASE UR QL STRIP.AUTO: NEGATIVE
LYMPHOCYTES # BLD AUTO: 1.34 10*3/MM3 (ref 0.7–3.1)
LYMPHOCYTES # BLD AUTO: 1.75 10*3/MM3 (ref 0.7–3.1)
LYMPHOCYTES NFR BLD AUTO: 21.3 % (ref 19.6–45.3)
LYMPHOCYTES NFR BLD AUTO: 4.5 % (ref 19.6–45.3)
M PNEUMO IGG SER IA-ACNC: NOT DETECTED
MAGNESIUM SERPL-MCNC: 1.2 MG/DL (ref 1.6–2.4)
MAGNESIUM SERPL-MCNC: 2.6 MG/DL (ref 1.6–2.4)
MCH RBC QN AUTO: 29.5 PG (ref 26.6–33)
MCH RBC QN AUTO: 29.6 PG (ref 26.6–33)
MCHC RBC AUTO-ENTMCNC: 31.5 G/DL (ref 31.5–35.7)
MCHC RBC AUTO-ENTMCNC: 32.4 G/DL (ref 31.5–35.7)
MCV RBC AUTO: 91.4 FL (ref 79–97)
MCV RBC AUTO: 93.9 FL (ref 79–97)
METHGB BLD QL: 0.1 % (ref 0–1.5)
METHGB BLD QL: 0.2 % (ref 0–1.5)
METHGB BLD QL: 0.2 % (ref 0–1.5)
MODALITY: ABNORMAL
MONOCYTES # BLD AUTO: 0.29 10*3/MM3 (ref 0.1–0.9)
MONOCYTES # BLD AUTO: 0.76 10*3/MM3 (ref 0.1–0.9)
MONOCYTES NFR BLD AUTO: 2.6 % (ref 5–12)
MONOCYTES NFR BLD AUTO: 3.5 % (ref 5–12)
MRSA DNA SPEC QL NAA+PROBE: NORMAL
NEUTROPHILS NFR BLD AUTO: 25.56 10*3/MM3 (ref 1.7–7)
NEUTROPHILS NFR BLD AUTO: 6.03 10*3/MM3 (ref 1.7–7)
NEUTROPHILS NFR BLD AUTO: 73.6 % (ref 42.7–76)
NEUTROPHILS NFR BLD AUTO: 86.3 % (ref 42.7–76)
NITRITE UR QL STRIP: NEGATIVE
NOTE: ABNORMAL
NRBC BLD AUTO-RTO: 0 /100 WBC (ref 0–0.2)
NRBC BLD AUTO-RTO: 0 /100 WBC (ref 0–0.2)
NT-PROBNP SERPL-MCNC: 428.2 PG/ML (ref 0–1800)
OVALOCYTES BLD QL SMEAR: NORMAL
OXYHGB MFR BLDV: 88.6 % (ref 94–99)
OXYHGB MFR BLDV: 91.3 % (ref 94–99)
OXYHGB MFR BLDV: 96.9 % (ref 94–99)
PCO2 BLDA: 27.1 MM HG (ref 35–45)
PCO2 BLDA: 28.2 MM HG (ref 35–45)
PCO2 BLDA: 69.3 MM HG (ref 35–45)
PH BLDA: 7.03 PH UNITS (ref 7.35–7.45)
PH BLDA: 7.31 PH UNITS (ref 7.35–7.45)
PH BLDA: 7.43 PH UNITS (ref 7.35–7.45)
PH UR STRIP.AUTO: <=5 [PH] (ref 5–8)
PHOSPHATE SERPL-MCNC: 5.8 MG/DL (ref 2.5–4.5)
PLAT MORPH BLD: NORMAL
PLATELET # BLD AUTO: 173 10*3/MM3 (ref 140–450)
PLATELET # BLD AUTO: 228 10*3/MM3 (ref 140–450)
PMV BLD AUTO: 10.4 FL (ref 6–12)
PMV BLD AUTO: 10.5 FL (ref 6–12)
PO2 BLD: 116 MM[HG] (ref 0–500)
PO2 BLD: 56 MM[HG] (ref 0–500)
PO2 BLD: 96 MM[HG] (ref 0–500)
PO2 BLDA: 115.5 MM HG (ref 80–100)
PO2 BLDA: 55.9 MM HG (ref 80–100)
PO2 BLDA: 95.8 MM HG (ref 80–100)
POTASSIUM BLDA-SCNC: 3.99 MMOL/L (ref 3.5–5)
POTASSIUM BLDA-SCNC: 4.45 MMOL/L (ref 3.5–5)
POTASSIUM SERPL-SCNC: 4 MMOL/L (ref 3.5–5.2)
POTASSIUM SERPL-SCNC: 4.7 MMOL/L (ref 3.5–5.2)
PROCALCITONIN SERPL-MCNC: 17.96 NG/ML (ref 0–0.25)
PROT SERPL-MCNC: 6.4 G/DL (ref 6–8.5)
PROT SERPL-MCNC: 6.7 G/DL (ref 6–8.5)
PROT UR QL STRIP: ABNORMAL
QT INTERVAL: 359 MS
QT INTERVAL: 363 MS
QT INTERVAL: 391 MS
QTC INTERVAL: 446 MS
QTC INTERVAL: 514 MS
QTC INTERVAL: 558 MS
RBC # BLD AUTO: 4.74 10*6/MM3 (ref 4.14–5.8)
RBC # BLD AUTO: 5.7 10*6/MM3 (ref 4.14–5.8)
RBC # UR STRIP: ABNORMAL /HPF
RBC MORPH BLD: NORMAL
REF LAB TEST METHOD: ABNORMAL
RHINOVIRUS RNA SPEC NAA+PROBE: NOT DETECTED
RSV RNA NPH QL NAA+NON-PROBE: NOT DETECTED
S PNEUM AG SPEC QL LA: POSITIVE
SAO2 % BLDCOA: 89 % (ref 95–99)
SAO2 % BLDCOA: 91.7 % (ref 95–99)
SAO2 % BLDCOA: 97.4 % (ref 95–99)
SARS-COV-2 RNA RESP QL NAA+PROBE: NOT DETECTED
SODIUM BLDA-SCNC: 142.1 MMOL/L (ref 136–146)
SODIUM BLDA-SCNC: 148.2 MMOL/L (ref 136–146)
SODIUM SERPL-SCNC: 141 MMOL/L (ref 136–145)
SODIUM SERPL-SCNC: 142 MMOL/L (ref 136–145)
SP GR UR STRIP: >1.03 (ref 1–1.03)
SPERM URNS QL MICRO: ABNORMAL /HPF
SQUAMOUS #/AREA URNS HPF: ABNORMAL /HPF
TROPONIN T SERPL HS-MCNC: 47 NG/L
UROBILINOGEN UR QL STRIP: ABNORMAL
WBC # UR STRIP: ABNORMAL /HPF
WBC MORPH BLD: NORMAL
WBC MORPH BLD: NORMAL
WBC NRBC COR # BLD AUTO: 29.61 10*3/MM3 (ref 3.4–10.8)
WBC NRBC COR # BLD AUTO: 8.2 10*3/MM3 (ref 3.4–10.8)
WHOLE BLOOD HOLD COAG: NORMAL
WHOLE BLOOD HOLD SPECIMEN: NORMAL

## 2024-01-01 PROCEDURE — 84484 ASSAY OF TROPONIN QUANT: CPT | Performed by: EMERGENCY MEDICINE

## 2024-01-01 PROCEDURE — 94660 CPAP INITIATION&MGMT: CPT

## 2024-01-01 PROCEDURE — 25010000002 AZITHROMYCIN PER 500 MG: Performed by: INTERNAL MEDICINE

## 2024-01-01 PROCEDURE — 51702 INSERT TEMP BLADDER CATH: CPT | Performed by: PHYSICIAN ASSISTANT

## 2024-01-01 PROCEDURE — 25010000002 HEPARIN (PORCINE) PER 1000 UNITS: Performed by: STUDENT IN AN ORGANIZED HEALTH CARE EDUCATION/TRAINING PROGRAM

## 2024-01-01 PROCEDURE — 92950 HEART/LUNG RESUSCITATION CPR: CPT | Performed by: INTERNAL MEDICINE

## 2024-01-01 PROCEDURE — 25010000002 EPINEPHRINE 1 MG/10ML SOLUTION PREFILLED SYRINGE: Performed by: INTERNAL MEDICINE

## 2024-01-01 PROCEDURE — 25010000002 AMIODARONE IN DEXTROSE 5% 360-4.14 MG/200ML-% SOLUTION: Performed by: STUDENT IN AN ORGANIZED HEALTH CARE EDUCATION/TRAINING PROGRAM

## 2024-01-01 PROCEDURE — 25010000002 METHYLPREDNISOLONE PER 40 MG: Performed by: INTERNAL MEDICINE

## 2024-01-01 PROCEDURE — 94799 UNLISTED PULMONARY SVC/PX: CPT

## 2024-01-01 PROCEDURE — 93010 ELECTROCARDIOGRAM REPORT: CPT | Performed by: INTERNAL MEDICINE

## 2024-01-01 PROCEDURE — 25010000002 FUROSEMIDE PER 20 MG: Performed by: INTERNAL MEDICINE

## 2024-01-01 PROCEDURE — 25810000003 LACTATED RINGERS SOLUTION: Performed by: INTERNAL MEDICINE

## 2024-01-01 PROCEDURE — 25010000002 CEFEPIME PER 500 MG: Performed by: STUDENT IN AN ORGANIZED HEALTH CARE EDUCATION/TRAINING PROGRAM

## 2024-01-01 PROCEDURE — 99291 CRITICAL CARE FIRST HOUR: CPT | Performed by: INTERNAL MEDICINE

## 2024-01-01 PROCEDURE — 82805 BLOOD GASES W/O2 SATURATION: CPT | Performed by: STUDENT IN AN ORGANIZED HEALTH CARE EDUCATION/TRAINING PROGRAM

## 2024-01-01 PROCEDURE — 93306 TTE W/DOPPLER COMPLETE: CPT

## 2024-01-01 PROCEDURE — 0202U NFCT DS 22 TRGT SARS-COV-2: CPT | Performed by: STUDENT IN AN ORGANIZED HEALTH CARE EDUCATION/TRAINING PROGRAM

## 2024-01-01 PROCEDURE — 87154 CUL TYP ID BLD PTHGN 6+ TRGT: CPT | Performed by: EMERGENCY MEDICINE

## 2024-01-01 PROCEDURE — 80053 COMPREHEN METABOLIC PANEL: CPT | Performed by: EMERGENCY MEDICINE

## 2024-01-01 PROCEDURE — 99291 CRITICAL CARE FIRST HOUR: CPT

## 2024-01-01 PROCEDURE — 94667 MNPJ CHEST WALL 1ST: CPT

## 2024-01-01 PROCEDURE — 99222 1ST HOSP IP/OBS MODERATE 55: CPT | Performed by: INTERNAL MEDICINE

## 2024-01-01 PROCEDURE — 36620 INSERTION CATHETER ARTERY: CPT | Performed by: INTERNAL MEDICINE

## 2024-01-01 PROCEDURE — 83050 HGB METHEMOGLOBIN QUAN: CPT | Performed by: EMERGENCY MEDICINE

## 2024-01-01 PROCEDURE — 99285 EMERGENCY DEPT VISIT HI MDM: CPT

## 2024-01-01 PROCEDURE — 5A12012 PERFORMANCE OF CARDIAC OUTPUT, SINGLE, MANUAL: ICD-10-PCS | Performed by: STUDENT IN AN ORGANIZED HEALTH CARE EDUCATION/TRAINING PROGRAM

## 2024-01-01 PROCEDURE — 25810000003 SODIUM CHLORIDE 0.9 % SOLUTION: Performed by: INTERNAL MEDICINE

## 2024-01-01 PROCEDURE — 25010000002 PROCHLORPERAZINE 10 MG/2ML SOLUTION: Performed by: STUDENT IN AN ORGANIZED HEALTH CARE EDUCATION/TRAINING PROGRAM

## 2024-01-01 PROCEDURE — 87147 CULTURE TYPE IMMUNOLOGIC: CPT | Performed by: EMERGENCY MEDICINE

## 2024-01-01 PROCEDURE — 83880 ASSAY OF NATRIURETIC PEPTIDE: CPT | Performed by: EMERGENCY MEDICINE

## 2024-01-01 PROCEDURE — 81001 URINALYSIS AUTO W/SCOPE: CPT | Performed by: EMERGENCY MEDICINE

## 2024-01-01 PROCEDURE — 85007 BL SMEAR W/DIFF WBC COUNT: CPT | Performed by: INTERNAL MEDICINE

## 2024-01-01 PROCEDURE — 25810000003 SODIUM CHLORIDE 0.9 % SOLUTION: Performed by: EMERGENCY MEDICINE

## 2024-01-01 PROCEDURE — 82805 BLOOD GASES W/O2 SATURATION: CPT | Performed by: EMERGENCY MEDICINE

## 2024-01-01 PROCEDURE — 82375 ASSAY CARBOXYHB QUANT: CPT | Performed by: INTERNAL MEDICINE

## 2024-01-01 PROCEDURE — 83735 ASSAY OF MAGNESIUM: CPT | Performed by: INTERNAL MEDICINE

## 2024-01-01 PROCEDURE — 83050 HGB METHEMOGLOBIN QUAN: CPT | Performed by: STUDENT IN AN ORGANIZED HEALTH CARE EDUCATION/TRAINING PROGRAM

## 2024-01-01 PROCEDURE — 0BH17EZ INSERTION OF ENDOTRACHEAL AIRWAY INTO TRACHEA, VIA NATURAL OR ARTIFICIAL OPENING: ICD-10-PCS | Performed by: STUDENT IN AN ORGANIZED HEALTH CARE EDUCATION/TRAINING PROGRAM

## 2024-01-01 PROCEDURE — 25010000002 CEFTRIAXONE PER 250 MG: Performed by: INTERNAL MEDICINE

## 2024-01-01 PROCEDURE — 71045 X-RAY EXAM CHEST 1 VIEW: CPT

## 2024-01-01 PROCEDURE — 25810000003 LACTATED RINGERS PER 1000 ML: Performed by: INTERNAL MEDICINE

## 2024-01-01 PROCEDURE — 93306 TTE W/DOPPLER COMPLETE: CPT | Performed by: INTERNAL MEDICINE

## 2024-01-01 PROCEDURE — 25010000002 EPINEPHRINE 1 MG/ML SOLUTION: Performed by: INTERNAL MEDICINE

## 2024-01-01 PROCEDURE — 25010000002 CEFEPIME PER 500 MG: Performed by: EMERGENCY MEDICINE

## 2024-01-01 PROCEDURE — 99232 SBSQ HOSP IP/OBS MODERATE 35: CPT | Performed by: INTERNAL MEDICINE

## 2024-01-01 PROCEDURE — 25010000002 VANCOMYCIN 5 G RECONSTITUTED SOLUTION: Performed by: EMERGENCY MEDICINE

## 2024-01-01 PROCEDURE — 85025 COMPLETE CBC W/AUTO DIFF WBC: CPT | Performed by: INTERNAL MEDICINE

## 2024-01-01 PROCEDURE — 87449 NOS EACH ORGANISM AG IA: CPT | Performed by: STUDENT IN AN ORGANIZED HEALTH CARE EDUCATION/TRAINING PROGRAM

## 2024-01-01 PROCEDURE — 84145 PROCALCITONIN (PCT): CPT | Performed by: INTERNAL MEDICINE

## 2024-01-01 PROCEDURE — 0 DEXTROSE 5 % SOLUTION: Performed by: INTERNAL MEDICINE

## 2024-01-01 PROCEDURE — 25010000002 AMIODARONE IN DEXTROSE 5% 150-4.21 MG/100ML-% SOLUTION: Performed by: EMERGENCY MEDICINE

## 2024-01-01 PROCEDURE — 25010000002 MAGNESIUM SULFATE 2 GM/50ML SOLUTION: Performed by: INTERNAL MEDICINE

## 2024-01-01 PROCEDURE — 36415 COLL VENOUS BLD VENIPUNCTURE: CPT | Performed by: EMERGENCY MEDICINE

## 2024-01-01 PROCEDURE — 25010000002 VANCOMYCIN 5 G RECONSTITUTED SOLUTION: Performed by: STUDENT IN AN ORGANIZED HEALTH CARE EDUCATION/TRAINING PROGRAM

## 2024-01-01 PROCEDURE — 36600 WITHDRAWAL OF ARTERIAL BLOOD: CPT | Performed by: STUDENT IN AN ORGANIZED HEALTH CARE EDUCATION/TRAINING PROGRAM

## 2024-01-01 PROCEDURE — 92950 HEART/LUNG RESUSCITATION CPR: CPT

## 2024-01-01 PROCEDURE — 83050 HGB METHEMOGLOBIN QUAN: CPT | Performed by: INTERNAL MEDICINE

## 2024-01-01 PROCEDURE — 83735 ASSAY OF MAGNESIUM: CPT | Performed by: STUDENT IN AN ORGANIZED HEALTH CARE EDUCATION/TRAINING PROGRAM

## 2024-01-01 PROCEDURE — 25510000001 IOPAMIDOL PER 1 ML: Performed by: EMERGENCY MEDICINE

## 2024-01-01 PROCEDURE — 82805 BLOOD GASES W/O2 SATURATION: CPT | Performed by: INTERNAL MEDICINE

## 2024-01-01 PROCEDURE — 5A09357 ASSISTANCE WITH RESPIRATORY VENTILATION, LESS THAN 24 CONSECUTIVE HOURS, CONTINUOUS POSITIVE AIRWAY PRESSURE: ICD-10-PCS | Performed by: STUDENT IN AN ORGANIZED HEALTH CARE EDUCATION/TRAINING PROGRAM

## 2024-01-01 PROCEDURE — 25010000002 SULFUR HEXAFLUORIDE MICROSPH 60.7-25 MG RECONSTITUTED SUSPENSION: Performed by: EMERGENCY MEDICINE

## 2024-01-01 PROCEDURE — 82375 ASSAY CARBOXYHB QUANT: CPT | Performed by: STUDENT IN AN ORGANIZED HEALTH CARE EDUCATION/TRAINING PROGRAM

## 2024-01-01 PROCEDURE — 93005 ELECTROCARDIOGRAM TRACING: CPT | Performed by: EMERGENCY MEDICINE

## 2024-01-01 PROCEDURE — 99239 HOSP IP/OBS DSCHRG MGMT >30: CPT | Performed by: STUDENT IN AN ORGANIZED HEALTH CARE EDUCATION/TRAINING PROGRAM

## 2024-01-01 PROCEDURE — 94761 N-INVAS EAR/PLS OXIMETRY MLT: CPT

## 2024-01-01 PROCEDURE — 87641 MR-STAPH DNA AMP PROBE: CPT | Performed by: STUDENT IN AN ORGANIZED HEALTH CARE EDUCATION/TRAINING PROGRAM

## 2024-01-01 PROCEDURE — 36600 WITHDRAWAL OF ARTERIAL BLOOD: CPT | Performed by: EMERGENCY MEDICINE

## 2024-01-01 PROCEDURE — 71260 CT THORAX DX C+: CPT

## 2024-01-01 PROCEDURE — 83605 ASSAY OF LACTIC ACID: CPT | Performed by: EMERGENCY MEDICINE

## 2024-01-01 PROCEDURE — 99292 CRITICAL CARE ADDL 30 MIN: CPT | Performed by: INTERNAL MEDICINE

## 2024-01-01 PROCEDURE — 25010000002 ATROPINE SULFATE: Performed by: INTERNAL MEDICINE

## 2024-01-01 PROCEDURE — 84100 ASSAY OF PHOSPHORUS: CPT | Performed by: INTERNAL MEDICINE

## 2024-01-01 PROCEDURE — 94760 N-INVAS EAR/PLS OXIMETRY 1: CPT

## 2024-01-01 PROCEDURE — 99222 1ST HOSP IP/OBS MODERATE 55: CPT | Performed by: STUDENT IN AN ORGANIZED HEALTH CARE EDUCATION/TRAINING PROGRAM

## 2024-01-01 PROCEDURE — 85025 COMPLETE CBC W/AUTO DIFF WBC: CPT | Performed by: EMERGENCY MEDICINE

## 2024-01-01 PROCEDURE — 82948 REAGENT STRIP/BLOOD GLUCOSE: CPT

## 2024-01-01 PROCEDURE — 36556 INSERT NON-TUNNEL CV CATH: CPT | Performed by: INTERNAL MEDICINE

## 2024-01-01 PROCEDURE — 25010000002 MAGNESIUM SULFATE 4 GM/100ML SOLUTION: Performed by: INTERNAL MEDICINE

## 2024-01-01 PROCEDURE — 93005 ELECTROCARDIOGRAM TRACING: CPT | Performed by: PHYSICIAN ASSISTANT

## 2024-01-01 PROCEDURE — 80053 COMPREHEN METABOLIC PANEL: CPT | Performed by: INTERNAL MEDICINE

## 2024-01-01 PROCEDURE — 87040 BLOOD CULTURE FOR BACTERIA: CPT | Performed by: EMERGENCY MEDICINE

## 2024-01-01 PROCEDURE — 25810000003 SODIUM CHLORIDE 0.9 % SOLUTION: Performed by: STUDENT IN AN ORGANIZED HEALTH CARE EDUCATION/TRAINING PROGRAM

## 2024-01-01 PROCEDURE — 85007 BL SMEAR W/DIFF WBC COUNT: CPT | Performed by: EMERGENCY MEDICINE

## 2024-01-01 PROCEDURE — 94640 AIRWAY INHALATION TREATMENT: CPT

## 2024-01-01 PROCEDURE — 82375 ASSAY CARBOXYHB QUANT: CPT | Performed by: EMERGENCY MEDICINE

## 2024-01-01 PROCEDURE — 5A1935Z RESPIRATORY VENTILATION, LESS THAN 24 CONSECUTIVE HOURS: ICD-10-PCS | Performed by: STUDENT IN AN ORGANIZED HEALTH CARE EDUCATION/TRAINING PROGRAM

## 2024-01-01 PROCEDURE — 25010000002 PROPOFOL 10 MG/ML EMULSION

## 2024-01-01 PROCEDURE — 70450 CT HEAD/BRAIN W/O DYE: CPT

## 2024-01-01 RX ORDER — IPRATROPIUM BROMIDE AND ALBUTEROL SULFATE 2.5; .5 MG/3ML; MG/3ML
3 SOLUTION RESPIRATORY (INHALATION)
Status: DISCONTINUED | OUTPATIENT
Start: 2024-01-01 | End: 2024-01-01

## 2024-01-01 RX ORDER — NOREPINEPHRINE BITARTRATE 0.03 MG/ML
.02-3 INJECTION, SOLUTION INTRAVENOUS
Status: DISCONTINUED | OUTPATIENT
Start: 2024-01-01 | End: 2024-01-01 | Stop reason: HOSPADM

## 2024-01-01 RX ORDER — SODIUM CHLORIDE 0.9 % (FLUSH) 0.9 %
10 SYRINGE (ML) INJECTION AS NEEDED
Status: DISCONTINUED | OUTPATIENT
Start: 2024-01-01 | End: 2024-01-01 | Stop reason: HOSPADM

## 2024-01-01 RX ORDER — METHYLPREDNISOLONE SODIUM SUCCINATE 40 MG/ML
40 INJECTION, POWDER, LYOPHILIZED, FOR SOLUTION INTRAMUSCULAR; INTRAVENOUS EVERY 8 HOURS
Status: DISCONTINUED | OUTPATIENT
Start: 2024-01-01 | End: 2024-01-01 | Stop reason: HOSPADM

## 2024-01-01 RX ORDER — MAGNESIUM SULFATE HEPTAHYDRATE 40 MG/ML
2 INJECTION, SOLUTION INTRAVENOUS
Qty: 150 ML | Refills: 0 | Status: DISPENSED | OUTPATIENT
Start: 2024-01-01 | End: 2024-01-01

## 2024-01-01 RX ORDER — ASPIRIN 81 MG/1
81 TABLET, CHEWABLE ORAL DAILY
Status: DISCONTINUED | OUTPATIENT
Start: 2024-01-01 | End: 2024-01-01 | Stop reason: HOSPADM

## 2024-01-01 RX ORDER — MAGNESIUM SULFATE HEPTAHYDRATE 40 MG/ML
4 INJECTION, SOLUTION INTRAVENOUS ONCE
Status: COMPLETED | OUTPATIENT
Start: 2024-01-01 | End: 2024-01-01

## 2024-01-01 RX ORDER — CLINDAMYCIN PHOSPHATE 10 MG/ML
1 SOLUTION TOPICAL 2 TIMES DAILY
COMMUNITY
Start: 2024-01-01

## 2024-01-01 RX ORDER — SODIUM CHLORIDE 9 MG/ML
40 INJECTION, SOLUTION INTRAVENOUS AS NEEDED
Status: DISCONTINUED | OUTPATIENT
Start: 2024-01-01 | End: 2024-01-01 | Stop reason: HOSPADM

## 2024-01-01 RX ORDER — FUROSEMIDE 10 MG/ML
80 INJECTION INTRAMUSCULAR; INTRAVENOUS ONCE
Status: COMPLETED | OUTPATIENT
Start: 2024-01-01 | End: 2024-01-01

## 2024-01-01 RX ORDER — ROCURONIUM BROMIDE 10 MG/ML
0.5 INJECTION, SOLUTION INTRAVENOUS ONCE
Status: COMPLETED | OUTPATIENT
Start: 2024-01-01 | End: 2024-01-01

## 2024-01-01 RX ORDER — ARFORMOTEROL TARTRATE 15 UG/2ML
15 SOLUTION RESPIRATORY (INHALATION)
Status: DISCONTINUED | OUTPATIENT
Start: 2024-01-01 | End: 2024-01-01

## 2024-01-01 RX ORDER — AMOXICILLIN 250 MG
2 CAPSULE ORAL 2 TIMES DAILY PRN
Status: DISCONTINUED | OUTPATIENT
Start: 2024-01-01 | End: 2024-01-01 | Stop reason: HOSPADM

## 2024-01-01 RX ORDER — LEVOTHYROXINE SODIUM 88 UG/1
88 TABLET ORAL DAILY
COMMUNITY

## 2024-01-01 RX ORDER — BISACODYL 5 MG/1
5 TABLET, DELAYED RELEASE ORAL DAILY PRN
Status: DISCONTINUED | OUTPATIENT
Start: 2024-01-01 | End: 2024-01-01 | Stop reason: HOSPADM

## 2024-01-01 RX ORDER — SODIUM CHLORIDE 0.9 % (FLUSH) 0.9 %
10 SYRINGE (ML) INJECTION EVERY 12 HOURS SCHEDULED
Status: DISCONTINUED | OUTPATIENT
Start: 2024-01-01 | End: 2024-01-01 | Stop reason: HOSPADM

## 2024-01-01 RX ORDER — SODIUM CHLORIDE, SODIUM LACTATE, POTASSIUM CHLORIDE, CALCIUM CHLORIDE 600; 310; 30; 20 MG/100ML; MG/100ML; MG/100ML; MG/100ML
120 INJECTION, SOLUTION INTRAVENOUS CONTINUOUS
Status: DISCONTINUED | OUTPATIENT
Start: 2024-01-01 | End: 2024-01-01

## 2024-01-01 RX ORDER — POLYETHYLENE GLYCOL 3350 17 G/17G
17 POWDER, FOR SOLUTION ORAL DAILY PRN
Status: DISCONTINUED | OUTPATIENT
Start: 2024-01-01 | End: 2024-01-01 | Stop reason: HOSPADM

## 2024-01-01 RX ORDER — VANCOMYCIN 2 GRAM/500 ML IN 0.9 % SODIUM CHLORIDE INTRAVENOUS
20 ONCE
Status: COMPLETED | OUTPATIENT
Start: 2024-01-01 | End: 2024-01-01

## 2024-01-01 RX ORDER — CALCIUM CHLORIDE 100 MG/ML
INJECTION INTRAVENOUS; INTRAVENTRICULAR
Status: COMPLETED | OUTPATIENT
Start: 2024-01-01 | End: 2024-01-01

## 2024-01-01 RX ORDER — LEVOTHYROXINE SODIUM 88 UG/1
0.05 TABLET ORAL
COMMUNITY

## 2024-01-01 RX ORDER — HEPARIN SODIUM 5000 [USP'U]/ML
5000 INJECTION, SOLUTION INTRAVENOUS; SUBCUTANEOUS EVERY 8 HOURS SCHEDULED
Status: DISCONTINUED | OUTPATIENT
Start: 2024-01-01 | End: 2024-01-01 | Stop reason: HOSPADM

## 2024-01-01 RX ORDER — DILTIAZEM HYDROCHLORIDE 5 MG/ML
10 INJECTION INTRAVENOUS ONCE
Status: COMPLETED | OUTPATIENT
Start: 2024-01-01 | End: 2024-01-01

## 2024-01-01 RX ORDER — PROCHLORPERAZINE EDISYLATE 5 MG/ML
5 INJECTION INTRAMUSCULAR; INTRAVENOUS EVERY 6 HOURS PRN
Status: DISCONTINUED | OUTPATIENT
Start: 2024-01-01 | End: 2024-01-01 | Stop reason: HOSPADM

## 2024-01-01 RX ORDER — FAMOTIDINE 10 MG/ML
20 INJECTION, SOLUTION INTRAVENOUS EVERY 12 HOURS SCHEDULED
Status: DISCONTINUED | OUTPATIENT
Start: 2024-01-01 | End: 2024-01-01 | Stop reason: HOSPADM

## 2024-01-01 RX ORDER — BISACODYL 10 MG
10 SUPPOSITORY, RECTAL RECTAL DAILY PRN
Status: DISCONTINUED | OUTPATIENT
Start: 2024-01-01 | End: 2024-01-01 | Stop reason: HOSPADM

## 2024-01-01 RX ORDER — DILTIAZEM HYDROCHLORIDE 5 MG/ML
INJECTION INTRAVENOUS
Status: COMPLETED
Start: 2024-01-01 | End: 2024-01-01

## 2024-01-01 RX ORDER — PROPOFOL 10 MG/ML
VIAL (ML) INTRAVENOUS
Status: COMPLETED
Start: 2024-01-01 | End: 2024-01-01

## 2024-01-01 RX ORDER — SODIUM CHLORIDE 9 MG/ML
125 INJECTION, SOLUTION INTRAVENOUS CONTINUOUS
Status: DISCONTINUED | OUTPATIENT
Start: 2024-01-01 | End: 2024-01-01

## 2024-01-01 RX ORDER — BUDESONIDE 0.5 MG/2ML
0.5 INHALANT ORAL
Status: DISCONTINUED | OUTPATIENT
Start: 2024-01-01 | End: 2024-01-01

## 2024-01-01 RX ORDER — NOREPINEPHRINE BITARTRATE 0.03 MG/ML
INJECTION, SOLUTION INTRAVENOUS
Status: COMPLETED
Start: 2024-01-01 | End: 2024-01-01

## 2024-01-01 RX ORDER — ICOSAPENT ETHYL 1 G/1
2 CAPSULE ORAL 2 TIMES DAILY WITH MEALS
COMMUNITY

## 2024-01-01 RX ORDER — LIDOCAINE HYDROCHLORIDE 20 MG/ML
JELLY TOPICAL ONCE
Status: COMPLETED | OUTPATIENT
Start: 2024-01-01 | End: 2024-01-01

## 2024-01-01 RX ADMIN — METHYLPREDNISOLONE SODIUM SUCCINATE 40 MG: 40 INJECTION INTRAMUSCULAR; INTRAVENOUS at 14:44

## 2024-01-01 RX ADMIN — Medication 10 ML: at 10:08

## 2024-01-01 RX ADMIN — SODIUM BICARBONATE 50 MEQ: 84 INJECTION INTRAVENOUS at 10:54

## 2024-01-01 RX ADMIN — EPINEPHRINE 1 MG: 0.1 INJECTION INTRAVENOUS at 11:04

## 2024-01-01 RX ADMIN — Medication 0.02 MCG/KG/MIN: at 06:28

## 2024-01-01 RX ADMIN — BUDESONIDE 0.5 MG: 0.5 SUSPENSION RESPIRATORY (INHALATION) at 20:06

## 2024-01-01 RX ADMIN — METHYLPREDNISOLONE SODIUM SUCCINATE 40 MG: 40 INJECTION INTRAMUSCULAR; INTRAVENOUS at 21:05

## 2024-01-01 RX ADMIN — AMIODARONE HYDROCHLORIDE 0.5 MG/MIN: 1.8 INJECTION, SOLUTION INTRAVENOUS at 05:27

## 2024-01-01 RX ADMIN — IPRATROPIUM BROMIDE AND ALBUTEROL SULFATE 3 ML: .5; 3 SOLUTION RESPIRATORY (INHALATION) at 01:18

## 2024-01-01 RX ADMIN — DILTIAZEM HYDROCHLORIDE 10 MG: 5 INJECTION INTRAVENOUS at 06:31

## 2024-01-01 RX ADMIN — ARFORMOTEROL TARTRATE 15 MCG: 15 SOLUTION RESPIRATORY (INHALATION) at 11:35

## 2024-01-01 RX ADMIN — Medication 0.24 MCG/KG/MIN: at 08:01

## 2024-01-01 RX ADMIN — PROPOFOL 15 MCG/KG/MIN: 10 INJECTION, EMULSION INTRAVENOUS at 10:59

## 2024-01-01 RX ADMIN — VANCOMYCIN HYDROCHLORIDE 1000 MG: 5 INJECTION, POWDER, LYOPHILIZED, FOR SOLUTION INTRAVENOUS at 21:05

## 2024-01-01 RX ADMIN — MAGNESIUM SULFATE HEPTAHYDRATE 2 G: 40 INJECTION, SOLUTION INTRAVENOUS at 14:52

## 2024-01-01 RX ADMIN — SODIUM CHLORIDE 1000 ML: 9 INJECTION, SOLUTION INTRAVENOUS at 11:52

## 2024-01-01 RX ADMIN — ATROPINE SULFATE 1 MG: 0.1 INJECTION INTRAVENOUS at 10:53

## 2024-01-01 RX ADMIN — EPINEPHRINE 1 MG: 0.1 INJECTION INTRAVENOUS at 10:48

## 2024-01-01 RX ADMIN — SODIUM BICARBONATE 50 MEQ: 84 INJECTION INTRAVENOUS at 10:58

## 2024-01-01 RX ADMIN — CEFEPIME 2000 MG: 2 INJECTION, POWDER, FOR SOLUTION INTRAVENOUS at 17:53

## 2024-01-01 RX ADMIN — SODIUM BICARBONATE 50 MEQ: 84 INJECTION INTRAVENOUS at 11:19

## 2024-01-01 RX ADMIN — EPINEPHRINE 1 MG: 0.1 INJECTION INTRAVENOUS at 11:07

## 2024-01-01 RX ADMIN — VASOPRESSIN 0.04 UNITS/MIN: 0.2 INJECTION INTRAVENOUS at 16:41

## 2024-01-01 RX ADMIN — Medication 10 ML: at 10:15

## 2024-01-01 RX ADMIN — MAGNESIUM SULFATE HEPTAHYDRATE 4 G: 4 INJECTION, SOLUTION INTRAVENOUS at 16:19

## 2024-01-01 RX ADMIN — FUROSEMIDE 80 MG: 10 INJECTION, SOLUTION INTRAMUSCULAR; INTRAVENOUS at 08:59

## 2024-01-01 RX ADMIN — IPRATROPIUM BROMIDE AND ALBUTEROL SULFATE 3 ML: .5; 3 SOLUTION RESPIRATORY (INHALATION) at 20:06

## 2024-01-01 RX ADMIN — CEFEPIME 2000 MG: 2 INJECTION, POWDER, FOR SOLUTION INTRAVENOUS at 06:26

## 2024-01-01 RX ADMIN — HEPARIN SODIUM 5000 UNITS: 5000 INJECTION INTRAVENOUS; SUBCUTANEOUS at 21:05

## 2024-01-01 RX ADMIN — AMIODARONE HYDROCHLORIDE 0.5 MG/MIN: 1.8 INJECTION, SOLUTION INTRAVENOUS at 07:39

## 2024-01-01 RX ADMIN — HEPARIN SODIUM 5000 UNITS: 5000 INJECTION INTRAVENOUS; SUBCUTANEOUS at 14:42

## 2024-01-01 RX ADMIN — VANCOMYCIN HYDROCHLORIDE 2000 MG: 5 INJECTION, POWDER, LYOPHILIZED, FOR SOLUTION INTRAVENOUS at 07:43

## 2024-01-01 RX ADMIN — SODIUM BICARBONATE 50 MEQ: 84 INJECTION INTRAVENOUS at 10:42

## 2024-01-01 RX ADMIN — VASOPRESSIN 0.04 UNITS/MIN: 0.2 INJECTION INTRAVENOUS at 08:01

## 2024-01-01 RX ADMIN — NOREPINEPHRINE BITARTRATE 0.02 MCG/KG/MIN: 0.03 INJECTION, SOLUTION INTRAVENOUS at 06:33

## 2024-01-01 RX ADMIN — SULFUR HEXAFLUORIDE 5 ML: KIT at 12:55

## 2024-01-01 RX ADMIN — Medication 0.28 MCG/KG/MIN: at 17:24

## 2024-01-01 RX ADMIN — Medication 0.02 MCG/KG/MIN: at 06:33

## 2024-01-01 RX ADMIN — VASOPRESSIN 0.04 UNITS/MIN: 0.2 INJECTION INTRAVENOUS at 05:27

## 2024-01-01 RX ADMIN — SODIUM CHLORIDE 1000 ML: 9 INJECTION, SOLUTION INTRAVENOUS at 06:16

## 2024-01-01 RX ADMIN — EPINEPHRINE 1 MG: 0.1 INJECTION INTRAVENOUS at 10:40

## 2024-01-01 RX ADMIN — ARFORMOTEROL TARTRATE 15 MCG: 15 SOLUTION RESPIRATORY (INHALATION) at 20:07

## 2024-01-01 RX ADMIN — CALCIUM CHLORIDE INJECTION 1 G: 100 INJECTION, SOLUTION INTRAVENOUS at 10:38

## 2024-01-01 RX ADMIN — FAMOTIDINE 20 MG: 10 INJECTION INTRAVENOUS at 08:59

## 2024-01-01 RX ADMIN — SODIUM CHLORIDE, POTASSIUM CHLORIDE, SODIUM LACTATE AND CALCIUM CHLORIDE 120 ML/HR: 600; 310; 30; 20 INJECTION, SOLUTION INTRAVENOUS at 13:37

## 2024-01-01 RX ADMIN — METHYLPREDNISOLONE SODIUM SUCCINATE 40 MG: 40 INJECTION INTRAMUSCULAR; INTRAVENOUS at 03:45

## 2024-01-01 RX ADMIN — CALCIUM CHLORIDE INJECTION 1 G: 100 INJECTION, SOLUTION INTRAVENOUS at 10:42

## 2024-01-01 RX ADMIN — AMIODARONE HYDROCHLORIDE 0.5 MG/MIN: 1.8 INJECTION, SOLUTION INTRAVENOUS at 18:10

## 2024-01-01 RX ADMIN — AMIODARONE HYDROCHLORIDE 150 MG: 1.5 INJECTION, SOLUTION INTRAVENOUS at 07:00

## 2024-01-01 RX ADMIN — CEFEPIME 2000 MG: 2 INJECTION, POWDER, FOR SOLUTION INTRAVENOUS at 05:24

## 2024-01-01 RX ADMIN — Medication 1 MCG/KG/MIN: at 11:12

## 2024-01-01 RX ADMIN — BUDESONIDE 0.5 MG: 0.5 SUSPENSION RESPIRATORY (INHALATION) at 11:35

## 2024-01-01 RX ADMIN — Medication 0.28 MCG/KG/MIN: at 22:31

## 2024-01-01 RX ADMIN — PROCHLORPERAZINE EDISYLATE 5 MG: 5 INJECTION INTRAMUSCULAR; INTRAVENOUS at 08:21

## 2024-01-01 RX ADMIN — SODIUM CHLORIDE, POTASSIUM CHLORIDE, SODIUM LACTATE AND CALCIUM CHLORIDE 2000 ML: 600; 310; 30; 20 INJECTION, SOLUTION INTRAVENOUS at 13:34

## 2024-01-01 RX ADMIN — ARFORMOTEROL TARTRATE 15 MCG: 15 SOLUTION RESPIRATORY (INHALATION) at 07:55

## 2024-01-01 RX ADMIN — SODIUM BICARBONATE 150 MEQ: 84 INJECTION, SOLUTION INTRAVENOUS at 06:29

## 2024-01-01 RX ADMIN — DILTIAZEM HYDROCHLORIDE 10 MG: 5 INJECTION, SOLUTION INTRAVENOUS at 06:31

## 2024-01-01 RX ADMIN — CEFTRIAXONE SODIUM 2000 MG: 2 INJECTION, POWDER, FOR SOLUTION INTRAMUSCULAR; INTRAVENOUS at 10:32

## 2024-01-01 RX ADMIN — ATROPINE SULFATE 0.5 MG: 0.1 INJECTION INTRAVENOUS at 10:45

## 2024-01-01 RX ADMIN — EPINEPHRINE 1 MG: 0.1 INJECTION INTRAVENOUS at 11:18

## 2024-01-01 RX ADMIN — LIDOCAINE HYDROCHLORIDE: 20 JELLY TOPICAL at 18:03

## 2024-01-01 RX ADMIN — IPRATROPIUM BROMIDE AND ALBUTEROL SULFATE 3 ML: .5; 3 SOLUTION RESPIRATORY (INHALATION) at 11:35

## 2024-01-01 RX ADMIN — SODIUM CHLORIDE 1000 ML: 9 INJECTION, SOLUTION INTRAVENOUS at 06:39

## 2024-01-01 RX ADMIN — IPRATROPIUM BROMIDE AND ALBUTEROL SULFATE 3 ML: .5; 3 SOLUTION RESPIRATORY (INHALATION) at 07:54

## 2024-01-01 RX ADMIN — EPINEPHRINE 1 MG: 0.1 INJECTION INTRAVENOUS at 10:37

## 2024-01-01 RX ADMIN — SODIUM BICARBONATE 50 MEQ: 84 INJECTION INTRAVENOUS at 10:48

## 2024-01-01 RX ADMIN — IOPAMIDOL 100 ML: 755 INJECTION, SOLUTION INTRAVENOUS at 06:20

## 2024-01-01 RX ADMIN — EPINEPHRINE 1 MG: 0.1 INJECTION INTRAVENOUS at 10:53

## 2024-01-01 RX ADMIN — HEPARIN SODIUM 5000 UNITS: 5000 INJECTION INTRAVENOUS; SUBCUTANEOUS at 05:25

## 2024-01-01 RX ADMIN — BUDESONIDE 0.5 MG: 0.5 SUSPENSION RESPIRATORY (INHALATION) at 07:55

## 2024-01-01 RX ADMIN — EPINEPHRINE 1 MG: 0.1 INJECTION INTRAVENOUS at 10:43

## 2024-01-01 RX ADMIN — CALCIUM CHLORIDE INJECTION 1 G: 100 INJECTION, SOLUTION INTRAVENOUS at 10:58

## 2024-01-01 RX ADMIN — ROCURONIUM BROMIDE 50 MG: 10 INJECTION INTRAVENOUS at 10:56

## 2024-01-01 RX ADMIN — AZITHROMYCIN 500 MG: 500 INJECTION, POWDER, LYOPHILIZED, FOR SOLUTION INTRAVENOUS at 09:06

## 2024-01-01 RX ADMIN — EPINEPHRINE 1 MG: 0.1 INJECTION INTRAVENOUS at 11:10

## 2024-01-01 RX ADMIN — SODIUM BICARBONATE 50 MEQ: 84 INJECTION INTRAVENOUS at 10:38

## 2024-01-05 ENCOUNTER — OFFICE VISIT (OUTPATIENT)
Dept: FAMILY MEDICINE CLINIC | Facility: CLINIC | Age: 79
End: 2024-01-05
Payer: COMMERCIAL

## 2024-01-05 VITALS
DIASTOLIC BLOOD PRESSURE: 55 MMHG | OXYGEN SATURATION: 96 % | BODY MASS INDEX: 29.53 KG/M2 | HEART RATE: 74 BPM | WEIGHT: 218 LBS | HEIGHT: 72 IN | SYSTOLIC BLOOD PRESSURE: 110 MMHG

## 2024-01-05 DIAGNOSIS — M25.512 ACUTE PAIN OF BOTH SHOULDERS: Primary | ICD-10-CM

## 2024-01-05 DIAGNOSIS — M25.511 ACUTE PAIN OF BOTH SHOULDERS: Primary | ICD-10-CM

## 2024-01-05 NOTE — PROGRESS NOTES
Subjective:       Asaf Chilel is a 78 y.o. male who presents for follow-up for neck and shoulder pain.    Mr. Chilel is normally seen by my colleague, his PCP Naheed Mane.  I saw him over the holiday on 12/22/2023 for an acute sick visit for neck and shoulder pain.  Please refer to that office note for further details.    In summary, Mr. Chilel had reported neck and shoulder pain of approximately 3 days duration.  Neck pain has begun after visit to chiropractor who manipulated neck and shoulders.  I treated him conservatively with Mobic 15 mg (I only provided a 2-week course of this medication because a history of heart failure appears in the chart - Mr. Chilel himself denies that he was aware of a significant history of heart failure), Zanaflex muscle relaxer, and steroids.    I obtained x-rays of the neck and shoulder that demonstrated degenerative changes in the shoulder and multilevel degenerative changes in the cervical spine.  We reviewed those today.    Today, Mr. Chilel states that his symptoms have improved significantly.  His range of motion in his shoulders is also much better.  I will discontinue the Mobic, steroids, and Zanaflex at this time and remove them from his chart.    Nevertheless, I would still recommend completing a course of physical therapy now that his acute symptoms have lessened.  Given the degree of inflammation that he had at our visit, he may ultimately benefit from MRI of the shoulder to rule out adhesive capsulitis.    Mr. Chilel tells me that he will consider this plan.  [facility].  He prefers to revisit this issue with his PCP after giving it more time to see if his symptoms return.  I will get him set up for an appointment with her in February.    The following portions of the patient's history were reviewed and updated as appropriate: allergies, current medications, past family history, past  medical history, past social history, past surgical history, and problem list.    Past Medical Hx:  Past Medical History:   Diagnosis Date    Anxiety     Arthritis 20 years    Asthma 5 years    Cancer     Cardiomyopathy     Cataract 5years    CHF (congestive heart failure) 2015    Surgery    COPD (chronic obstructive pulmonary disease)     Coronary artery disease     Diverticulosis 20 years    Eating disorder     Erectile dysfunction 5 years    Gastroesophageal reflux     Gout     Headache     High cholesterol     Hip pain     Hoarseness, chronic     Hypertension     Hypothyroid     AJ (iron deficiency anemia) 05/07/2015    Inflammatory bowel disease     Limb pain     Lumbago 09/03/2013    MRI shows abnormal appearance of bone marrow. Workup negative pet hem/onc    Myocardial infarction     Pancreatitis     Peripheral vascular disease 03/28/2015    Pneumonia     Sciatica 09/03/2013    Scoliosis     Tremor     Visual impairment 2015    Surgery       Past Surgical Hx:  Past Surgical History:   Procedure Laterality Date    CARDIAC SURGERY  2015    COLONOSCOPY  2014    normal Dr. Bingham    COLONOSCOPY N/A 04/27/2022    Procedure: COLONOSCOPY WITH POLYPECTOMY;  Surgeon: Juarez Seo MD;  Location: [facility] ENDOSCOPY;  Service: General;  Laterality: N/A;  DIVERTICULOSIS, COLON POLYP    CORONARY ARTERY BYPASS GRAFT      CORONARY STENT PLACEMENT  2015    ENDOSCOPY N/A 04/27/2022    Procedure: ESOPHAGOGASTRODUODENOSCOPY WITH BIOPSIES AND POLYPECTOMY;  Surgeon: Juarez Seo MD;  Location: [facility] ENDOSCOPY;  Service: General;  Laterality: N/A;  GASTRIC POLYPS    EYE SURGERY Right     cancer removed x 2    JOINT REPLACEMENT      LYMPH NODE BIOPSY      MANDIBLE SURGERY      cancer removed    SHOULDER SURGERY Right        Current Meds:    Current Outpatient Medications:     albuterol sulfate  (90 Base) MCG/ACT inhaler, Inhale 1 puff Every 4 (Four) Hours As Needed for Shortness of Air., Disp: 6.7 g, Rfl: 2     allopurinol (ZYLOPRIM) 300 MG tablet, TAKE 1 TABLET BY MOUTH EVERY DAY, Disp: 90 tablet, Rfl: 1    amitriptyline (ELAVIL) 25 MG tablet, TAKE 1 TABLET BY MOUTH EVERYDAY AT BEDTIME, Disp: 90 tablet, Rfl: 1    aspirin 81 MG EC tablet, Take 1 tablet by mouth Daily. Last dose Friday 4/22/22, Disp: , Rfl:     atorvastatin (LIPITOR) 80 MG tablet, TAKE 1 TABLET BY MOUTH EVERY DAY, Disp: 90 tablet, Rfl: 1    busPIRone (BUSPAR) 10 MG tablet, TAKE 1 TABLET BY MOUTH THREE TIMES A DAY, Disp: 90 tablet, Rfl: 1    clotrimazole-betamethasone (LOTRISONE) 1-0.05 % cream, APPLY TOPICALLY TO THE APPROPRIATE AREA TWICE A DAY AS DIRECTED, Disp: 45 g, Rfl: 0    ferrous sulfate 325 (65 FE) MG tablet, TAKE 1 TABLET BY MOUTH EVERY DAY WITH BREAKFAST, Disp: 90 tablet, Rfl: 1    Fluticasone-Umeclidin-Vilant (Trelegy Ellipta) 100-62.5-25 MCG/ACT inhaler, Inhale 1 puff Daily. Indications: 2 box, first box Lot: [id number] EXP: 7/2024, 2nd box LOT: [id number] EXP: 4/2025, Disp: 28 each, Rfl: 2    icosapent ethyl (Vascepa) 1 g capsule capsule, Take 2 g by mouth 2 (Two) Times a Day With Meals., Disp: 360 capsule, Rfl: 3    ketoconazole (NIZORAL) 2 % shampoo, Apply 1 application topically to the appropriate area as directed 1 (One) Time Per Week., Disp: , Rfl:     lisinopril (PRINIVIL,ZESTRIL) 40 MG tablet, TAKE 1 TABLET BY MOUTH EVERY DAY, Disp: 90 tablet, Rfl: 1    meclizine (ANTIVERT) 25 MG tablet, TAKE 1 TABLET BY MOUTH THREE TIMES A DAY AS NEEDED FOR NAUSEA, Disp: 270 tablet, Rfl: 1    multivitamin with minerals tablet tablet, Take 1 tablet by mouth Daily., Disp: , Rfl:     omeprazole (priLOSEC) 20 MG capsule, TAKE 2 CAPSULES BY MOUTH EVERY DAY, Disp: 180 capsule, Rfl: 1    propranolol (INDERAL) 40 MG tablet, TAKE 1 TABLET BY MOUTH THREE TIMES A DAY, Disp: 270 tablet, Rfl: 1    spironolactone (ALDACTONE) 25 MG tablet, Take 1 tablet daily except  on Sundays take 0.5 tablet, Disp: 90 tablet, Rfl: 3    Synthroid 88 MCG tablet, Take 1 tablet by mouth., Disp: ,  "Rfl:     Vitamins A & D (magic barrier cream), Apply 1 application  topically to the appropriate area as directed 2 (Two) Times a Day As Needed (rash)., Disp: 60 g, Rfl: 0    Fluticasone-Umeclidin-Vilant (Trelegy Ellipta) 100-62.5-25 MCG/ACT inhaler, Inhale 1 puff Daily. Indications: 2 box, first box Lot: [id number] EXP: 3/2024, 2nd box LOT: [id number] EXP: 2024 (Patient not taking: Reported on 2023), Disp: 180 each, Rfl: 1    Allergies:  No Known Allergies    Family Hx:  Family History   Problem Relation Age of Onset    Cancer Mother     Heart disease Mother     Hyperlipidemia Mother     Hypertension Mother     Cancer Father         Lung    Arthritis Father     Cancer Brother         Throat    Cancer Other     Heart failure Other     Other Other         spine problems     Cancer Brother         Brain    Cancer Brother         Neck    Thyroid disease Brother     Malig Hyperthermia Neg Hx         Social History:  Social History     Socioeconomic History    Marital status:    Tobacco Use    Smoking status: Former     Packs/day: 1.00     Years: 44.00     Additional pack years: 0.00     Total pack years: 44.00     Types: Cigarettes     Start date: 1956     Quit date: 2000     Years since quittin.0    Smokeless tobacco: Never   Vaping Use    Vaping Use: Never used   Substance and Sexual Activity    Alcohol use: Not Currently     Comment: Quit     Drug use: Never    Sexual activity: Not Currently     Partners: Female     Birth control/protection: Pill, Surgical     Comment: O       Review of Systems  Review of Systems   Musculoskeletal:  Negative for arthralgias (shoulder pain improved) and neck pain.       Objective:     /55   Pulse 74   Ht 182.9 cm (72\")   Wt 98.9 kg (218 lb)   SpO2 96%   BMI 29.57 kg/m²   Physical Exam  Constitutional:       General: He is not in acute distress.     Appearance: Normal appearance. He is not ill-appearing, toxic-appearing or diaphoretic.   Musculoskeletal: "      Cervical back: Normal range of motion.      Comments: Active range of movement significantly improved   Neurological:      Mental Status: He is alert.          Assessment/Plan:     Diagnoses and all orders for this visit:    1. Acute pain of both shoulders (Primary)    Mr. Chilel is normally seen by my colleague, his PCP Naheed Mane.  I saw him over the holiday on 12/22/2023 for an acute sick visit for neck and shoulder pain.  Please refer to that office note for further details.    In summary, Mr. Chilel had reported neck and shoulder pain of approximately 3 days duration.  Neck pain has begun after visit to chiropractor who manipulated neck and shoulders.  I treated him conservatively with Mobic 15 mg (I only provided a 2-week course of this medication because a history of heart failure appears in the chart - Mr. Chilel himself denies that he was aware of a significant history of heart failure), Zanaflex muscle relaxer, and steroids.    I obtained x-rays of the neck and shoulder that demonstrated degenerative changes in the shoulder and multilevel degenerative changes in the cervical spine.  We reviewed those today.    Today, Mr. Chilel states that his symptoms have improved significantly.  His range of motion in his shoulders is also much better.  I will discontinue the Mobic, steroids, and Zanaflex at this time and remove them from his chart.    Nevertheless, I would still recommend completing a course of physical therapy now that his acute symptoms have lessened.  Given the degree of inflammation that he had at our visit, he may ultimately benefit from MRI of the shoulder to rule out adhesive capsulitis.    Mr. Chilel tells me that he will consider this plan.  [facility].  He prefers to revisit this issue with his PCP after giving it more time to see if his symptoms return.  I will get him set up for an appointment with her in  February.    Rx changes: Discontinuing the as needed steroids, anti-inflammatory, and muscle relaxer that I prescribed for short course only    Follow-up:     Return in about 5 weeks (around 2/9/2024).    Preventative:  Health Maintenance   Topic Date Due    TDAP/TD VACCINES (1 - Tdap) 05/11/2024 (Originally 6/6/1964)    ZOSTER VACCINE (2 of 3) 08/21/2024 (Originally 12/27/2009)    GASTROSCOPY (EGD)  04/27/2024    LIPID PANEL  05/15/2024    ANNUAL WELLNESS VISIT  08/21/2024    BMI FOLLOWUP  08/21/2024    HEPATITIS C SCREENING  Completed    COVID-19 Vaccine  Completed    INFLUENZA VACCINE  Completed    Pneumococcal Vaccine 65+  Completed    COLORECTAL CANCER SCREENING  Discontinued       This document has been electronically signed by Chris Low MD on January 5, 2024 09:09 EST       Parts of this note are electronic transcriptions/translations of spoken language to printed text using the Dragon Dictation system.

## 2024-01-08 ENCOUNTER — TELEPHONE (OUTPATIENT)
Dept: FAMILY MEDICINE CLINIC | Facility: CLINIC | Age: 79
End: 2024-01-08
Payer: COMMERCIAL

## 2024-01-08 DIAGNOSIS — M25.511 ACUTE PAIN OF RIGHT SHOULDER: Primary | ICD-10-CM

## 2024-01-08 NOTE — TELEPHONE ENCOUNTER
Caller: Asaf Chilel    Relationship: Self    Best call back number: [phone]     What is the medical concern/diagnosis: SHOULDER PAIN    What specialty or service is being requested: PHYSICAL THERAPY    What is the provider, practice or medical service name: [facility]     What is the office location: [place]    What is the office phone number: [phone]

## 2024-01-08 NOTE — TELEPHONE ENCOUNTER
Pt is requesting PT orders for PT Practices.    Noted on x-ray from 12/22/23 for PT.     Please sign order

## 2024-01-21 DIAGNOSIS — F41.9 ANXIETY: ICD-10-CM

## 2024-01-22 RX ORDER — BUSPIRONE HYDROCHLORIDE 10 MG/1
TABLET ORAL
Qty: 90 TABLET | Refills: 1 | Status: SHIPPED | OUTPATIENT
Start: 2024-01-22

## 2024-01-25 ENCOUNTER — TELEPHONE (OUTPATIENT)
Dept: FAMILY MEDICINE CLINIC | Facility: CLINIC | Age: 79
End: 2024-01-25
Payer: MEDICARE

## 2024-01-25 NOTE — TELEPHONE ENCOUNTER
Caller: Asaf Chilel    Relationship to patient: Self    Best call back number: [phone]     Patient is needing: PATIENT STATED THAT HE HAD A FALL PRIOR TO HIS 1/5/24 APPOINTMENT.   HE HAS BEEN GOING TO PHYSICAL THERAPY AND INFORMS THAT IS NOT HELPING. STATED THAT HIS LEFT SHOULDER HAS SHARP PAINS FROM SHOULDER TO HAND AND HAS ISSUES RAISING THIS ARM.   PATIENT IS REQUESTING AN MRI OR X-RAY SOMEWHERE CLOSE.TO HOME

## 2024-01-26 DIAGNOSIS — M25.512 CHRONIC LEFT SHOULDER PAIN: Primary | ICD-10-CM

## 2024-01-26 DIAGNOSIS — G89.29 CHRONIC LEFT SHOULDER PAIN: Primary | ICD-10-CM

## 2024-01-26 NOTE — TELEPHONE ENCOUNTER
Mr. Chilel is a very pleasant gentleman who is a patient of my colleague, PCP Naheed Mane.  I saw him in December and early this month for neck and shoulder pain.  Although he had improved with initial therapy, I did tell him that he might ultimately benefit from MRI of the shoulder and referral to orthopedic surgery.  He called back requesting MRI and is agreeable to referral to orthopedic surgery.  I have placed these orders today and I called him personally to let him know of this.      This document has been electronically signed by Chris Low MD on January 26, 2024 16:14 EST

## 2024-01-26 NOTE — TELEPHONE ENCOUNTER
Left detailed message asking which shoulder pt would like to have MRI done on.     OK FOR HUB TO RELAY MESSAGE

## 2024-01-26 NOTE — TELEPHONE ENCOUNTER
Name: Asaf Chilel    Relationship: Self    Best Callback Number: [phone]    HUB PROVIDED THE RELAY MESSAGE FROM THE OFFICE   PATIENT VOICED UNDERSTANDING AND HAS NO FURTHER QUESTIONS AT THIS TIME    ADDITIONAL INFORMATION: PATIENT STATES HIS LEFT SHOULDER IS CAUSING HIM THE MOST ISSUES/PAIN BUT HE IS ALSO HAVING PAIN IN THE RIGHT SHOULDER.

## 2024-01-30 ENCOUNTER — TELEPHONE (OUTPATIENT)
Dept: FAMILY MEDICINE CLINIC | Facility: CLINIC | Age: 79
End: 2024-01-30
Payer: MEDICARE

## 2024-01-30 NOTE — TELEPHONE ENCOUNTER
CALLED [facility] PER REFERRAL NOTE ORTHO WONT BE SCHEDULED UNTIL PT COMPLETES MRI APT FOR THAT IS 02/28/2024

## 2024-01-30 NOTE — TELEPHONE ENCOUNTER
OK FOR HUB TO RELAY  \THERE IS ANOTHER OPEN ENCOUNTER ON THIS PT AS WELL AS HE WAS WANTING TO KNOW IF HE CAN GET SCHEDULED WITH ORTHO PER REFERRAL PT HAS TO COMPLETE MRI FIRST BEFORE HE CAN BE SEEN BY ORTHO I  LVM FOR PT TO CALL OFFICE AS FAR AS WANTING MRI RESCHEDULED FOR SOONER TIME PT WILL NEED TO CALL HOSPITAL TO SEE IF THEY CAN GET IN SOONER AS OUR OFFICE HAS NO CONTROL OVER THE SCHEDULING\..

## 2024-01-30 NOTE — TELEPHONE ENCOUNTER
Caller: Asaf Chilel    Relationship: Self    Best call back number:     [phone]       What is the best time to reach you: ANY    Who are you requesting to speak with (clinical staff, provider,  specific staff member): CLINICAL        What was the call regarding: STATUS OF ORTHOPEDIC SURGEON REFERRAL - STILL HAVING A LOT OF SHOULDER PAIN     PLEASE ADVISE

## 2024-01-30 NOTE — TELEPHONE ENCOUNTER
Caller: Asaf Chilel    Relationship: Self    Best call back number: [phone]    What was the call regarding: PATIENT WAS INFORMED BY INSURANCE THAT THEY WILL COVER MRI, PATIENT IS CURRENTLY SCHEDULED FOR 02/28/2024, BUT HE WOULD LIKE TO HAVE THIS DONE SOONER. HE IS CHECKING TO SEE IF THERE IS A WAY THAT CAN BE DONE, AND TO LET HIM KNOW.

## 2024-01-30 NOTE — TELEPHONE ENCOUNTER
S/W pt. Ortho referral pending. Results from MRI wanted first. [facility] is working on getting MRI approved. Pt aware    Pt was given number to scheduling to further discuss MRI and to get scheduled.

## 2024-01-31 NOTE — TELEPHONE ENCOUNTER
S/W pt. Pt was given number to scheduling to try to get MRI moved to sooner date or different location with a sooner date.

## 2024-02-01 DIAGNOSIS — M25.511 CHRONIC RIGHT SHOULDER PAIN: Primary | ICD-10-CM

## 2024-02-01 DIAGNOSIS — G89.29 CHRONIC RIGHT SHOULDER PAIN: Primary | ICD-10-CM

## 2024-02-19 ENCOUNTER — HOSPITAL ENCOUNTER (OUTPATIENT)
Dept: MRI IMAGING | Facility: HOSPITAL | Age: 79
Discharge: HOME | End: 2024-02-19
Payer: MEDICARE

## 2024-02-19 DIAGNOSIS — G89.29 CHRONIC LEFT SHOULDER PAIN: ICD-10-CM

## 2024-02-19 DIAGNOSIS — M25.512 CHRONIC LEFT SHOULDER PAIN: ICD-10-CM

## 2024-02-19 DIAGNOSIS — M25.511 CHRONIC RIGHT SHOULDER PAIN: ICD-10-CM

## 2024-02-19 DIAGNOSIS — G89.29 CHRONIC RIGHT SHOULDER PAIN: ICD-10-CM

## 2024-02-19 PROCEDURE — 73221 MRI JOINT UPR EXTREM W/O DYE: CPT

## 2024-02-20 NOTE — PROGRESS NOTES
I have reviewed the MRI results for Asaf.  He does have not just mild osteoarthritis with an effusion in the shoulder but also subacromial bursitis and a small tear of his supraspinatus tendon, part of the rotator cuff, and moderate biceps tendinopathy as well.  I had already referred him to orthopedic surgery and can we make sure that he has an appointment with them that is coming up when we call him to let him know of these results?    Thank you,    Chris Low    ?  This document has been electronically signed by Chris Low MD on February 20, 2024 11:23 EST

## 2024-02-20 NOTE — PROGRESS NOTES
Earlier, I routed results from MRI of the opposite shoulder.  The left shoulder has also returned and does demonstrate severe osteoarthritis and a supraspinatus tendon tear.  This is part of the rotator cuff.  As I mentioned in my note on his other MRI, he is already been to orthopedic surgery.  Can we call him and let him know of these results?    Thank you,    Chris Low    ?  This document has been electronically signed by Chris Low MD on February 20, 2024 17:52 EST

## 2024-02-21 ENCOUNTER — TELEPHONE (OUTPATIENT)
Dept: FAMILY MEDICINE CLINIC | Facility: CLINIC | Age: 79
End: 2024-02-21
Payer: MEDICARE

## 2024-02-21 NOTE — TELEPHONE ENCOUNTER
Caller: Asaf Chilel    Relationship: Self    Best call back number: [phone]     What is the best time to reach you: ANY    Who are you requesting to speak with (clinical staff, provider,  specific staff member): CLINICAL STAFF    What was the call regarding: PATIENT CALLED STATING THAT HE HAS NOT HEARD ANYTHING REGARDING HIS ORTHOPEDIC SURGEON REFERRAL AND WOULD LIKE AN UPDATE

## 2024-02-21 NOTE — TELEPHONE ENCOUNTER
S/W pt. Number provided to office via my chart per pt request. Ortho referral was set to schedule after 2/28 when original MRI was scheduled. Referral fixed and routed to schedule.

## 2024-02-23 ENCOUNTER — OFFICE VISIT (OUTPATIENT)
Dept: ORTHOPEDIC SURGERY | Facility: CLINIC | Age: 79
End: 2024-02-23
Payer: MEDICARE

## 2024-02-23 VITALS
SYSTOLIC BLOOD PRESSURE: 156 MMHG | OXYGEN SATURATION: 95 % | HEIGHT: 72 IN | DIASTOLIC BLOOD PRESSURE: 77 MMHG | BODY MASS INDEX: 29.12 KG/M2 | WEIGHT: 215 LBS | HEART RATE: 65 BPM

## 2024-02-23 DIAGNOSIS — M75.51 ACUTE BURSITIS OF RIGHT SHOULDER: ICD-10-CM

## 2024-02-23 DIAGNOSIS — M75.102 TEAR OF LEFT ROTATOR CUFF, UNSPECIFIED TEAR EXTENT, UNSPECIFIED WHETHER TRAUMATIC: ICD-10-CM

## 2024-02-23 DIAGNOSIS — M75.21 BICEPS TENDONITIS ON RIGHT: ICD-10-CM

## 2024-02-23 DIAGNOSIS — M19.019 OSTEOARTHRITIS OF AC (ACROMIOCLAVICULAR) JOINT: Primary | ICD-10-CM

## 2024-02-23 DIAGNOSIS — M75.22 BICEPS TENDONITIS ON LEFT: ICD-10-CM

## 2024-02-23 DIAGNOSIS — M75.101 TEAR OF RIGHT ROTATOR CUFF, UNSPECIFIED TEAR EXTENT, UNSPECIFIED WHETHER TRAUMATIC: ICD-10-CM

## 2024-02-23 RX ADMIN — LIDOCAINE HYDROCHLORIDE 5 ML: 10 INJECTION, SOLUTION INFILTRATION; PERINEURAL at 10:00

## 2024-02-23 RX ADMIN — TRIAMCINOLONE ACETONIDE 40 MG: 40 INJECTION, SUSPENSION INTRA-ARTICULAR; INTRAMUSCULAR at 10:00

## 2024-02-23 NOTE — PROGRESS NOTES
"Chief Complaint  Initial Evaluation of the Right Shoulder and Initial Evaluation of the Left Shoulder     Subjective      Asaf Chilel presents to [facility] for evaluation of the bilateral shoulder pain. The patient reports he had a fall and had shoulder pain afterwards about 3 weeks ago. He reports decreased ROM of the shoulders. He describes it as a sharp pain. He has no other complaints. He has used topicals and done a round of therapy with out relief. He reports his right is worse than the left.     No Known Allergies     Social History     Socioeconomic History    Marital status:    Tobacco Use    Smoking status: Former     Packs/day: 1.00     Years: 44.00     Additional pack years: 0.00     Total pack years: 44.00     Types: Cigarettes     Start date: 1956     Quit date: 2000     Years since quittin.1    Smokeless tobacco: Never   Vaping Use    Vaping Use: Never used   Substance and Sexual Activity    Alcohol use: Not Currently     Comment: Quit     Drug use: Never    Sexual activity: Not Currently     Partners: Female     Birth control/protection: Pill, Surgical     Comment: O        I reviewed the patient's chief complaint, history of present illness, review of systems, past medical history, surgical history, family history, social history, medications, and allergy list.     Review of Systems     Constitutional: Denies fevers, chills, weight loss  Cardiovascular: Denies chest pain, shortness of breath  Skin: Denies rashes, acute skin changes  Neurologic: Denies headache, loss of consciousness  MSK: bilateral shoulder pain      Vital Signs:   /77   Pulse 65   Ht 182.9 cm (72\")   Wt 97.5 kg (215 lb)   SpO2 95%   BMI 29.16 kg/m²          Physical Exam  General: Alert. No acute distress    Ortho Exam      Right shoulder- Forward elevation 140. Abduction 120. External Rotation 50. Internal rotation 40. No skin discoloration, atrophy or " swelling. Non-tender. 4/5 supraspinatus strength, 5/5 infraspinatus and subscapularis. Internal rotation to L-4. Positive impingement signs. Negative cross arm adduction. Sensation to light touch median, radial, ulnar nerve. Positive AIN, PIN, ulnar nerve motor function. Positive pulses.     Left shoulder- No skin discoloration, atrophy or swelling. Non-tender. Forward elevation 130. Abduction 100, External Rotation 80. Internal rotation 70. 4/5 supraspinatus strength, 5/5 infraspinatus and subscapularis. Negative impingement, negative cross arm adduction. Internal rotation L-5. Sensation to light touch median, radial, ulnar nerve. Positive AIN, PIN, ulnar nerve motor function. Positive pulses.      Right shoulder: R subacromial bursa  Date/Time: 2/23/2024 10:00 AM  Consent given by: patient  Site marked: site marked  Timeout: Immediately prior to procedure a time out was called to verify the correct patient, procedure, equipment, support staff and site/side marked as required   Supporting Documentation  Indications: pain   Procedure Details  Location: shoulder - R subacromial bursa  Needle gauge: 21G.  Medications administered: 5 mL lidocaine 1 %; 40 mg triamcinolone acetonide 40 MG/ML  Patient tolerance: patient tolerated the procedure well with no immediate complications      Left shoulder: L subacromial bursa  Date/Time: 2/23/2024 10:00 AM  Consent given by: patient  Site marked: site marked  Timeout: Immediately prior to procedure a time out was called to verify the correct patient, procedure, equipment, support staff and site/side marked as required   Supporting Documentation  Indications: pain   Procedure Details  Location: shoulder - L subacromial bursa  Needle gauge: 21G.  Medications administered: 5 mL lidocaine 1 %; 40 mg triamcinolone acetonide 40 MG/ML  Patient tolerance: patient tolerated the procedure well with no immediate complications            Imaging Results (Most Recent)       None              Result Review :       MRI Shoulder Left Without Contrast    Result Date: 2/20/2024  Narrative: PROCEDURE: MRI SHOULDER LEFT WO CONTRAST  COMPARISON: [facility], CR, XR SHOULDER 2+ VW LEFT, 12/22/2023, 9:51.  INDICATIONS: PAIN, OSTEOARTHRITIS      TECHNIQUE: A variety of imaging planes and parameters were utilized for visualization of suspected pathology.  Images were performed without contrast.   FINDINGS:  No fracture or malalignment is identified.  Severe acromioclavicular osteoarthritis is noted.  A small AC joint effusion is noted.  Mild to moderate supraspinatus tendinopathy is noted.  At the junction of the supraspinatus and infra spinatus tendons is a tear measuring 0.5 cm AP by 0.4 cm transverse.  There are questionably a few retaining tendon fibers remaining intact.  There is a partial thickness articular/superior surface tear of the distal subscapularis tendon involving approximately 33% of tendon thickness.  The rotator cuff otherwise appears unremarkable.  No muscle body atrophy  Mild intermediate signal abnormality in the biceps long head tendon is consistent with tendinopathy.  There is separation of the anterosuperior labrum from the underlying glenoid favored to represent a sub labral recess.  The labrum otherwise appears unremarkable.  No glenohumeral joint effusion or loose body is seen.  Cartilage in the joint is intact.      Impression:   1. Severe acromioclavicular osteoarthritis 2. Mild-to-moderate supraspinatus tendon 3. Small high-grade partial versus complete tear of the supraspinatus tendon, as detailed above 4. Small partial thickness tear of the superior subscapularis tendon fibers, as above 5. Mild biceps tendinopathy      Jimbo Ennis M.D.       Electronically Signed and Approved By: Jimbo Ennis M.D. on 2/20/2024 at 17:15             MRI Shoulder Right Without Contrast    Result Date: 2/20/2024  Narrative: PROCEDURE: MRI SHOULDER RIGHT WO CONTRAST   COMPARISON: [facility], CR, XR SHOULDER 2+ VW RIGHT, 12/22/2023, 9:53.  INDICATIONS: PAIN, OSTEOARTHRITIS      TECHNIQUE: A variety of imaging planes and parameters were utilized for visualization of suspected pathology.  Images were performed without contrast.   FINDINGS:  No fracture or malalignment is identified.  Marrow signal appears normal.  Mild acromioclavicular osteoarthritis is noted.  A moderate AC joint effusion is noted.  A type 2 acromion is present.  A small amount of fluid is noted in the subdeltoid/subacromial bursa.  There is a tiny intrasubstance tear in the distal supraspinatus tendon at the footprint measuring 0.4 cm x 0.3 cm.  More posteriorly, there is a small partial thickness articular surface tear extending approximately 30% of tendon thickness.  The rotator cuff otherwise appears unremarkable.  There is moderate intermediate signal abnormality in the biceps long head tendon consistent with tendinopathy.  Its insertion onto the superior labrum is intact.  Intermediate signal abnormality in the superior labrum is favored to be degenerative in etiology.  There is a diminutive anterosuperior labrum consistent with a Boles complex.  The labrum otherwise appears unremarkable.  No significant glenohumeral joint effusion or loose body is seen.  Moderate cartilage thinning is noted along the medial aspect of the humeral head.       Impression:   1. Mild acromioclavicular osteoarthritis with moderate joint effusion 2. Subdeltoid/subacromial bursitis 3. Small partial thickness tears of the supraspinatus tendon, as above 4. Moderate biceps tendinopathy      Jimbo Ennis M.D.       Electronically Signed and Approved By: Jimbo Ennis M.D. on 2/20/2024 at 10:44                     Assessment and Plan     Diagnoses and all orders for this visit:    1. Osteoarthritis of AC (acromioclavicular) joint, bilateral (Primary)    2. Tear of left rotator cuff, unspecified tear extent,  unspecified whether traumatic    3. Biceps tendonitis on left    4. Acute bursitis of right shoulder    5. Tear of right rotator cuff, unspecified tear extent, unspecified whether traumatic    6. Biceps tendonitis on right      Discussed the treatment plan with the patient.  I reviewed the images with the patient. Plan for conservative treatment at this time. Discussed the risks and benefits of bilateral shoulder steroid injections. The patient expressed understanding and wished to proceed. He tolerated the injections well. Home exercises given today.     Call or return if worsening symptoms.    Follow Up     6 weeks      Patient was given instructions and counseling regarding his condition or for health maintenance advice. Please see specific information pulled into the AVS if appropriate.     Scribed for Eduardo Salas MD by Lee Ann Forman.  02/23/24   09:25 EST    I have personally performed the services described in this document as scribed by the above individual and it is both accurate and complete. Eduardo Salas MD 02/24/24

## 2024-02-24 RX ORDER — LIDOCAINE HYDROCHLORIDE 10 MG/ML
5 INJECTION, SOLUTION INFILTRATION; PERINEURAL
Status: COMPLETED | OUTPATIENT
Start: 2024-02-23 | End: 2024-02-23

## 2024-02-24 RX ORDER — TRIAMCINOLONE ACETONIDE 40 MG/ML
40 INJECTION, SUSPENSION INTRA-ARTICULAR; INTRAMUSCULAR
Status: COMPLETED | OUTPATIENT
Start: 2024-02-23 | End: 2024-02-23

## 2024-02-29 DIAGNOSIS — F41.9 ANXIETY: ICD-10-CM

## 2024-02-29 RX ORDER — BUSPIRONE HYDROCHLORIDE 10 MG/1
10 TABLET ORAL 3 TIMES DAILY
Qty: 90 TABLET | Refills: 1 | Status: SHIPPED | OUTPATIENT
Start: 2024-02-29

## 2024-03-07 DIAGNOSIS — D50.8 OTHER IRON DEFICIENCY ANEMIA: ICD-10-CM

## 2024-03-07 DIAGNOSIS — K21.9 GASTROESOPHAGEAL REFLUX DISEASE, UNSPECIFIED WHETHER ESOPHAGITIS PRESENT: ICD-10-CM

## 2024-03-07 RX ORDER — FERROUS SULFATE 325(65) MG
TABLET ORAL
Qty: 90 TABLET | Refills: 1 | Status: SHIPPED | OUTPATIENT
Start: 2024-03-07

## 2024-03-07 RX ORDER — OMEPRAZOLE 20 MG/1
CAPSULE, DELAYED RELEASE ORAL
Qty: 180 CAPSULE | Refills: 1 | Status: SHIPPED | OUTPATIENT
Start: 2024-03-07

## 2024-03-07 NOTE — TELEPHONE ENCOUNTER
FERROUS SULFATE  [facility] 06/05/2023  LOV 01/05/2024  F/U 05/16/2024    PRILOSEC  [facility] 08/23/2023  LOV 01/05/2024  F/U 05/16/2024

## 2024-03-14 DIAGNOSIS — I10 HYPERTENSION, UNSPECIFIED TYPE: ICD-10-CM

## 2024-03-14 DIAGNOSIS — R06.02 SHORTNESS OF BREATH: ICD-10-CM

## 2024-03-14 DIAGNOSIS — E78.00 HIGH CHOLESTEROL: Chronic | ICD-10-CM

## 2024-03-14 RX ORDER — ATORVASTATIN CALCIUM 80 MG/1
TABLET, FILM COATED ORAL
Qty: 90 TABLET | Refills: 1 | Status: SHIPPED | OUTPATIENT
Start: 2024-03-14

## 2024-03-14 RX ORDER — PROPRANOLOL HYDROCHLORIDE 40 MG/1
TABLET ORAL
Qty: 270 TABLET | Refills: 1 | Status: SHIPPED | OUTPATIENT
Start: 2024-03-14

## 2024-03-14 RX ORDER — FLUTICASONE FUROATE, UMECLIDINIUM BROMIDE AND VILANTEROL TRIFENATATE 100; 62.5; 25 UG/1; UG/1; UG/1
POWDER RESPIRATORY (INHALATION)
Qty: 180 EACH | Refills: 1 | Status: SHIPPED | OUTPATIENT
Start: 2024-03-14

## 2024-04-05 RX ORDER — MECLIZINE HYDROCHLORIDE 25 MG/1
TABLET ORAL
Qty: 270 TABLET | Refills: 1 | Status: SHIPPED | OUTPATIENT
Start: 2024-04-05

## 2024-04-12 DIAGNOSIS — F41.9 ANXIETY: ICD-10-CM

## 2024-04-12 DIAGNOSIS — M10.9 GOUT, UNSPECIFIED CAUSE, UNSPECIFIED CHRONICITY, UNSPECIFIED SITE: ICD-10-CM

## 2024-04-12 RX ORDER — AMITRIPTYLINE HYDROCHLORIDE 25 MG/1
TABLET, FILM COATED ORAL
Qty: 90 TABLET | Refills: 1 | Status: SHIPPED | OUTPATIENT
Start: 2024-04-12

## 2024-04-12 RX ORDER — ALLOPURINOL 300 MG/1
TABLET ORAL
Qty: 90 TABLET | Refills: 1 | Status: SHIPPED | OUTPATIENT
Start: 2024-04-12

## 2024-04-12 NOTE — TELEPHONE ENCOUNTER
ALLOPURINOL  [facility] 11/27/2023  LOV 01/05/2024  F/U 05/16/2024    AMITRIPTYLINE  [facility] 11/27/2023  LOV 0105/2024  F/U 05/16/2024

## 2024-04-22 ENCOUNTER — TELEPHONE (OUTPATIENT)
Dept: CARDIOLOGY | Facility: CLINIC | Age: 79
End: 2024-04-22
Payer: MEDICARE

## 2024-04-22 DIAGNOSIS — E78.5 HYPERLIPIDEMIA LDL GOAL <70: ICD-10-CM

## 2024-04-22 RX ORDER — ICOSAPENT ETHYL 1 G/1
2 CAPSULE ORAL 2 TIMES DAILY WITH MEALS
Qty: 360 CAPSULE | Refills: 3 | Status: SHIPPED | OUTPATIENT
Start: 2024-04-22

## 2024-04-22 NOTE — TELEPHONE ENCOUNTER
PT CALLED AND IS REQUESTING A REFILL ON ICOSAPENT ETHYL CAPSULES BE SENT TO [facility].  PREVIOUS PRESCRIBER DR MCDANIELS.

## 2024-05-03 ENCOUNTER — OFFICE VISIT (OUTPATIENT)
Dept: ORTHOPEDIC SURGERY | Facility: CLINIC | Age: 79
End: 2024-05-03
Payer: MEDICARE

## 2024-05-03 VITALS
WEIGHT: 215 LBS | SYSTOLIC BLOOD PRESSURE: 143 MMHG | HEIGHT: 72 IN | BODY MASS INDEX: 29.12 KG/M2 | HEART RATE: 69 BPM | OXYGEN SATURATION: 93 % | DIASTOLIC BLOOD PRESSURE: 78 MMHG

## 2024-05-03 DIAGNOSIS — M25.512 LEFT SHOULDER PAIN, UNSPECIFIED CHRONICITY: ICD-10-CM

## 2024-05-03 DIAGNOSIS — M25.511 RIGHT SHOULDER PAIN, UNSPECIFIED CHRONICITY: ICD-10-CM

## 2024-05-03 DIAGNOSIS — M19.012 OSTEOARTHRITIS OF LEFT SHOULDER, UNSPECIFIED OSTEOARTHRITIS TYPE: ICD-10-CM

## 2024-05-03 DIAGNOSIS — M19.019 OSTEOARTHRITIS OF AC (ACROMIOCLAVICULAR) JOINT: Primary | ICD-10-CM

## 2024-05-03 RX ADMIN — LIDOCAINE HYDROCHLORIDE 5 ML: 10 INJECTION, SOLUTION INFILTRATION; PERINEURAL at 10:32

## 2024-05-03 RX ADMIN — TRIAMCINOLONE ACETONIDE 40 MG: 40 INJECTION, SUSPENSION INTRA-ARTICULAR; INTRAMUSCULAR at 10:31

## 2024-05-03 RX ADMIN — LIDOCAINE HYDROCHLORIDE 5 ML: 10 INJECTION, SOLUTION INFILTRATION; PERINEURAL at 10:31

## 2024-05-03 RX ADMIN — TRIAMCINOLONE ACETONIDE 40 MG: 40 INJECTION, SUSPENSION INTRA-ARTICULAR; INTRAMUSCULAR at 10:32

## 2024-05-03 NOTE — PROGRESS NOTES
"Chief Complaint  Follow-up and Pain of the Right Shoulder and Follow-up of the Left Shoulder     Subjective      Asaf Chilel presents to [facility] for follow up of bilateral shoulders.  He reports decreased ROM of the shoulders. He describes it as a sharp pain. He has no other complaints. He has used topicals and done a round of therapy with out relief. He reports his right is worse than the left. He had injections on 24 that gave some relief. The left shoulder is worse then the right.     No Known Allergies     Social History     Socioeconomic History    Marital status:    Tobacco Use    Smoking status: Former     Current packs/day: 0.00     Average packs/day: 1 pack/day for 44.0 years (44.0 ttl pk-yrs)     Types: Cigarettes     Start date: 1956     Quit date: 2000     Years since quittin.3    Smokeless tobacco: Never   Vaping Use    Vaping status: Never Used   Substance and Sexual Activity    Alcohol use: Not Currently     Comment: Quit     Drug use: Never    Sexual activity: Not Currently     Partners: Female     Birth control/protection: Pill, Surgical     Comment: O        I reviewed the patient's chief complaint, history of present illness, review of systems, past medical history, surgical history, family history, social history, medications, and allergy list.     Review of Systems     Constitutional: Denies fevers, chills, weight loss  Cardiovascular: Denies chest pain, shortness of breath  Skin: Denies rashes, acute skin changes  Neurologic: Denies headache, loss of consciousness        Vital Signs:   /78   Pulse 69   Ht 182.9 cm (72\")   Wt 97.5 kg (215 lb)   SpO2 93%   BMI 29.16 kg/m²          Physical Exam  General: Alert. No acute distress    Ortho Exam      Right shoulder- Forward elevation 140. Abduction 120. External Rotation 50. Internal rotation 40. No skin discoloration, atrophy or swelling. Non-tender. 4/5 supraspinatus " strength, 5/5 infraspinatus and subscapularis. Internal rotation to L-4. Positive impingement signs. Negative cross arm adduction. Sensation to light touch median, radial, ulnar nerve. Positive AIN, PIN, ulnar nerve motor function. Positive pulses.      Left shoulder- No skin discoloration, atrophy or swelling. Non-tender. Forward elevation 130. Abduction 100, External Rotation 80. Internal rotation 70. 4/5 supraspinatus strength, 5/5 infraspinatus and subscapularis. Negative impingement, negative cross arm adduction. Internal rotation L-5. Sensation to light touch median, radial, ulnar nerve. Positive AIN, PIN, ulnar nerve motor function. Positive pulses.      Right shoulder: R subacromial bursa  Date/Time: 5/3/2024 10:31 AM  Consent given by: patient  Site marked: site marked  Timeout: Immediately prior to procedure a time out was called to verify the correct patient, procedure, equipment, support staff and site/side marked as required   Supporting Documentation  Indications: pain   Procedure Details  Location: shoulder - R subacromial bursa  Needle gauge: 21G.  Medications administered: 5 mL lidocaine 1 %; 40 mg triamcinolone acetonide 40 MG/ML  Patient tolerance: patient tolerated the procedure well with no immediate complications      Left shoulder: L subacromial bursa  Date/Time: 5/3/2024 10:32 AM  Consent given by: patient  Site marked: site marked  Timeout: Immediately prior to procedure a time out was called to verify the correct patient, procedure, equipment, support staff and site/side marked as required   Supporting Documentation  Indications: pain   Procedure Details  Location: shoulder - L subacromial bursa  Needle gauge: 21G.  Medications administered: 5 mL lidocaine 1 %; 40 mg triamcinolone acetonide 40 MG/ML  Patient tolerance: patient tolerated the procedure well with no immediate complications      This injection documentation was Scribed for Eduardo Salas MD by Maryjane Zhang.  05/03/24    10:32 EDT          Imaging Results (Most Recent)       None             Result Review :       No results found.           Assessment and Plan     Diagnoses and all orders for this visit:    1. Osteoarthritis of AC (acromioclavicular) joint,  right (Primary)  -     Right shoulder: R subacromial bursa  -     Left shoulder: L subacromial bursa    2. Right shoulder pain, unspecified chronicity    3. Left shoulder pain, unspecified chronicity    4. Osteoarthritis of left shoulder, unspecified osteoarthritis type        Discussed the treatment plan with the patient.     Discussed the risks and benefits of conservative measures. The patient expressed understanding and wished to proceed with bilateral shoulder steroid injections.  He tolerated the injections well.     Call or return if worsening symptoms.    Follow Up     PRN      Patient was given instructions and counseling regarding his condition or for health maintenance advice. Please see specific information pulled into the AVS if appropriate.     Scribed for Eduardo Salas MD by Delores Blackwell MA.  05/03/24   10:08 EDT    I have personally performed the services described in this document as scribed by the above individual and it is both accurate and complete. Eduardo Salas MD 05/04/24

## 2024-05-04 RX ORDER — TRIAMCINOLONE ACETONIDE 40 MG/ML
40 INJECTION, SUSPENSION INTRA-ARTICULAR; INTRAMUSCULAR
Status: COMPLETED | OUTPATIENT
Start: 2024-05-03 | End: 2024-05-03

## 2024-05-04 RX ORDER — LIDOCAINE HYDROCHLORIDE 10 MG/ML
5 INJECTION, SOLUTION INFILTRATION; PERINEURAL
Status: COMPLETED | OUTPATIENT
Start: 2024-05-03 | End: 2024-05-03

## 2024-05-08 ENCOUNTER — OFFICE VISIT (OUTPATIENT)
Dept: CARDIOLOGY | Facility: CLINIC | Age: 79
End: 2024-05-08
Payer: MEDICARE

## 2024-05-08 VITALS
HEIGHT: 72 IN | HEART RATE: 57 BPM | BODY MASS INDEX: 29.39 KG/M2 | DIASTOLIC BLOOD PRESSURE: 56 MMHG | WEIGHT: 217 LBS | SYSTOLIC BLOOD PRESSURE: 136 MMHG

## 2024-05-08 DIAGNOSIS — I10 ESSENTIAL HYPERTENSION: ICD-10-CM

## 2024-05-08 DIAGNOSIS — I73.9 PERIPHERAL VASCULAR DISEASE: ICD-10-CM

## 2024-05-08 DIAGNOSIS — I50.42 CHRONIC COMBINED SYSTOLIC AND DIASTOLIC CONGESTIVE HEART FAILURE: ICD-10-CM

## 2024-05-08 DIAGNOSIS — E78.5 HYPERLIPIDEMIA LDL GOAL <70: Primary | ICD-10-CM

## 2024-05-08 PROBLEM — Z98.61 CAD S/P PERCUTANEOUS CORONARY ANGIOPLASTY: Status: RESOLVED | Noted: 2024-05-08 | Resolved: 2024-05-08

## 2024-05-08 PROBLEM — I25.10 CAD S/P PERCUTANEOUS CORONARY ANGIOPLASTY: Status: ACTIVE | Noted: 2024-05-08

## 2024-05-08 PROBLEM — Z98.61 CAD S/P PERCUTANEOUS CORONARY ANGIOPLASTY: Status: ACTIVE | Noted: 2024-05-08

## 2024-05-08 PROBLEM — I25.10 CAD S/P PERCUTANEOUS CORONARY ANGIOPLASTY: Status: RESOLVED | Noted: 2024-05-08 | Resolved: 2024-05-08

## 2024-05-16 ENCOUNTER — OFFICE VISIT (OUTPATIENT)
Dept: FAMILY MEDICINE CLINIC | Facility: CLINIC | Age: 79
End: 2024-05-16
Payer: MEDICARE

## 2024-05-16 VITALS
WEIGHT: 217 LBS | SYSTOLIC BLOOD PRESSURE: 130 MMHG | OXYGEN SATURATION: 98 % | BODY MASS INDEX: 29.39 KG/M2 | DIASTOLIC BLOOD PRESSURE: 58 MMHG | HEIGHT: 72 IN | HEART RATE: 58 BPM

## 2024-05-16 DIAGNOSIS — E03.9 HYPOTHYROIDISM, UNSPECIFIED TYPE: ICD-10-CM

## 2024-05-16 DIAGNOSIS — F41.9 ANXIETY: ICD-10-CM

## 2024-05-16 DIAGNOSIS — E78.5 HYPERLIPIDEMIA, UNSPECIFIED HYPERLIPIDEMIA TYPE: Primary | ICD-10-CM

## 2024-05-16 DIAGNOSIS — K22.70 BARRETT'S ESOPHAGUS WITHOUT DYSPLASIA: ICD-10-CM

## 2024-05-16 DIAGNOSIS — J45.21 MILD INTERMITTENT ASTHMA WITH ACUTE EXACERBATION: ICD-10-CM

## 2024-05-16 DIAGNOSIS — I10 PRIMARY HYPERTENSION: ICD-10-CM

## 2024-05-16 DIAGNOSIS — M10.9 GOUT, UNSPECIFIED CAUSE, UNSPECIFIED CHRONICITY, UNSPECIFIED SITE: ICD-10-CM

## 2024-05-16 DIAGNOSIS — D64.9 ANEMIA, UNSPECIFIED TYPE: ICD-10-CM

## 2024-05-16 PROCEDURE — 3078F DIAST BP <80 MM HG: CPT | Performed by: NURSE PRACTITIONER

## 2024-05-16 PROCEDURE — 1160F RVW MEDS BY RX/DR IN RCRD: CPT | Performed by: NURSE PRACTITIONER

## 2024-05-16 PROCEDURE — 1159F MED LIST DOCD IN RCRD: CPT | Performed by: NURSE PRACTITIONER

## 2024-05-16 PROCEDURE — 1125F AMNT PAIN NOTED PAIN PRSNT: CPT | Performed by: NURSE PRACTITIONER

## 2024-05-16 PROCEDURE — 3075F SYST BP GE 130 - 139MM HG: CPT | Performed by: NURSE PRACTITIONER

## 2024-05-16 PROCEDURE — 99214 OFFICE O/P EST MOD 30 MIN: CPT | Performed by: NURSE PRACTITIONER

## 2024-05-16 NOTE — PROGRESS NOTES
Chief Complaint  Gout, Anxiety, Hyperlipidemia, Anemia, Hypertension, Heartburn, Hypothyroidism, and Asthma, Tapia's esophagus    Subjective            Asaf Chilel presents to [facility]  History of Present Illness  Pt is a f/u for gout, anxiety, HLD, anemia, HTN, Gerd, hypothyroidism, asthma and Tapia's esophagus. No issues or concerns at this time.     Pt is due labs/orders placed.    PT is due EGD/orders placed.    PT is due RSV vaccine. Pt declines and understands the risks of not having.     MAW scheduled 8/26/24        Past Medical History:   Diagnosis Date    Anxiety     Arthritis 20 years    Asthma 5 years    Cancer     Cardiomyopathy     Cataract 5years    CHF (congestive heart failure) 2015    Surgery    COPD (chronic obstructive pulmonary disease)     Coronary artery disease     Diverticulosis 20 years    Eating disorder     Erectile dysfunction 5 years    Gastroesophageal reflux     Gout     Headache     High cholesterol     Hip pain     Hoarseness, chronic     Hypertension     Hypothyroid     AJ (iron deficiency anemia) 05/07/2015    Inflammatory bowel disease     Limb pain     Lumbago 09/03/2013    MRI shows abnormal appearance of bone marrow. Workup negative pet hem/onc    Myocardial infarction     Pancreatitis     Peripheral vascular disease 03/28/2015    Pneumonia     Sciatica 09/03/2013    Scoliosis     Tremor     Visual impairment 2015    Surgery       No Known Allergies     Past Surgical History:   Procedure Laterality Date    CARDIAC SURGERY  2015    COLONOSCOPY  2014    normal Dr. Bingham    COLONOSCOPY N/A 04/27/2022    Procedure: COLONOSCOPY WITH POLYPECTOMY;  Surgeon: Juarez Seo MD;  Location: [facility];  Service: General;  Laterality: N/A;  DIVERTICULOSIS, COLON POLYP    CORONARY ARTERY BYPASS GRAFT      CORONARY STENT PLACEMENT  2015    ENDOSCOPY N/A 04/27/2022    Procedure: ESOPHAGOGASTRODUODENOSCOPY WITH BIOPSIES AND POLYPECTOMY;   Surgeon: Juarez Seo MD;  Location: [facility] ENDOSCOPY;  Service: General;  Laterality: N/A;  GASTRIC POLYPS    EYE SURGERY Right     cancer removed x 2    JOINT REPLACEMENT      LYMPH NODE BIOPSY      MANDIBLE SURGERY      cancer removed    SHOULDER SURGERY Right         Social History     Tobacco Use    Smoking status: Former     Current packs/day: 0.00     Average packs/day: 1 pack/day for 44.0 years (44.0 ttl pk-yrs)     Types: Cigarettes     Start date: 1956     Quit date: 2000     Years since quittin.3    Smokeless tobacco: Never   Substance Use Topics    Alcohol use: Not Currently     Comment: Quit        Family History   Problem Relation Age of Onset    Cancer Mother     Heart disease Mother     Hyperlipidemia Mother     Hypertension Mother     Cancer Father         Lung    Arthritis Father     Cancer Brother         Throat    Cancer Other     Heart failure Other     Other Other         spine problems     Cancer Brother         Brain    Cancer Brother         Neck    Thyroid disease Brother     Malig Hyperthermia Neg Hx         Current Outpatient Medications on File Prior to Visit   Medication Sig    albuterol sulfate  (90 Base) MCG/ACT inhaler Inhale 1 puff Every 4 (Four) Hours As Needed for Shortness of Air.    allopurinol (ZYLOPRIM) 300 MG tablet TAKE 1 TABLET BY MOUTH EVERY DAY    amitriptyline (ELAVIL) 25 MG tablet TAKE 1 TABLET BY MOUTH EVERYDAY AT BEDTIME    aspirin 81 MG EC tablet Take 1 tablet by mouth Daily. Last dose 22    atorvastatin (LIPITOR) 80 MG tablet TAKE 1 TABLET BY MOUTH EVERY DAY    busPIRone (BUSPAR) 10 MG tablet Take 1 tablet by mouth 3 (Three) Times a Day.    clotrimazole-betamethasone (LOTRISONE) 1-0.05 % cream APPLY TOPICALLY TO THE APPROPRIATE AREA TWICE A DAY AS DIRECTED    ferrous sulfate 325 (65 FE) MG tablet TAKE 1 TABLET BY MOUTH EVERY DAY WITH BREAKFAST    ketoconazole (NIZORAL) 2 % shampoo Apply 1 application topically to the  "appropriate area as directed 1 (One) Time Per Week.    lisinopril (PRINIVIL,ZESTRIL) 40 MG tablet TAKE 1 TABLET BY MOUTH EVERY DAY    meclizine (ANTIVERT) 25 MG tablet TAKE 1 TABLET BY MOUTH THREE TIMES A DAY AS NEEDED FOR NAUSEA    multivitamin with minerals tablet tablet Take 1 tablet by mouth Daily.    omeprazole (priLOSEC) 20 MG capsule TAKE 2 CAPSULES BY MOUTH EVERY DAY    propranolol (INDERAL) 40 MG tablet TAKE 1 TABLET BY MOUTH THREE TIMES A DAY    spironolactone (ALDACTONE) 25 MG tablet Take 1 tablet daily except  on Sundays take 0.5 tablet    Synthroid 88 MCG tablet Take 1 tablet by mouth.    Trelegy Ellipta 100-62.5-25 MCG/ACT inhaler INHALE 1 PUFF DAILY. INDICATIONS: 2 BOX, FIRST BOX LOT: [id number] EXP: 3/2024, 2ND BOX LOT: [id number] EXP: 6/2024    Vitamins A & D (magic barrier cream) Apply 1 application  topically to the appropriate area as directed 2 (Two) Times a Day As Needed (rash).    [DISCONTINUED] icosapent ethyl (Vascepa) 1 g capsule capsule Take 2 g by mouth 2 (Two) Times a Day With Meals. (Patient not taking: Reported on 5/16/2024)     No current facility-administered medications on file prior to visit.       Health Maintenance Due   Topic Date Due    GASTROSCOPY (EGD)  04/27/2024    LIPID PANEL  05/15/2024       Objective     /58   Pulse 58   Ht 182.9 cm (72\")   Wt 98.4 kg (217 lb)   SpO2 98%   BMI 29.43 kg/m²       Physical Exam  Constitutional:       General: He is not in acute distress.     Appearance: Normal appearance. He is not ill-appearing.   HENT:      Head: Normocephalic and atraumatic.      Right Ear: Tympanic membrane, ear canal and external ear normal.      Left Ear: Tympanic membrane, ear canal and external ear normal.      Nose: Nose normal.   Cardiovascular:      Rate and Rhythm: Normal rate and regular rhythm.      Heart sounds: Normal heart sounds. No murmur heard.  Pulmonary:      Effort: Pulmonary effort is normal. No respiratory distress.      Breath sounds: Normal breath " sounds.   Chest:      Chest wall: No tenderness.   Abdominal:      General: Abdomen is flat. Bowel sounds are normal. There is no distension.      Palpations: Abdomen is soft. There is no mass.      Tenderness: There is no abdominal tenderness. There is no guarding.   Musculoskeletal:         General: No swelling or tenderness. Normal range of motion.      Cervical back: Normal range of motion and neck supple.   Skin:     General: Skin is warm and dry.      Findings: No rash.   Neurological:      General: No focal deficit present.      Mental Status: He is alert and oriented to person, place, and time. Mental status is at baseline.      Gait: Gait normal.   Psychiatric:         Attention and Perception: Attention normal.         Mood and Affect: Mood and affect normal.         Speech: Speech normal.         Behavior: Behavior normal. Behavior is cooperative.         Thought Content: Thought content normal. Thought content does not include suicidal ideation.         Judgment: Judgment normal.           Result Review :                           Assessment and Plan        Diagnoses and all orders for this visit:    1. Hyperlipidemia, unspecified hyperlipidemia type (Primary)  Comments:  stable on Lipitor 80mg, continue  Orders:  -     Comprehensive metabolic panel; Future  -     Lipid panel; Future    2. Gout, unspecified cause, unspecified chronicity, unspecified site  Comments:  stable on Allopurinaol 300mg, continue  Orders:  -     Uric acid; Future    3. Anxiety  Comments:  stable on Buspar 10mg, continue    4. Anemia, unspecified type  Comments:  stable on ferrous sulfate 325mg, continue  Orders:  -     CBC w AUTO Differential; Future  -     Iron Profile; Future    5. Primary hypertension  Comments:  stable on Lisinopril 40mg, continue, continue f/u  with Cardiology for mgmt    6. Hypothyroidism, unspecified type  Comments:  stable on synthroid 88mcg, continue  Orders:  -     TSH; Future    7. Mild intermittent  asthma with acute exacerbation  Comments:  stable on Trelegy, continue    8. Tapia's esophagus without dysplasia  -     Ambulatory referral for Screening EGD              Follow Up     No follow-ups on file.    Patient was given instructions and counseling regarding his condition or for health maintenance advice. Please see specific information pulled into the AVS if appropriate.     Asaf Gilmore Ranjana  reports that he quit smoking about 24 years ago. His smoking use included cigarettes. He started smoking about 68 years ago. He has a 44 pack-year smoking history. He has never used smokeless tobacco.

## 2024-05-21 PROBLEM — R79.89 ELEVATED TROPONIN LEVEL NOT DUE MYOCARDIAL INFARCTION: Status: ACTIVE | Noted: 2024-01-01

## 2024-05-21 PROBLEM — J96.01 ACUTE HYPOXIC RESPIRATORY FAILURE: Status: ACTIVE | Noted: 2024-01-01

## 2024-05-21 NOTE — PROCEDURES
Procedure Name: Nassar Catheter Placement    Indication:  Urinary retention    Procedure:  Patient was prepped and draped in normal fashion for catheter placement.      A 21 Citizen of [place] nassar catheter was inserted per urethra into the bladder without difficulty.  There was return of 500mL urine.      Patient tolerated procedure well.      Complications: None    Electronically signed by EVENS Heller, 05/21/24, 6:21 PM EDT.

## 2024-05-21 NOTE — PROCEDURES
Central line placement note     Indication: Septic shock and respiratory failure     Consent obtained: From patient     Timeout performed     Procedure: The patient was placed in the supine position and the skin over the patient's right subclavian vein was prepped with chlorhexidine. The patient was draped with full body draped sterile procedure was used.  The skin was infiltrated with local anesthesia with over the insertion site 5 mL of 1% lidocaine.  Large-bore needle was used to cannulate the vessel with return of dark blood.  The guidewire was inserted into the needle using the Seldinger technique. A triple lumen catheter was placed into the vessel.  All 3 ports freely flush and draw.  The catheter was securely fastened to the skin.  Next a sterile dressing was placed and a x-ray confirmed positioning of the line.     The patient tolerated the procedure well     Complications: None    Electronically signed by Zac Keith MD, 05/21/24, 4:53 PM EDT.

## 2024-05-21 NOTE — CASE MANAGEMENT/SOCIAL WORK
Discharge Planning Assessment   Alek     Patient Name: Asaf Chilel  MRN: [id number]  Today's Date: 5/21/2024    Admit Date: 5/21/2024        Discharge Needs Assessment       Row Name 05/21/24 1129       Living Environment    People in Home spouse    Name(s) of People in Home Lives with wife, Mary    Current Living Arrangements home    Potentially Unsafe Housing Conditions none    In the past 12 months has the electric, gas, oil, or water company threatened to shut off services in your home? No    Primary Care Provided by self    Provides Primary Care For no one    Family Caregiver if Needed child(rosa), adult;spouse;other relative(s)    Family Caregiver Names Wife, Mary; Son, Arash and daughter in [place]    Quality of Family Relationships helpful;involved;supportive    Able to Return to Prior Arrangements yes       Resource/Environmental Concerns    Resource/Environmental Concerns none    Transportation Concerns none       Transportation Needs    In the past 12 months, has lack of transportation kept you from medical appointments or from getting medications? no    In the past 12 months, has lack of transportation kept you from meetings, work, or from getting things needed for daily living? No       Food Insecurity    Within the past 12 months, you worried that your food would run out before you got the money to buy more. Never true    Within the past 12 months, the food you bought just didn't last and you didn't have money to get more. Never true       Transition Planning    Patient/Family Anticipates Transition to home with family    Patient/Family Anticipated Services at Transition none    Transportation Anticipated family or friend will provide;health plan transportation       Discharge Needs Assessment    Readmission Within the Last 30 Days no previous admission in last 30 days    Current Outpatient/Agency/Support Group other (see comments)    Equipment Currently Used at Home none     Concerns to be Addressed no discharge needs identified    Anticipated Changes Related to Illness none    Equipment Needed After Discharge none                   Discharge Plan    No documentation.                 Continued Care and Services - Admitted Since 5/21/2024    No active coordination exists for this encounter.          Demographic Summary       Row Name 05/21/24 1124       General Information    Admission Type inpatient    Arrived From home    Referral Source emergency department    Reason for Consult discharge planning    Preferred Language English       Contact Information    Permission Granted to Share Info With ;, insurance    Contact Information Obtained for ;, insurance                   Functional Status       Row Name 05/21/24 1127       Functional Status    Usual Activity Tolerance good    Current Activity Tolerance fair       Physical Activity    On average, how many days per week do you engage in moderate to strenuous exercise (like a brisk walk)? 0 days    On average, how many minutes do you engage in exercise at this level? 0 min    Number of minutes of exercise per week 0       Assessment of Health Literacy    How often do you have someone help you read hospital materials? Never    How often do you have problems learning about your medical condition because of difficulty understanding written information? Never    How often do you have a problem understanding what is told to you about your medical condition? Never    How confident are you filling out medical forms by yourself? Quite a bit    Health Literacy Good       Functional Status, IADL    Medications independent    Meal Preparation independent    Housekeeping independent    Laundry independent    Shopping independent       Mental Status    General Appearance WDL WDL       Mental Status Summary    Recent Changes in Mental Status/Cognitive Functioning no changes       Employment/     Employment Status , previous service    Current or Previous Occupation            Current or Previous  Service active duty, past     Service Experiences experienced combat    Active Duty Status active duty     [employer]                   Psychosocial    No documentation.                  Abuse/Neglect       Row Name 05/21/24 1128       Personal Safety    Feels Unsafe at Home or Work/School unable to answer (comment required)    Feels Threatened by Someone unable to answer (comment required)    Does Anyone Try to Keep You From Having Contact with Others or Doing Things Outside Your Home? unable to answer (comment required)    Physical Signs of Abuse Present patient unable to answer                   Legal       Row Name 05/21/24 1128       Financial Resource Strain    How hard is it for you to pay for the very basics like food, housing, medical care, and heating? Pt Unable       Financial/Legal    Source of Income pension/[facility]    Application for Public Assistance not applied       Legal    Criminal Activity/Legal Involvement none                   Substance Abuse       Row Name 05/21/24 1129       Substance Use    Substance Use Status never used       Family Member Substance Use (#4)    Family History of Substance Use none    Previous Substance Use Treatment none                   Patient Forms    No documentation.                 SW met with wife, Mary and son, Vilma at bedside as patient was placed bi-pap with plan to transition to airvo. Family answered most questions in assessment. Patient lives at home with wife. Pt's son vilma and daughter in law Taniya Loe is also a good support system for him. Pt normally able to complete ADL's on his own. Pt also sees heart specialist, skin specialist and received Cortizone shots for his left shoulder with the recent being a couple weeks ago. Pt does not currently have any medical equipment at home that he  utilizes. 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 Pt sees Naheed Mane for his PCP and uses [facility]. No current needs at this time.    THONY Peres

## 2024-05-21 NOTE — CONSULTS
Cardiology Consult Note  [facility] EMERGENCY ROOM          Patient Identification:  Asaf Chilel      [id number]  78 y.o.        male  1945           Reason for Consultation: Shortness of breath and abnormal EKG    PCP: Naheed Mane APRN    History of Present Illness:     Patient is a 78-year-old with a prior history of peripheral vascular disease, essential hypertension, systolic/diastolic congestive heart failure last ejection fraction low normal, dyslipidemia who presented to the emergency room with shortness of breath and altered mental status.  Patient was found to be hypoxic in respiratory distress and tThe patient does report some abdominal pain and apparently had an episode of some vomiting and possible aspiration emergency room as well.achycardic.  CT scan chest revealed evidence of bilateral infiltrates consistent with multifocal pneumonia.  He denies any chest discomfort    Past History:  Past Medical History:   Diagnosis Date    Anxiety     Arthritis 20 years    Asthma 5 years    Cancer     Cardiomyopathy     Cataract 5years    CHF (congestive heart failure) 2015    Surgery    COPD (chronic obstructive pulmonary disease)     Coronary artery disease     Diverticulosis 20 years    Eating disorder     Erectile dysfunction 5 years    Gastroesophageal reflux     Gout     Headache     High cholesterol     Hip pain     Hoarseness, chronic     Hypertension     Hypothyroid     AJ (iron deficiency anemia) 05/07/2015    Inflammatory bowel disease     Limb pain     Lumbago 09/03/2013    MRI shows abnormal appearance of bone marrow. Workup negative pet hem/onc    Myocardial infarction     Pancreatitis     Peripheral vascular disease 03/28/2015    Pneumonia     Sciatica 09/03/2013    Scoliosis     Tremor     Visual impairment 2015    Surgery     Past Surgical History:   Procedure Laterality Date    CARDIAC SURGERY  2015    COLONOSCOPY  2014    normal Dr. Bingham    COLONOSCOPY N/A 04/27/2022     Procedure: COLONOSCOPY WITH POLYPECTOMY;  Surgeon: Juarez Seo MD;  Location: [facility] ENDOSCOPY;  Service: General;  Laterality: N/A;  DIVERTICULOSIS, COLON POLYP    CORONARY ARTERY BYPASS GRAFT      CORONARY STENT PLACEMENT      ENDOSCOPY N/A 2022    Procedure: ESOPHAGOGASTRODUODENOSCOPY WITH BIOPSIES AND POLYPECTOMY;  Surgeon: Juarez Seo MD;  Location: [facility] ENDOSCOPY;  Service: General;  Laterality: N/A;  GASTRIC POLYPS    EYE SURGERY Right     cancer removed x 2    JOINT REPLACEMENT      LYMPH NODE BIOPSY      MANDIBLE SURGERY      cancer removed    SHOULDER SURGERY Right      No Known Allergies  Social History     Socioeconomic History    Marital status:    Tobacco Use    Smoking status: Former     Current packs/day: 0.00     Average packs/day: 1 pack/day for 44.0 years (44.0 ttl pk-yrs)     Types: Cigarettes     Start date: 1956     Quit date: 2000     Years since quittin.4    Smokeless tobacco: Never   Vaping Use    Vaping status: Never Used   Substance and Sexual Activity    Alcohol use: Not Currently     Comment: Quit     Drug use: Never    Sexual activity: Not Currently     Partners: Female     Birth control/protection: Pill, Surgical     Comment: O     Family History   Problem Relation Age of Onset    Cancer Mother     Heart disease Mother     Hyperlipidemia Mother     Hypertension Mother     Cancer Father         Lung    Arthritis Father     Cancer Brother         Throat    Cancer Other     Heart failure Other     Other Other         spine problems     Cancer Brother         Brain    Cancer Brother         Neck    Thyroid disease Brother     Malig Hyperthermia Neg Hx        Medications:  Prior to Admission medications    Medication Sig Start Date End Date Taking? Authorizing Provider   albuterol sulfate  (90 Base) MCG/ACT inhaler Inhale 1 puff Every 4 (Four) Hours As Needed for Shortness of Air. 22  Yes Concetta Carson APRN   allopurinol  (ZYLOPRIM) 300 MG tablet TAKE 1 TABLET BY MOUTH EVERY DAY  Patient taking differently: Take 1 tablet by mouth Daily. 4/12/24  Yes Naheed Mane APRN   amitriptyline (ELAVIL) 25 MG tablet TAKE 1 TABLET BY MOUTH EVERYDAY AT BEDTIME  Patient taking differently: Take 1 tablet by mouth Every Night. 4/12/24  Yes Naheed Mane APRN   aspirin 81 MG EC tablet Take 1 tablet by mouth Daily. Last dose Friday 4/22/22   Yes Jona Gibson MD   atorvastatin (LIPITOR) 80 MG tablet TAKE 1 TABLET BY MOUTH EVERY DAY  Patient taking differently: Take 1 tablet by mouth Daily. 3/14/24  Yes Naheed Mane APRN   busPIRone (BUSPAR) 10 MG tablet Take 1 tablet by mouth 3 (Three) Times a Day. 2/29/24  Yes Naheed Mane APRN   ferrous sulfate 325 (65 FE) MG tablet TAKE 1 TABLET BY MOUTH EVERY DAY WITH BREAKFAST 3/7/24   Naheed Mane APRN   ketoconazole (NIZORAL) 2 % shampoo Apply 1 application topically to the appropriate area as directed 1 (One) Time Per Week. 5/13/21   Jona Gibson MD   lisinopril (PRINIVIL,ZESTRIL) 40 MG tablet TAKE 1 TABLET BY MOUTH EVERY DAY 12/8/23   Naheed Mane APRN   meclizine (ANTIVERT) 25 MG tablet TAKE 1 TABLET BY MOUTH THREE TIMES A DAY AS NEEDED FOR NAUSEA 4/5/24   Naheed Mane APRN   multivitamin with minerals tablet tablet Take 1 tablet by mouth Daily.    Jona Gibson MD   omeprazole (priLOSEC) 20 MG capsule TAKE 2 CAPSULES BY MOUTH EVERY DAY 3/7/24   Naheed Mane APRN   propranolol (INDERAL) 40 MG tablet TAKE 1 TABLET BY MOUTH THREE TIMES A DAY 3/14/24   Naheed Mane APRN   spironolactone (ALDACTONE) 25 MG tablet Take 1 tablet daily except  on Sundays take 0.5 tablet 8/8/23   Patty Roberts APRN   Synthroid 88 MCG tablet Take 1 tablet by mouth. 5/27/21   Jona Gibson MD Trelegy Ellippeter 100-62.5-25 MCG/ACT inhaler INHALE 1 PUFF DAILY. INDICATIONS: 2 BOX, FIRST BOX LOT: [id number] EXP: 3/2024, 2ND BOX LOT: [id number] EXP: 6/2024 3/14/24   Naheed Mane, APRN  "  Vitamins A & D (magic barrier cream) Apply 1 application  topically to the appropriate area as directed 2 (Two) Times a Day As Needed (rash). 9/28/23   Naheed Mane APRN   clotrimazole-betamethasone (LOTRISONE) 1-0.05 % cream APPLY TOPICALLY TO THE APPROPRIATE AREA TWICE A DAY AS DIRECTED 6/5/23 5/21/24  Naheed Mane APRN      Current medications:  arformoterol, 15 mcg, Nebulization, BID - RT  budesonide, 0.5 mg, Nebulization, BID - RT  cefepime, 2,000 mg, Intravenous, Q12H  heparin (porcine), 5,000 Units, Subcutaneous, Q8H  ipratropium-albuterol, 3 mL, Nebulization, 4x Daily - RT  sodium chloride, 10 mL, Intravenous, Q12H  vancomycin, 1,000 mg, Intravenous, Q24H  vancomycin, 20 mg/kg, Intravenous, Once      Current IV drips:  amiodarone, 0.5 mg/min, Last Rate: 0.5 mg/min (05/21/24 0798)  norepinephrine, 0.02-0.3 mcg/kg/min, Last Rate: 0.2 mcg/kg/min (05/21/24 3330)  Pharmacy to Dose Cefepime,   Pharmacy to dose vancomycin,         Review of Systems   Constitutional: Negative for chills, fever and weight loss.   HENT:  Negative for congestion and nosebleeds.    Cardiovascular:  Negative for orthopnea and paroxysmal nocturnal dyspnea.   Respiratory:  Positive for shortness of breath. Negative for cough.    Endocrine: Negative for cold intolerance and heat intolerance.   Skin:  Negative for rash.   Musculoskeletal:  Negative for back pain and muscle weakness.   Gastrointestinal:  Negative for abdominal pain, nausea and vomiting.   Genitourinary:  Negative for dysuria and nocturia.   Neurological:  Negative for dizziness and light-headedness.   Psychiatric/Behavioral:  Positive for altered mental status. Negative for hallucinations.          Physical Exam    /93   Pulse (!) 125   Resp (!) 39   Ht 182.9 cm (72\")   Wt 94.2 kg (207 lb 10.8 oz)   SpO2 94%   BMI 28.17 kg/m²  Body mass index is 28.17 kg/m².   Oxygen saturation   @FLOWAN(10::1)@ SpO2  Min: 84 %  Max: 95 %    General Appearance:   no acute " distress  Alert and oriented x3  HENT:   lips not cyanotic  Atraumatic  Neck:  thyroid not enlarged  supple  Respiratory:  no respiratory distress  Bilateral rhonchi  no rales  Cardiovascular:  no jugular venous distention  regular rhythm  apical impulse normal  S1 normal, S2 normal  no S3, no S4   no murmur  no rub, no thrill  no carotid bruit  pedal pulses normal  lower extremity edema: Mild  Gastrointestinal:   bowel sounds normal  non-tender  no hepatomegaly, no splenomegaly  Musculoskeletal:  no clubbing of fingers.   normocephalic, head atraumatic  Skin:   warm, dry  No rashes  Neuro/Psychiatric:  normal mood and affect  judgement and insight appropriate      Cardiographics:     ECG  (personally reviewed)                  Telemetry:  (personally reviewed) normal sinus rhythm and sinus tachycardia          No results found for this or any previous visit.     Cardiolite (Tc-99m Sestamibi) stress test                  Lab Review:       CBC          7/5/2023    11:43 11/20/2023    12:06 5/21/2024    05:48   CBC   WBC 8.82  9.21  8.20    RBC 4.58  4.45  5.70    Hemoglobin 14.0  13.6  16.9    Hematocrit 41.3  39.2  52.1    MCV 90.2  88.1  91.4    MCH 30.6  30.6  29.6    MCHC 33.9  34.7  32.4    RDW 13.7  13.2  15.7    Platelets 164  239  228        CMP          7/5/2023    11:43 11/20/2023    12:06 5/21/2024    05:32 5/21/2024    05:48   CMP   Glucose 97  101  130  116    BUN 22  17   27    Creatinine 1.22  1.19   1.74    EGFR 61.1  62.9   39.9    Sodium 139  138  142.1  141    Potassium 4.4  4.3   4.0    Chloride 104  102   104    Calcium 9.2  9.6   9.8    Total Protein 7.3  7.0   6.7    Albumin 4.5  4.3   4.1    Globulin 2.8  2.7   2.6    Total Bilirubin 0.6  0.6   1.0    Alkaline Phosphatase 78  75   71    AST (SGOT) 19  18   38    ALT (SGPT) 14  15   33    Albumin/Globulin Ratio 1.6  1.6   1.6    BUN/Creatinine Ratio 18.0  14.3   15.5    Anion Gap 11.5  12.5   14.2         CARDIAC LABS:      Lab 05/21/24  0548  "  PROBNP 428.2   HSTROP T 47*      No results found for: \"DIGOXIN\"   Lab Results   Component Value Date    TSH 1.420 11/20/2023           Invalid input(s): \"LDLCALC\"  No results found for: \"POCTROP\"  No results found for: \"DDIMERQUAN\"  Lab Results   Component Value Date    MG 1.6 12/19/2022             CARDIAC LABS:      Lab 05/21/24  0548   PROBNP 428.2   HSTROP T 47*        Imaging:  CXR  Narrative & Impression   XR CHEST 1 VW-     Date of Exam: 5/21/2024 5:40 AM     Indication: SOA/SOB/shortness of air/shortness of breath.     Comparison: 4/29/2020.      FINDINGS:  A single AP semiupright portable chest radiograph reveals low lung  volumes and bilateral atelectasis and/or infiltrate(s). The findings may  be related to atelectasis alone. Pulmonary edema is possible. Pneumonia  cannot be excluded. Probably no cardiac enlargement. No pneumothorax.  External artifacts obscure detail. No definite sizable pleural effusion  is suggested. The thoracic aorta is atherosclerotic. Degenerative  changes involve the imaged spine and the bilateral shoulders.     IMPRESSION:  Pulmonary hypoinflation is seen with new bilateral airspace opacities,  which may represent atelectasis alone (such as related to the low lung  volumes). Pulmonary edema or pneumonia cannot be excluded. Probably no  cardiac enlargement. Consider close interval clinical and imaging  follow-up to ensure a benign progression.              Assessment:    Acute hypoxic respiratory failure    Elevated troponin level not due myocardial infarction      Elevated troponin levels with some mild ST segment depressions likely type II event related to hypoxia and tachycardia patient without any ongoing chest discomfort issues Conservative medical management and checking echocardiogram.  Can continue patient's home aspirin 81 mg once a day and when blood pressure improves would resume beta-blocker as well currently requiring pressor support for hypotension " issues    Plan:  1.  Aspir 81 milligrams daily  2.  Echocardiogram  3.  When blood pressure issues are improved would recommend resuming beta-blocker      Thank you for allowing us to share in [facility].            Eduardo Persaud MD   5/21/2024    08:21 EDT

## 2024-05-21 NOTE — PROGRESS NOTES
Respiratory Therapist Broncho-Pulmonary Hygiene Progress Note      Patient Name:  Asaf Chilel  YOB: 1945    Asaf Chilel meets the qualification for Level 3 of the Bronco-Pulmonary Hygiene Protocol. This was based on my daily patient assessment and includes review of chest x-ray results, cough ability and quality, oxygenation, secretions or risk for secretion development and patient mobility.     Broncho-Pulmonary Hygiene Assessment:    Level of Movement: Actively changing positions-requires assistance  Disoriented/Follows Commands    Breath Sounds: Diminished and/or coarse rhonchi    Cough: Strong, effective    Chest X-Ray: Mild consolidation and/or atelectasis.    Sputum Productions: None or small amount of thin or watery secretions with effective cough    History and Physical: Chronic condition    SpO2 to Oxygen Need: greater than 90% on  greater than 6L, Vapotherm, oxygen mask greater than 40% or ventilator    Current SpO2 is: 93% on BIPAP 70%    Based on this information, I have completed the following interventions: CPT (precussor)      Electronically signed by Frances Ferro RRT, 05/21/24, 4:40 PM EDT.

## 2024-05-21 NOTE — H&P
[facility]  Date: 2024   Patient Name: Asaf Chilel  : 1945  MRN: [id number]  Primary Care Physician:  Naheed Mane APRN  Date of admission: 2024    Subjective   Subjective     Chief Complaint: Altered mentation, shortness of breath    HPI:    Asaf Chilel is a 78 y.o. male with past medical history of systolic and diastolic heart failure, hypertension, hyperlipidemia, hyperlipidemia, hypothyroidism, GERD and anxiety who presented with complaints of shortness of breath and altered mentation.  Patient was in his usual state of health until earlier this morning around 1 AM when he suddenly woke up with shortness of breath.  He also vomited as per the wife.  Patient is altered and history was obtained from his wife at bedside.  He had no symptoms prior to this morning and suddenly woke up gasping for air as per the wife.  He was confused and not his usual state of his mentation.  He was normal on 12 last night when he went to bed.  Wife denied any fever, cough, recent exposure to sick people or any other symptoms prior to this morning.  Compliant with his medications.    On presentation, patient was hypoxic to 84% in room air and was placed on nonrebreather mask.  Later was switched to BiPAP due to increased work of breathing.  Patient was tachycardic for which she received 1 dose of Cardizem.  Persistently hypotensive for which, amiodarone 150 mg IV once was given.  Patient was also started on Levophed for persistent hypotension.  Patient is able to tell his name but not oriented and confused during my evaluation.  Follows commands.  BiPAP was removed for some time but patient desaturated to 83% with increased work of breathing for which he was placed back on BiPAP.    Lab work showed CBC with WBC 8.2, hemoglobin 16.9 and platelets 228.  Chemistry showed sodium 141, potassium 4, bicarb 22.8, BUN 27 and JOEL with creatinine 1.74.  Glucose 120  with repeat glucose under 60.  LFT was nonsignificant.  ABG was done which showed pH of 7.4, pCO2 28, pO2 55.59 with SpO2 arterial 89%.  Lactic acid 4.6 on presentation.  EKG showed possible sinus tachycardia with right bundle branch block.  Troponin 47 with proBNP 428.2.  CT head showed no acute brain abnormality but did show age-indeterminate possible severe acute, superimposed upon chronic, sinus disease.  CT chest with contrast showed no PE but did show bilateral pulmonary consolidation predominantly in the lower lobes, likely infectious multifocal pneumonia; filling defect within the trachea in the right mainstem bronchus likely representing mucous.  Patient is being admitted to critical care unit with working diagnosis of acute hypoxic respiratory failure and septic shock likely in the setting of multifocal pneumonia.  Intensivist notified.        Past Medical History:  Past Medical History:   Diagnosis Date    Anxiety     Arthritis 20 years    Asthma 5 years    Cancer     Cardiomyopathy     Cataract 5years    CHF (congestive heart failure) 2015    Surgery    COPD (chronic obstructive pulmonary disease)     Coronary artery disease     Diverticulosis 20 years    Eating disorder     Erectile dysfunction 5 years    Gastroesophageal reflux     Gout     Headache     High cholesterol     Hip pain     Hoarseness, chronic     Hypertension     Hypothyroid     AJ (iron deficiency anemia) 05/07/2015    Inflammatory bowel disease     Limb pain     Lumbago 09/03/2013    MRI shows abnormal appearance of bone marrow. Workup negative pet hem/onc    Myocardial infarction     Pancreatitis     Peripheral vascular disease 03/28/2015    Pneumonia     Sciatica 09/03/2013    Scoliosis     Tremor     Visual impairment 2015    Surgery       Past Surgical History:  Past Surgical History:   Procedure Laterality Date    CARDIAC SURGERY  2015    COLONOSCOPY  2014    normal Dr. Bingham    COLONOSCOPY N/A 04/27/2022    Procedure: COLONOSCOPY  WITH POLYPECTOMY;  Surgeon: Juarez Seo MD;  Location: [facility] ENDOSCOPY;  Service: General;  Laterality: N/A;  DIVERTICULOSIS, COLON POLYP    CORONARY ARTERY BYPASS GRAFT      CORONARY STENT PLACEMENT      ENDOSCOPY N/A 2022    Procedure: ESOPHAGOGASTRODUODENOSCOPY WITH BIOPSIES AND POLYPECTOMY;  Surgeon: Juarez Seo MD;  Location: [facility] ENDOSCOPY;  Service: General;  Laterality: N/A;  GASTRIC POLYPS    EYE SURGERY Right     cancer removed x 2    JOINT REPLACEMENT      LYMPH NODE BIOPSY      MANDIBLE SURGERY      cancer removed    SHOULDER SURGERY Right        Family History:       Social History:   Social History     Socioeconomic History    Marital status:    Tobacco Use    Smoking status: Former     Current packs/day: 0.00     Average packs/day: 1 pack/day for 44.0 years (44.0 ttl pk-yrs)     Types: Cigarettes     Start date: 1956     Quit date: 2000     Years since quittin.4    Smokeless tobacco: Never   Vaping Use    Vaping status: Never Used   Substance and Sexual Activity    Alcohol use: Not Currently     Comment: Quit     Drug use: Never    Sexual activity: Not Currently     Partners: Female     Birth control/protection: Pill, Surgical     Comment: O       Home Medications:  Fluticasone-Umeclidin-Vilant, albuterol sulfate HFA, allopurinol, amitriptyline, aspirin, atorvastatin, busPIRone, clotrimazole-betamethasone, ferrous sulfate, ketoconazole, levothyroxine, lisinopril, magic barrier cream, meclizine, multivitamin with minerals, omeprazole, propranolol, and spironolactone    Allergies:  No Known Allergies    Review of Systems   All systems were reviewed and negative except for: Shortness of breath, altered mentation    Objective   Objective     Vitals:   Heart Rate:  [] 118  Resp:  [39-47] 39  BP: ()/(55-92) 99/57  Flow (L/min):  [15] 15    Physical Exam    Constitutional: Awake, alert, confused.   Respiratory: Bilateral decreased air entry, mild  crackles on bases, no wheeze appreciated.  On BiPAP.   Cardiovascular: Tachycardic, no murmurs.   Gastrointestinal: soft, nontender, slightly distended   Musculoskeletal: No bilateral ankle edema   Neurologic: Oriented x 1 (self), moving all extremities spontaneously, follows commands.  Limited given patient is on BiPAP.       Result Review    Result Review:  I have personally reviewed the results from the time of this admission to 5/21/2024 07:46 EDT and agree with these findings:  []  Laboratory  []  Microbiology  []  Radiology  []  EKG/Telemetry   []  Cardiology/Vascular   []  Pathology  []  Old records  []  Other:      Assessment & Plan   Assessment / Plan     Assessment/Plan:   Septic shock, likely in the setting of multifocal pneumonia  Acute hypoxic respiratory failure, likely in the setting of multifocal pneumonia  Infectious multifocal pneumonia versus aspiration pneumonia, unknown organism  Lactic acidosis  Elevated troponin  A-fib with RVR  History of systolic and diastolic heart failure  History of hyperlipidemia  History of hypertension  Hypothyroidism  GERD  Anxiety    -Patient admitted to critical care unit.  Appreciate intensivist input.  -On BiPAP for increased work of breathing.  Pulmonology planning to intubate patient to perform bronchoscopy if his respiratory status worsens.  -Continue to wean BiPAP as tolerated to maintain SpO2 greater than 90%.  -On IV cefepime and IV vancomycin.  -Lactic acidosis, continue to trend and monitor.  -ProCalcitonin of 17.96.  -Respiratory panel positive for parainfluenza virus.  -Strep pneumo antigen positive in urine.  -On Levophed for septic shock, continue to titrate as tolerated to maintain MAP greater than 65.  -S/p LR bolus in the ED.  -Also found to have A-fib with RVR; started on amnio drip.  -Transthoracic echo ordered.  -Cardiology consulted, appreciate input.  -Started on Solu-Medrol 40 mg every 8 hours.  -Continue Brovana, Pulmicort.  -On  bronchopulmonary hygiene.  -Appreciate further input by intensivist and cardiologist.  -Rest of the management as per intensivist.    DVT prophylaxis:  Medical DVT prophylaxis orders are present.        CODE STATUS:    Level Of Support Discussed With: Next of Kin (If No Surrogate)  Code Status (Patient has no pulse and is not breathing): CPR (Attempt to Resuscitate)  Medical Interventions (Patient has pulse or is breathing): Full Support      Admission Status:  I believe this patient meets inpatient status.    Electronically signed by Raudel John MD, 05/21/24, 7:46 AM EDT.

## 2024-05-21 NOTE — CONSULTS
Pulmonary / Critical Care Consult Note      Patient Name: Asaf Chilel  : 1945  MRN: [id number]  Primary Care Physician:  Naheed Mane APRN  Referring Physician: Doretha Mann MD  Date of admission: 2024    Subjective   Subjective     Reason for Consult/ Chief Complaint:   Shortness of breath and confusion    HPI:  Asaf Chilel is a 78 y.o. male with a history of combined systolic and diastolic heart failure, anxiety, reportedly woke up abruptly short of breath.  Patient was completely fine yesterday and states he woke up at 1 AM suddenly short of breath and very confused.  His wife states that she woke up and he got out of bed gasping for air and then started vomiting.  Here in the emergency department he is marked increased work of breathing, has an elevated lactic acid, profoundly hypoxemic requiring BiPAP.  CT was done which shows concerning bibasilar aspiration into his lungs.  He also is in atrial fibrillation with RVR.  He has been started on norepinephrine, amiodarone drips as well as IV fluids.  He is on BiPAP but still has increased work of breathing but does feel slightly better.  He is being transferred to the ICU for further care.      Personal History     Past Medical History:   Diagnosis Date    Anxiety     Arthritis 20 years    Asthma 5 years    Cancer     Cardiomyopathy     Cataract 5years    CHF (congestive heart failure)     Surgery    COPD (chronic obstructive pulmonary disease)     Coronary artery disease     Diverticulosis 20 years    Eating disorder     Erectile dysfunction 5 years    Gastroesophageal reflux     Gout     Headache     High cholesterol     Hip pain     Hoarseness, chronic     Hypertension     Hypothyroid     AJ (iron deficiency anemia) 2015    Inflammatory bowel disease     Limb pain     Lumbago 2013    MRI shows abnormal appearance of bone marrow. Workup negative pet hem/onc    Myocardial infarction     Pancreatitis      Peripheral vascular disease 03/28/2015    Pneumonia     Sciatica 09/03/2013    Scoliosis     Tremor     Visual impairment 2015    Surgery       Past Surgical History:   Procedure Laterality Date    CARDIAC SURGERY  2015    COLONOSCOPY  2014    normal Dr. Bingham    COLONOSCOPY N/A 04/27/2022    Procedure: COLONOSCOPY WITH POLYPECTOMY;  Surgeon: Juarez Seo MD;  Location: [facility] ENDOSCOPY;  Service: General;  Laterality: N/A;  DIVERTICULOSIS, COLON POLYP    CORONARY ARTERY BYPASS GRAFT      CORONARY STENT PLACEMENT  2015    ENDOSCOPY N/A 04/27/2022    Procedure: ESOPHAGOGASTRODUODENOSCOPY WITH BIOPSIES AND POLYPECTOMY;  Surgeon: Juarez Seo MD;  Location: [facility] ENDOSCOPY;  Service: General;  Laterality: N/A;  GASTRIC POLYPS    EYE SURGERY Right     cancer removed x 2    JOINT REPLACEMENT      LYMPH NODE BIOPSY      MANDIBLE SURGERY      cancer removed    SHOULDER SURGERY Right        Family History: family history includes Arthritis in his father; Cancer in his brother, brother, brother, father, mother, and another family member; Heart disease in his mother; Heart failure in an other family member; Hyperlipidemia in his mother; Hypertension in his mother; Other in an other family member; Thyroid disease in his brother. Otherwise pertinent FHx was reviewed and not pertinent to current issue.    Social History:  reports that he quit smoking about 24 years ago. His smoking use included cigarettes. He started smoking about 68 years ago. He has a 44 pack-year smoking history. He has never used smokeless tobacco. He reports that he does not currently use alcohol. He reports that he does not use drugs.    Home Medications:  Fluticasone-Umeclidin-Vilant, albuterol sulfate HFA, allopurinol, amitriptyline, aspirin, atorvastatin, busPIRone, clindamycin, ferrous sulfate, icosapent ethyl, ketoconazole, levothyroxine, lisinopril, magic barrier cream, meclizine, multivitamin with minerals, omeprazole, propranolol, and  spironolactone    Allergies:  No Known Allergies    Objective    Objective     Vitals:   Heart Rate:  [] 119  Resp:  [32-47] 32  BP: ()/(42-93) 101/73  Flow (L/min):  [15] 15    Physical Exam:  Vital Signs Reviewed   General: Critically ill male on BiPAP  HEENT:  PERRL, EOMI.  OP, nares clear  Chest:  good aeration, coarse crackles and rhonchi bilaterally, tympanic to percussion bilaterally, increased work of breathing noted on BiPAP  CV: Tachycardic, irregularly irregular no MGR, pulses 2+, equal.  Abd:  Soft, NT, ND, + BS, no HSM  EXT:  no clubbing, no cyanosis, no edema, extremities cold  Neuro:  A&Ox3, CN grossly intact, no focal deficits.  Skin: No rashes or lesions noted      Result Review    Result Review:  I have personally reviewed the results from the time of this admission to 5/21/2024 12:19 EDT and agree with these findings:  [x]  Laboratory  [x]  Microbiology  [x]  Radiology  [x]  EKG/Telemetry   [x]  Cardiology/Vascular   []  Pathology  []  Old records  []  Other:  Most notable findings include:       Lab 05/21/24  0548 05/21/24  0532   WBC 8.20  --    HEMOGLOBIN 16.9  --    HEMATOCRIT 52.1*  --    PLATELETS 228  --    SODIUM 141  --    SODIUM, ARTERIAL  --  142.1   POTASSIUM 4.0  --    CHLORIDE 104  --    CO2 22.8  --    BUN 27*  --    CREATININE 1.74*  --    GLUCOSE 116*  --    GLUCOSE, ARTERIAL  --  130*   CALCIUM 9.8  --    TOTAL PROTEIN 6.7  --    ALBUMIN 4.1  --    GLOBULIN 2.6  --      CT Chest With Contrast Diagnostic    Result Date: 5/21/2024  CT CHEST W CONTRAST DIAGNOSTIC-  Date of Exam: 5/21/2024 6:19 AM  Indication: shortness of breath  Comparisons: 5/21/2024; 5/21/2020.  Technique: Axial CT images were obtained of the chest after the uneventful intravenous administration of an unspecified amount of Isovue-370 contrast agent .  Reconstructed 2D coronal and sagittal images were also obtained. No 3D reformations are provided for review. Pulmonary CTA protocol was used. Automated  exposure control and iterative construction methods were used.  Findings: No pulmonary embolism is seen. Bilateral pulmonary consolidation is present, predominantly in the bilateral lower lobes. The findings may represent infectious multifocal pneumonia. Aspiration pneumonia cannot be excluded. Pulmonary edema is thought to be least likely. Intraluminal filling defects involve the lower trachea (especially on the right) and the right mainstem bronchus. Aspirated content would be in the differential diagnosis. Retained secretions such as mucus are possible. There is narrowing of the anterior-posterior (AP) dimension of the lower trachea and the mainstem bronchi, which may be related to respiratory artifact. There are limitations on the study, especially due to respiratory artifact as well as the patient's upper extremities within the scan field-of-view as well as other external artifacts in the scan field-of-view. The best possible images were obtained. Minimal if any pleural effusion is seen. No pericardial effusion. There are coronary artery calcifications. Probably no cardiac enlargement is present. There is prominent pericardial fat. The stomach is distended with an air-fluid level. There may be intraluminal fluid in the thoracic esophagus (? GERD and/or esophageal dysmotility). There is bilateral gynecomastia. The no thyroid enlargement. No definite thoracic aortic aneurysm or dissection. The contrast bolus in the thoracic aorta is somewhat limited. There are small mediastinal lymph nodes. These findings may be reactive in nature. Degenerative changes are seen throughout the imaged spine. No definite acute fracture or aggressive osseous lesion is seen. There is pulmonary hypoinflation.      Impression: The study is limited. No pulmonary embolism is seen. Bilateral pulmonary consolidation is present, predominantly in the lower lobes. The findings may represent infectious multifocal pneumonia. Aspiration pneumonia  is possible. There are filling defects within the trachea and the right mainstem bronchus. They may represent mucus. Aspirated content cannot be excluded. Please see above comments for further detail.    Please note that portions of this note were completed with a voice recognition program.     Electronically Signed By-Pillo Grant MD On:5/21/2024 6:35 AM       High-sensitivity troponin 47.  NT BNP negative  Magnesium 1.2  EKG with atrial fibrillation with RVR  Lactic acid over 5  ABG 7.42/28/55/18      Assessment & Plan   Assessment / Plan     Active Hospital Problems:  Active Hospital Problems    Diagnosis     **Acute hypoxic respiratory failure     Elevated troponin level not due myocardial infarction        Impression:  Acute hypoxemic respiratory failure requiring NIPPV  Septic shock, present on admission  Lactic acidosis, clinically significant  Aspiration pneumonia from unspecified organism  Aspiration of vomitus and airways  Altered mental status  Toxic/metabolic encephalopathy  Acute kidney injury secondary to prerenal etiology  Hypomagnesemia  Atrial fibrillation with RVR  NSTEMI, type II from demand ischemia  Chronic compensated systolic congestive heart failure  Metabolic acidosis    Plan:  High clinical suspicion that the patient vomited and aspirated vomitus into his airways leading to decompensation.  Continue BiPAP for now on current settings.  Can alternate with Airvo as needed.  Very tenuous respiratory status with high risk of decompensation.  If decompensates, will intubate and perform bronchoscopy.  Wean O2 to keep SPO2 greater than 90%  Start vancomycin and cefepime.  Panculture patient's.  Check MRSA PCR DC vancomycin if negative.  Check procalcitonin, Legionella strep pneumo antigens  Continue norepinephrine and wean to keep mean arterial pressure greater than 65  Change IV fluids over to LR.  Bolus additional liters of LR in the ER  Agree with amiodarone drip and echocardiogram.   Appreciate cardiology input  Trend renal panel and electrolytes.  Replace magnesium IV  Stop Brovana, Pulmicort, bronchopulmonary hygiene and bronchodilator protocols  Will give IV steroids given septic shock and severe pneumonia  N.p.o. for now    Discussed CODE STATUS and mechanical ventilation with the family and patient.  They would want full aggressive measures at this time    DVT prophylaxis:  Medical DVT prophylaxis orders are present.    Code Status and Medical Interventions:   Ordered at: 05/21/24 0746     Level Of Support Discussed With:    Next of Kin (If No Surrogate)     Code Status (Patient has no pulse and is not breathing):    CPR (Attempt to Resuscitate)     Medical Interventions (Patient has pulse or is breathing):    Full Support        The patient is critically ill in the ICU with acute hypoxemic respiratory failure requiring NIPPV, septic shock, lactic acidosis, aspiration pneumonia, renal failure, atrial fibrillation with RVR, multiple electrolyte disturbances. Multidisciplinary bedside critical care rounds were performed with nursing staff, respiratory therapy, pharmacy, nutritional services, social work. I have personally reviewed the chart, labs and any pertinent imaging available.  I have spent 35 minutes of critical care time, excluding procedures, in the care of this patient.    Electronically signed by Zac Keith MD, 05/21/24, 12:24 PM EDT.

## 2024-05-21 NOTE — PROGRESS NOTES
"[facility] Clinical Pharmacy Services: Vancomycin Pharmacokinetic Initial Consult Note    Asaf Chilel is a 78 y.o. male who is on day 1 of pharmacy to dose vancomycin for Pneumonia.    Consult Information  Consulting Provider: Alvaro  Planned Duration of Therapy: 7 days per consult  Was Patient Receiving Prior to Admission/Consult?: No  Loading Dose Ordered or Given: 2000 mg on 5/21 at 0743  PK/PD Target: -600 mg/L.hr   Relevant ID History: N/A - no recent admissions or MRSA or PSAR growth from a respiratory cx in the last year   Other Antimicrobials: Cefepime    Imaging Reviewed?: Yes  5/21 CT Chest: no PE seen; bilateral pulmonary consolidation is present, predominantly in the lower lobes, findings may represent infectious multifocal PNA; aspiration PNA is possible; there are filling defects within the trachea and the right mainstem bronchus which may represent mucus; aspirated content cannot be excluded  5/21 CT head: study very limited due to patient motion artifact; age in-determinate but possibly severe acute, superimposed on chronic, sinus disease is suggested  5/21 CXR: reveals low lung volumes and bilateral atelectasis and/or infiltrate(s); findings may be related to atelectasis alone. Pulmonary edema is possible. Pneumonia cannot be excluded. The thoracic aorta is atherosclerotic. Degenerative changes involve the imaged spine and the bilateral shoulders.    Microbiology Data  MRSA PCR performed: 05/21/24; Result: Pending  Culture/Source:   5/21 MRSA PCR: ordered  5/21 Respiratory panel PCR: ordered  5/21 Respiratory cx, sputum: ordered  5/21 Legionella/strep pneumo urinary antigens: ordered  5/21 Blood cx 2/2: in process    Vitals/Labs  Ht: 182.9 cm (72\"); Wt: 94.2 kg (207 lb 10.8 oz)  No data recorded.   Estimated Creatinine Clearance: 42.3 mL/min (A) (by C-G formula based on SCr of 1.74 mg/dL (H)).     Results from last 7 days   Lab Units 05/21/24  0548   CREATININE mg/dL 1.74*   WBC " 10*3/mm3 8.20     Assessment/Plan:    Vancomycin Dose:  1000 mg IV every 24 hours; which provides the following predicted parameters:  AUC24,ss: 468 mg/L.hr  PAUC*: 67 %  Ctrough,ss: 15.2 mg/L  Pconc*: 26 %  Tox.: 10 %  Vanc Random planned for prior to 3rd or 4th dose   Patient has order for Basic Metabolic Panel    Pharmacy will follow patient's kidney function and will adjust doses and obtain levels as necessary. Thank you for involving pharmacy in this patient's care. Please contact pharmacy with any questions or concerns.                           Antonietta Nice RPH  Clinical Pharmacist

## 2024-05-21 NOTE — ED PROVIDER NOTES
Time: 6:30 AM EDT  Date of encounter:  5/21/2024  Independent Historian/Clinical History and Information was obtained by:   Patient and Family    History is limited by: Altered Mental Status    Chief Complaint: shortness of breath      History of Present Illness:  Patient is a 78 y.o. year old male who presents to the emergency department for evaluation of Shortness of breath.  According to patient's wife when he went to bed last night he was acting normal.  He woke up around 1 having difficulty breathing and being very confused.  Patient not able to give much information other than his name.  He is moving all extremities.    HPI    Patient Care Team  Primary Care Provider: Naheed Mane APRN    Past Medical History:     No Known Allergies  Past Medical History:   Diagnosis Date    Anxiety     Arthritis 20 years    Asthma 5 years    Cancer     Cardiomyopathy     Cataract 5years    CHF (congestive heart failure) 2015    Surgery    COPD (chronic obstructive pulmonary disease)     Coronary artery disease     Diverticulosis 20 years    Eating disorder     Erectile dysfunction 5 years    Gastroesophageal reflux     Gout     Headache     High cholesterol     Hip pain     Hoarseness, chronic     Hypertension     Hypothyroid     AJ (iron deficiency anemia) 05/07/2015    Inflammatory bowel disease     Limb pain     Lumbago 09/03/2013    MRI shows abnormal appearance of bone marrow. Workup negative pet hem/onc    Myocardial infarction     Pancreatitis     Peripheral vascular disease 03/28/2015    Pneumonia     Sciatica 09/03/2013    Scoliosis     Tremor     Visual impairment 2015    Surgery     Past Surgical History:   Procedure Laterality Date    CARDIAC SURGERY  2015    COLONOSCOPY  2014    normal Dr. Bingham    COLONOSCOPY N/A 04/27/2022    Procedure: COLONOSCOPY WITH POLYPECTOMY;  Surgeon: Juarez Seo MD;  Location: [facility] ENDOSCOPY;  Service: General;  Laterality: N/A;  DIVERTICULOSIS, COLON POLYP    CORONARY  ARTERY BYPASS GRAFT      CORONARY STENT PLACEMENT  2015    ENDOSCOPY N/A 04/27/2022    Procedure: ESOPHAGOGASTRODUODENOSCOPY WITH BIOPSIES AND POLYPECTOMY;  Surgeon: Juarez Seo MD;  Location: [facility] ENDOSCOPY;  Service: General;  Laterality: N/A;  GASTRIC POLYPS    EYE SURGERY Right     cancer removed x 2    JOINT REPLACEMENT      LYMPH NODE BIOPSY      MANDIBLE SURGERY      cancer removed    SHOULDER SURGERY Right      Family History   Problem Relation Age of Onset    Cancer Mother     Heart disease Mother     Hyperlipidemia Mother     Hypertension Mother     Cancer Father         Lung    Arthritis Father     Cancer Brother         Throat    Cancer Other     Heart failure Other     Other Other         spine problems     Cancer Brother         Brain    Cancer Brother         Neck    Thyroid disease Brother     Malig Hyperthermia Neg Hx        Home Medications:  Prior to Admission medications    Medication Sig Start Date End Date Taking? Authorizing Provider   albuterol sulfate  (90 Base) MCG/ACT inhaler Inhale 1 puff Every 4 (Four) Hours As Needed for Shortness of Air. 7/27/22   Concetta Carson APRN   allopurinol (ZYLOPRIM) 300 MG tablet TAKE 1 TABLET BY MOUTH EVERY DAY 4/12/24   Naheed Mane APRN   amitriptyline (ELAVIL) 25 MG tablet TAKE 1 TABLET BY MOUTH EVERYDAY AT BEDTIME 4/12/24   Naheed Mane APRN   aspirin 81 MG EC tablet Take 1 tablet by mouth Daily. Last dose Friday 4/22/22    Provider, MD Jona   atorvastatin (LIPITOR) 80 MG tablet TAKE 1 TABLET BY MOUTH EVERY DAY 3/14/24   Naheed Mane APRN   busPIRone (BUSPAR) 10 MG tablet Take 1 tablet by mouth 3 (Three) Times a Day. 2/29/24   Naheed Mane APRN   clotrimazole-betamethasone (LOTRISONE) 1-0.05 % cream APPLY TOPICALLY TO THE APPROPRIATE AREA TWICE A DAY AS DIRECTED 6/5/23   Naheed Mane APRN   ferrous sulfate 325 (65 FE) MG tablet TAKE 1 TABLET BY MOUTH EVERY DAY WITH BREAKFAST 3/7/24   Naheed Mane APRN  "  ketoconazole (NIZORAL) 2 % shampoo Apply 1 application topically to the appropriate area as directed 1 (One) Time Per Week. 21   ProviderJona MD   lisinopril (PRINIVIL,ZESTRIL) 40 MG tablet TAKE 1 TABLET BY MOUTH EVERY DAY 23   Naheed Mane APRN   meclizine (ANTIVERT) 25 MG tablet TAKE 1 TABLET BY MOUTH THREE TIMES A DAY AS NEEDED FOR NAUSEA 24   Naheed Mane APRN   multivitamin with minerals tablet tablet Take 1 tablet by mouth Daily.    Jona Gibson MD   omeprazole (priLOSEC) 20 MG capsule TAKE 2 CAPSULES BY MOUTH EVERY DAY 3/7/24   Naheed Mane APRN   propranolol (INDERAL) 40 MG tablet TAKE 1 TABLET BY MOUTH THREE TIMES A DAY 3/14/24   Naheed Mane APRN   spironolactone (ALDACTONE) 25 MG tablet Take 1 tablet daily except  on Sundays take 0.5 tablet 23   Patty Roberts APRN   Synthroid 88 MCG tablet Take 1 tablet by mouth. 21   Provider, Historical, MD   Trelegy Ellippeter 100-62.5-25 MCG/ACT inhaler INHALE 1 PUFF DAILY. INDICATIONS: 2 BOX, FIRST BOX LOT: [id number] EXP: 3/2024, 2ND BOX LOT: [id number] EXP: 2024 3/14/24   Naheed Mane APRN   Vitamins A & D (magic barrier cream) Apply 1 application  topically to the appropriate area as directed 2 (Two) Times a Day As Needed (rash). 23   Naheed Mane APRN        Social History:   Social History     Tobacco Use    Smoking status: Former     Current packs/day: 0.00     Average packs/day: 1 pack/day for 44.0 years (44.0 ttl pk-yrs)     Types: Cigarettes     Start date: 1956     Quit date: 2000     Years since quittin.4    Smokeless tobacco: Never   Vaping Use    Vaping status: Never Used   Substance Use Topics    Alcohol use: Not Currently     Comment: Quit     Drug use: Never         Review of Systems:  Review of Systems   Unable to perform ROS: Mental status change        Physical Exam:  /62   Pulse 64   Temp 99.3 °F (37.4 °C) (Rectal)   Resp (!) 37   Ht 182.9 cm (72\")   Wt 94.2 kg " (207 lb 10.8 oz)   SpO2 91%   BMI 28.17 kg/m²     Physical Exam  Constitutional:       Appearance: He is ill-appearing.   HENT:      Head: Normocephalic and atraumatic.   Eyes:      Extraocular Movements: Extraocular movements intact.      Pupils: Pupils are equal, round, and reactive to light.   Cardiovascular:      Rate and Rhythm: Tachycardia present. Rhythm irregular.   Pulmonary:      Effort: Tachypnea present.      Breath sounds: Rhonchi present.   Chest:      Chest wall: No mass or tenderness.   Abdominal:      Palpations: Abdomen is soft.      Tenderness: There is no abdominal tenderness.   Musculoskeletal:         General: Normal range of motion.      Right lower leg: Edema present.      Left lower leg: Edema present.   Skin:     General: Skin is warm.      Capillary Refill: Capillary refill takes less than 2 seconds.   Neurological:      Mental Status: He is alert. He is disoriented.      Comments: Oriented to self only. Neuro exam limited patient is moving all extremities                  Procedures:  Procedures      Medical Decision Making:      Comorbidities that affect care:    Hyperlipidemia, peripheral vascular disease, cardiomyopathy, coronary artery disease, hypertension, CHF, COPD.    External Notes reviewed:    Previous Clinic Note: Patient was seen by his PCP on 5/16/2024 for hyperlipidemia, CAD, anxiety, anemia, hypertension, hypothyroidism, asthma, Tapia's esophagus      The following orders were placed and all results were independently analyzed by me:  Orders Placed This Encounter   Procedures    Blood Culture - Blood,    Blood Culture - Blood,    Legionella Antigen, Urine - Urine, Urine, Clean Catch    S. Pneumo Ag Urine or CSF - Urine, Urine, Clean Catch    Respiratory Culture - Aspirate, NT Suction    Respiratory Panel PCR w/COVID-19(SARS-CoV-2) GILDA/LUZ/NICK/PAD/COR/RADHA In-House, NP Swab in UTM/VTM, 2 HR TAT - Swab, Nasopharynx    MRSA Screen, PCR (Inpatient) - Swab, Nares    XR Chest  1 View    CT Head Without Contrast    CT Chest With Contrast Diagnostic    XR Chest 1 View    [place] Draw    Comprehensive Metabolic Panel    BNP    Single High Sensitivity Troponin T    ABG with Co-Ox and Electrolytes    CBC Auto Differential    Lactic Acid, Plasma    Urinalysis With Culture If Indicated - Urine, Clean Catch    Scan Slide    STAT Lactic Acid, Reflex    Basic Metabolic Panel    STAT Lactic Acid, Reflex    Magnesium    Procalcitonin    Comprehensive Metabolic Panel    Magnesium    Phosphorus    Lactic Acid, Plasma    STAT Lactic Acid, Reflex    STAT Lactic Acid, Reflex    Urinalysis, Microscopic Only - Urine, Clean Catch    NPO Diet NPO Type: Strict NPO    Undress & Gown    Continuous Pulse Oximetry    Vital Signs    Vital Signs    Intake & Output    Weigh Patient    Oral Care    Saline Lock & Maintain IV Access    Ok to use central line  Nursing Communication    Code Status and Medical Interventions:    Inpatient Hospitalist Consult    Inpatient Cardiology Consult    Inpatient Pulmonology Consult    Inpatient Nutrition Consult    Oxygen Therapy- Nasal Cannula; Titrate 1-6 LPM Per SpO2; 90 - 95%    RT to Initiate Bronchopulmonary Hygiene Protocol    RT to Initiate Bronchodilator Protocol    Chest Physiotherapy (PD & P)    POC Glucose Once    ECG 12 Lead ED Triage Standing Order; SOA    ECG 12 Lead Rhythm Change    ECG 12 Lead Rhythm Change    ECG 12 Lead Chest Pain    Adult Transthoracic Echo Complete W/ Cont if Necessary Per Protocol    Insert Peripheral IV    Insert Peripheral IV    Inpatient Admission    CBC & Differential    Green Top (Gel)    Lavender Top    Gold Top - SST    Light Blue Top    CBC & Differential       Medications Given in the Emergency Department:  Medications   sodium chloride 0.9 % flush 10 mL (has no administration in time range)   norepinephrine (LEVOPHED) 8 mg in 250 mL NS infusion (premix) (0.28 mcg/kg/min × 94.2 kg Intravenous Rate/Dose Verify 5/21/24 2000)   amiodarone  360 mg in 200 mL D5W infusion (0.5 mg/min Intravenous Rate/Dose Verify 5/21/24 2000)   sodium chloride 0.9 % flush 10 mL (10 mL Intravenous Not Given 5/21/24 2033)   sodium chloride 0.9 % flush 10 mL (has no administration in time range)   sodium chloride 0.9 % infusion 40 mL (has no administration in time range)   heparin (porcine) 5000 UNIT/ML injection 5,000 Units (5,000 Units Subcutaneous Given 5/21/24 2105)   sennosides-docusate (PERICOLACE) 8.6-50 MG per tablet 2 tablet (has no administration in time range)     And   polyethylene glycol (MIRALAX) packet 17 g (has no administration in time range)     And   bisacodyl (DULCOLAX) EC tablet 5 mg (has no administration in time range)     And   bisacodyl (DULCOLAX) suppository 10 mg (has no administration in time range)   Pharmacy to Dose Cefepime (has no administration in time range)   Pharmacy to dose vancomycin (has no administration in time range)   arformoterol (BROVANA) nebulizer solution 15 mcg ( Nebulization Canceled Entry 5/21/24 2034)   budesonide (PULMICORT) nebulizer solution 0.5 mg ( Nebulization Canceled Entry 5/21/24 2035)   ipratropium-albuterol (DUO-NEB) nebulizer solution 3 mL (3 mL Nebulization Given 5/21/24 2006)   cefepime 2000 mg IVPB in 100 mL NS (VTB) (2,000 mg Intravenous New Bag 5/21/24 1753)   prochlorperazine (COMPAZINE) injection 5 mg (5 mg Intravenous Given 5/21/24 0821)   vancomycin 1000 mg/250 mL 0.9% NS IVPB ([facility]) (1,000 mg Intravenous New Bag 5/21/24 2105)   lactated ringers infusion (120 mL/hr Intravenous Rate/Dose Verify 5/21/24 2000)   methylPREDNISolone sodium succinate (SOLU-Medrol) injection 40 mg (40 mg Intravenous Given 5/21/24 2105)   magnesium sulfate 2g/50 mL (PREMIX) infusion (2 g Intravenous Not Given 5/21/24 1516)   aspirin chewable tablet 81 mg (81 mg Oral Not Given 5/21/24 7438)   vasopressin (PITRESSIN) 20 units in 100 mL NS infusion (0.04 Units/min Intravenous Rate/Dose Verify 5/21/24 2000)   vancomycin IVPB 2000 mg  in 0.9% Sodium Chloride 500 mL (0 mg Intravenous Stopped 5/21/24 1123)   cefepime 2000 mg IVPB in 100 mL NS (VTB) (0 mg Intravenous Stopped 5/21/24 0701)   sodium chloride 0.9 % bolus 1,000 mL (0 mL Intravenous Stopped 5/21/24 0640)   iopamidol (ISOVUE-370) 76 % injection 100 mL (100 mL Intravenous Given 5/21/24 0620)   dilTIAZem (CARDIZEM) injection 10 mg (10 mg Intravenous Not Given 5/21/24 0748)   sodium chloride 0.9 % bolus 1,000 mL (0 mL Intravenous Stopped 5/21/24 0841)   amiodarone 150 mg in 100 mL D5W (loading dose) (0 mg Intravenous Stopped 5/21/24 0713)   sodium chloride 0.9 % bolus 1,000 mL (1,000 mL Intravenous New Bag 5/21/24 1152)   Sulfur Hexafluoride Microsph (LUMASON) 60.7-25 MG IV reconstituted suspension reconstituted suspension 5 mL (5 mL Intravenous Given 5/21/24 1255)   lactated ringers bolus 2,000 mL (2,000 mL Intravenous New Bag 5/21/24 1334)   magnesium sulfate 4g/100mL (PREMIX) infusion (4 g Intravenous New Bag 5/21/24 1619)   Lidocaine HCl gel (XYLOCAINE) urethral/mucosal syringe ( Urethral Given 5/21/24 1803)        ED Course:         Labs:    Lab Results (last 24 hours)       Procedure Component Value Units Date/Time    ABG with Co-Ox and Electrolytes [[id number]]  (Abnormal) Collected: 05/21/24 0532    Specimen: Arterial Blood Updated: 05/21/24 0538     pH, Arterial 7.428 pH units      pCO2, Arterial 28.2 mm Hg      pO2, Arterial 55.9 mm Hg      HCO3, Arterial 18.2 mmol/L      Base Excess, Arterial -4.3 mmol/L      O2 Saturation, Arterial 89.0 %      Hemoglobin, Blood Gas 17.2 g/dL      Carboxyhemoglobin 0.4 %      Methemoglobin 0.10 %      Oxyhemoglobin 88.6 %      FHHB 10.9 %      Anuj's Test Positive     Note --     Site Arterial: left radial     Modality Non Rebreather     FIO2 100 %      Flow Rate 15 lpm      Sodium, Arterial 142.1 mmol/L      Potassium, Arterial 3.99 mmol/L      Ionized Calcium, Arterial 1.21 mmol/L      Chloride, Arterial 108 mmol/L      Glucose, Arterial 130  mg/dL      Lactate, Arterial 4.06 mmol/L      PO2/FIO2 56    POC Glucose Once [[id number]]  (Abnormal) Collected: 05/21/24 0533    Specimen: Blood Updated: 05/21/24 0622     Glucose 120 mg/dL      Comment: Serial Number: [id number]:  [id number]       CBC & Differential [[id number]]  (Abnormal) Collected: 05/21/24 0548    Specimen: Blood Updated: 05/21/24 0646    Narrative:      The following orders were created for panel order CBC & Differential.  Procedure                               Abnormality         Status                     ---------                               -----------         ------                     CBC Auto Differential[[id number]]        Abnormal            Final result               Scan Slide[[id number]]                                       Final result                 Please view results for these tests on the individual orders.    Comprehensive Metabolic Panel [[id number]]  (Abnormal) Collected: 05/21/24 0548    Specimen: Blood Updated: 05/21/24 0625     Glucose 116 mg/dL      BUN 27 mg/dL      Creatinine 1.74 mg/dL      Sodium 141 mmol/L      Potassium 4.0 mmol/L      Comment: Slight hemolysis detected by analyzer. Result may be falsely elevated.        Chloride 104 mmol/L      CO2 22.8 mmol/L      Calcium 9.8 mg/dL      Total Protein 6.7 g/dL      Albumin 4.1 g/dL      ALT (SGPT) 33 U/L      AST (SGOT) 38 U/L      Alkaline Phosphatase 71 U/L      Total Bilirubin 1.0 mg/dL      Globulin 2.6 gm/dL      A/G Ratio 1.6 g/dL      BUN/Creatinine Ratio 15.5     Anion Gap 14.2 mmol/L      eGFR 39.9 mL/min/1.73     Narrative:      GFR Normal >60  Chronic Kidney Disease <60  Kidney Failure <15    The GFR formula is only valid for adults with stable renal function between ages 18 and 70.    BNP [[id number]]  (Normal) Collected: 05/21/24 0548    Specimen: Blood Updated: 05/21/24 0614     proBNP 428.2 pg/mL     Narrative:      This assay is used as an aid in the diagnosis of individuals suspected of  having heart failure. It can be used as an aid in the diagnosis of acute decompensated heart failure (ADHF) in patients presenting with signs and symptoms of ADHF to the emergency department (ED). In addition, NT-proBNP of <300 pg/mL indicates ADHF is not likely.    Age Range Result Interpretation  NT-proBNP Concentration (pg/mL:      <50             Positive            >450                   Gray                 300-450                    Negative             <300    50-75           Positive            >900                  Gray                300-900                  Negative            <300      >75             Positive            >1800                  Gray                300-1800                  Negative            <300    Single High Sensitivity Troponin T [[id number]]  (Abnormal) Collected: 05/21/24 0548    Specimen: Blood Updated: 05/21/24 0625     HS Troponin T 47 ng/L     Narrative:      High Sensitive Troponin T Reference Range:  <14.0 ng/L- Negative Female for AMI  <22.0 ng/L- Negative Male for AMI  >=14 - Abnormal Female indicating possible myocardial injury.  >=22 - Abnormal Male indicating possible myocardial injury.   Clinicians would have to utilize clinical acumen, EKG, Troponin, and serial changes to determine if it is an Acute Myocardial Infarction or myocardial injury due to an underlying chronic condition.         CBC Auto Differential [[id number]]  (Abnormal) Collected: 05/21/24 0548    Specimen: Blood Updated: 05/21/24 0602     WBC 8.20 10*3/mm3      RBC 5.70 10*6/mm3      Hemoglobin 16.9 g/dL      Hematocrit 52.1 %      MCV 91.4 fL      MCH 29.6 pg      MCHC 32.4 g/dL      RDW 15.7 %      RDW-SD 51.8 fl      MPV 10.4 fL      Platelets 228 10*3/mm3      Neutrophil % 73.6 %      Lymphocyte % 21.3 %      Monocyte % 3.5 %      Eosinophil % 0.7 %      Basophil % 0.4 %      Immature Grans % 0.5 %      Neutrophils, Absolute 6.03 10*3/mm3      Lymphocytes, Absolute 1.75 10*3/mm3      Monocytes,  "Absolute 0.29 10*3/mm3      Eosinophils, Absolute 0.06 10*3/mm3      Basophils, Absolute 0.03 10*3/mm3      Immature Grans, Absolute 0.04 10*3/mm3      nRBC 0.0 /100 WBC     Blood Culture - Blood, Hand, Right [[id number]] Collected: 05/21/24 0548    Specimen: Blood from Hand, Right Updated: 05/21/24 0551    Blood Culture - Blood, Hand, Right [[id number]] Collected: 05/21/24 0548    Specimen: Blood from Hand, Right Updated: 05/21/24 0551    Lactic Acid, Plasma [[id number]]  (Abnormal) Collected: 05/21/24 0548    Specimen: Blood Updated: 05/21/24 0635     Lactate 4.6 mmol/L     Scan Slide [[id number]] Collected: 05/21/24 0548    Specimen: Blood Updated: 05/21/24 0646     Acanthocytes Slight/1+     Ovalocytes Slight/1+     WBC Morphology Normal     Large Platelets Slight/1+    Magnesium [[id number]]  (Abnormal) Collected: 05/21/24 0548    Specimen: Blood Updated: 05/21/24 1106     Magnesium 1.2 mg/dL     Procalcitonin [[id number]]  (Abnormal) Collected: 05/21/24 0548    Specimen: Blood Updated: 05/21/24 1254     Procalcitonin 17.96 ng/mL     Narrative:      As a Marker for Sepsis (Non-Neonates):    1. <0.5 ng/mL represents a low risk of severe sepsis and/or septic shock.  2. >2 ng/mL represents a high risk of severe sepsis and/or septic shock.    As a Marker for Lower Respiratory Tract Infections that require antibiotic therapy:    PCT on Admission    Antibiotic Therapy       6-12 Hrs later    >0.5                Strongly Recommended  >0.25 - <0.5        Recommended  0.1 - 0.25          Discouraged              Remeasure/reassess PCT  <0.1                Strongly Discouraged     Remeasure/reassess PCT    As 28 day mortality risk marker: \"Change in Procalcitonin Result\" (>80% or <=80%) if Day 0 (or Day 1) and Day 4 values are available. Refer to http://[email or web address]-pct-[email or web address]    Change in PCT <=80%  A decrease of PCT levels below or equal to 80% defines a positive change in PCT test result representing a higher risk for " 28-day all-cause mortality of patients diagnosed with severe sepsis for septic shock.    Change in PCT >80%  A decrease of PCT levels of more than 80% defines a negative change in PCT result representing a lower risk for 28-day all-cause mortality of patients diagnosed with severe sepsis or septic shock.    This test is Prognostic not Diagnostic, if elevated correlate with clinical findings before administering antibiotic treatment.        STAT Lactic Acid, Reflex [[id number]]  (Abnormal) Collected: 05/21/24 1009    Specimen: Blood from Hand, Left Updated: 05/21/24 1049     Lactate 5.1 mmol/L     STAT Lactic Acid, Reflex [[id number]]  (Abnormal) Collected: 05/21/24 1341    Specimen: Blood from Hand, Left Updated: 05/21/24 1413     Lactate 4.8 mmol/L     STAT Lactic Acid, Reflex [[id number]]  (Abnormal) Collected: 05/21/24 1618    Specimen: Blood from Hand, Right Updated: 05/21/24 1659     Lactate 4.9 mmol/L     Respiratory Panel PCR w/COVID-19(SARS-CoV-2) GILDA/LUZ/NICK/PAD/COR/RADHA In-House, NP Swab in [place] 2 HR TAT - Swab, Nasopharynx [[id number]]  (Abnormal) Collected: 05/21/24 1628    Specimen: Swab from Nasopharynx Updated: 05/21/24 1735     ADENOVIRUS, PCR Not Detected     Coronavirus 229E Not Detected     Coronavirus HKU1 Not Detected     Coronavirus NL63 Not Detected     Coronavirus OC43 Not Detected     COVID19 Not Detected     Human Metapneumovirus Not Detected     Human Rhinovirus/Enterovirus Not Detected     Influenza A PCR Not Detected     Influenza B PCR Not Detected     Parainfluenza Virus 1 Not Detected     Parainfluenza Virus 2 Not Detected     Parainfluenza Virus 3 Detected     Parainfluenza Virus 4 Not Detected     RSV, PCR Not Detected     Bordetella pertussis pcr Not Detected     Bordetella parapertussis PCR Not Detected     Chlamydophila pneumoniae PCR Not Detected     Mycoplasma pneumo by PCR Not Detected    Narrative:      In the setting of a positive respiratory panel with a viral infection PLUS  a negative procalcitonin without other underlying concern for bacterial infection, consider observing off antibiotics or discontinuation of antibiotics and continue supportive care. If the respiratory panel is positive for atypical bacterial infection (Bordetella pertussis, Chlamydophila pneumoniae, or Mycoplasma pneumoniae), consider antibiotic de-escalation to target atypical bacterial infection.    MRSA Screen, PCR (Inpatient) - Swab, Nares [[id number]]  (Normal) Collected: 05/21/24 1628    Specimen: Swab from Nares Updated: 05/21/24 1818     MRSA PCR No MRSA Detected    Narrative:      The negative predictive value of this diagnostic test is high and should only be used to consider de-escalating anti-MRSA therapy. A positive result may indicate colonization with MRSA and must be correlated clinically.    Urinalysis With Culture If Indicated - Urine, Clean Catch [[id number]]  (Abnormal) Collected: 05/21/24 1803    Specimen: Urine, Clean Catch Updated: 05/21/24 1843     Color, UA Yellow     Appearance, UA Clear     pH, UA <=5.0     Specific Gravity, UA >1.030     Glucose, UA Negative     Ketones, UA Trace     Bilirubin, UA Negative     Blood, UA Small (1+)     Protein, UA 30 mg/dL (1+)     Leuk Esterase, UA Negative     Nitrite, UA Negative     Urobilinogen, UA 0.2 E.U./dL    Narrative:      In absence of clinical symptoms, the presence of pyuria, bacteria, and/or nitrites on the urinalysis result does not correlate with infection.    Legionella Antigen, Urine - Urine, Urine, Clean Catch [[id number]]  (Normal) Collected: 05/21/24 1803    Specimen: Urine, Clean Catch Updated: 05/21/24 1827     LEGIONELLA ANTIGEN, URINE Negative    S. Pneumo Ag Urine or CSF - Urine, Urine, Clean Catch [[id number]]  (Abnormal) Collected: 05/21/24 1803    Specimen: Urine, Clean Catch Updated: 05/21/24 1827     Strep Pneumo Ag Positive    Urinalysis, Microscopic Only - Urine, Clean Catch [[id number]]  (Abnormal) Collected: 05/21/24 1803     Specimen: Urine, Clean Catch Updated: 05/21/24 1846     RBC, UA 0-2 /HPF      WBC, UA 0-2 /HPF      Comment: Urine culture not indicated.        Bacteria, UA None Seen /HPF      Squamous Epithelial Cells, UA None Seen /HPF      Hyaline Casts, UA None Seen /LPF      Sperm, UA Occasional /HPF      Methodology Manual Light Microscopy             Imaging:    XR Chest 1 View    Result Date: 5/21/2024  XR CHEST 1 VW-  Date of Exam: 5/21/2024 4:45 PM  Indication: Subclavian line placement  Comparison: May 21, 2024  Findings: There is a right subclavian line noted with its tip in the distal superior vena cava. There is no pneumothorax. The heart is magnified. The lungs seem relatively clear. There is improved appearance to the basilar areas. There are no pleural effusions.      Impression: 1.  Right subclavian line tip is in the distal superior vena cava. 2.  Prominent densities in the basilar areas noted on the earlier chest x-ray less defined on the current study.   Electronically Signed By-Gerald Nunez MD On:5/21/2024 5:00 PM      Adult Transthoracic Echo Complete W/ Cont if Necessary Per Protocol    Result Date: 5/21/2024    Technically difficult study due to limited windows.  IV Lumason contrast was used to enhance endocardial borders.   Left ventricular ejection fraction appears to be 61 - 65%.   Left ventricular wall thickness is consistent with moderate septal asymmetric hypertrophy. Sigmoid-shaped ventricular septum is present.   Left ventricular diastolic function was indeterminate.   Cardiac valves morphology was not well-visualized on this study.  No hemodynamically significant valvular pathology was noted by color flow/velocity Doppler.     CT Chest With Contrast Diagnostic    Result Date: 5/21/2024  CT CHEST W CONTRAST DIAGNOSTIC-  Date of Exam: 5/21/2024 6:19 AM  Indication: shortness of breath  Comparisons: 5/21/2024; 5/21/2020.  Technique: Axial CT images were obtained of the chest after the uneventful  intravenous administration of an unspecified amount of Isovue-370 contrast agent .  Reconstructed 2D coronal and sagittal images were also obtained. No 3D reformations are provided for review. Pulmonary CTA protocol was used. Automated exposure control and iterative construction methods were used.  Findings: No pulmonary embolism is seen. Bilateral pulmonary consolidation is present, predominantly in the bilateral lower lobes. The findings may represent infectious multifocal pneumonia. Aspiration pneumonia cannot be excluded. Pulmonary edema is thought to be least likely. Intraluminal filling defects involve the lower trachea (especially on the right) and the right mainstem bronchus. Aspirated content would be in the differential diagnosis. Retained secretions such as mucus are possible. There is narrowing of the anterior-posterior (AP) dimension of the lower trachea and the mainstem bronchi, which may be related to respiratory artifact. There are limitations on the study, especially due to respiratory artifact as well as the patient's upper extremities within the scan field-of-view as well as other external artifacts in the scan field-of-view. The best possible images were obtained. Minimal if any pleural effusion is seen. No pericardial effusion. There are coronary artery calcifications. Probably no cardiac enlargement is present. There is prominent pericardial fat. The stomach is distended with an air-fluid level. There may be intraluminal fluid in the thoracic esophagus (? GERD and/or esophageal dysmotility). There is bilateral gynecomastia. The no thyroid enlargement. No definite thoracic aortic aneurysm or dissection. The contrast bolus in the thoracic aorta is somewhat limited. There are small mediastinal lymph nodes. These findings may be reactive in nature. Degenerative changes are seen throughout the imaged spine. No definite acute fracture or aggressive osseous lesion is seen. There is pulmonary  hypoinflation.      The study is limited. No pulmonary embolism is seen. Bilateral pulmonary consolidation is present, predominantly in the lower lobes. The findings may represent infectious multifocal pneumonia. Aspiration pneumonia is possible. There are filling defects within the trachea and the right mainstem bronchus. They may represent mucus. Aspirated content cannot be excluded. Please see above comments for further detail.    Please note that portions of this note were completed with a voice recognition program.     Electronically Signed By-Pillo Grant MD On:5/21/2024 6:35 AM      CT Head Without Contrast    Result Date: 5/21/2024  CT HEAD WO CONTRAST-  Date of Exam: 5/21/2024 6:07 AM  Indication: ams (altered mental status)  Comparison: None available. That is, none of the prior relevant comparison studies is able to be retrieved from the Imaging Archive during the time of this interpretation for correlation.  Technique: Axial CT images were obtained of the head without contrast administration.  Reconstructed 2D coronal and sagittal images were also obtained. Automated exposure control and iterative construction methods were used.  Findings: The study is limited due to motion artifact. Grossly, no acute intracranial hemorrhage is seen. No gross evidence of an acute infarct. The extra-axial spaces and the ventricular system are prominent, suggesting global atrophy. No midline shift or acute intracranial herniation syndrome is suspected. Considering the degree of artifact on the study, subtle pathology may be obscured. There are opacified bilateral maxillary paranasal sinuses with suspected mucoperiosteal thickening. Sinonasal polyposis cannot be excluded. Fungal sinusitis is possible. There may be acute superimposed upon chronic sinusitis, as well. Mucosal thickening and/or air-fluid levels may involve the bilateral sphenoid paranasal sinuses. The middle ear clefts are obscured by motion artifact. The  orbits are obscured. It is suspected the patient has undergone bilateral cataract extractions with intraocular lens implants. No gross evidence of an acute fracture.      The study is very limited due to patient motion artifact. Grossly, no acute brain abnormality is seen. Age-indeterminate but possibly severe acute, superimposed upon chronic, sinus disease is suggested.    Please note that portions of this note were completed with a voice recognition program.     Electronically Signed By-Pillo Grant MD On:5/21/2024 6:22 AM      XR Chest 1 View    Result Date: 5/21/2024  XR CHEST 1 VW-  Date of Exam: 5/21/2024 5:40 AM  Indication: SOA/SOB/shortness of air/shortness of breath.  Comparison: 4/29/2020.  FINDINGS: A single AP semiupright portable chest radiograph reveals low lung volumes and bilateral atelectasis and/or infiltrate(s). The findings may be related to atelectasis alone. Pulmonary edema is possible. Pneumonia cannot be excluded. Probably no cardiac enlargement. No pneumothorax. External artifacts obscure detail. No definite sizable pleural effusion is suggested. The thoracic aorta is atherosclerotic. Degenerative changes involve the imaged spine and the bilateral shoulders.      Pulmonary hypoinflation is seen with new bilateral airspace opacities, which may represent atelectasis alone (such as related to the low lung volumes). Pulmonary edema or pneumonia cannot be excluded. Probably no cardiac enlargement. Consider close interval clinical and imaging follow-up to ensure a benign progression.   Please note that portions of this note were completed with a voice recognition program.     Electronically Signed By-Pillo Grant MD On:5/21/2024 6:14 AM         Differential Diagnosis and Discussion:    Altered Mental Status: Based on the patient's signs and symptoms, differential diagnosis includes but is not limited to meningitis, stroke, sepsis, subarachnoid hemorrhage, intracranial bleeding, encephalitis,  and metabolic encephalopathy.  Dyspnea: Differential diagnosis includes but is not limited to metabolic acidosis, neurological disorders, psychogenic, asthma, pneumothorax, upper airway obstruction, COPD, pneumonia, noncardiogenic pulmonary edema, interstitial lung disease, anemia, congestive heart failure, and pulmonary embolism    All labs were reviewed and interpreted by me.  All X-rays impressions were independently interpreted by me.  EKG was interpreted by me.  CT scan radiology impression was interpreted by me.    MDM  Number of Diagnoses or Management Options  Acute hypoxemic respiratory failure  Pneumonia due to infectious organism, unspecified laterality, unspecified part of lung  Sepsis, due to unspecified organism, unspecified whether acute organ dysfunction present  Diagnosis management comments: On arrival patient is ill-appearing.  He appears cyanotic.  He was placed on supplemental oxygen.  Blood gas showed a pO2 of 55.9 and a pCO2 of 28.2.  Patient was initially tried on high flow oxygen but later switched to BiPAP.  Creatinine is elevated to 1.7.  Lactic acid 4.6.  Patient is also tachycardic.  CT of his head did not show acute finding.  CT of his chest showed bilateral infiltrates.  He was started on broad-spectrum antibiotics.  Discussed patient with hospitalist and will admit for further care.       Amount and/or Complexity of Data Reviewed  Clinical lab tests: reviewed  Tests in the radiology section of CPT®: reviewed  Decide to obtain previous medical records or to obtain history from someone other than the patient: yes             Sepsis criteria was met in the emergency department and the Sepsis protocol (including antibiotic administration) was initiated.      SIRS criteria considered:   1.  Temperature > 100.4 or <96.8    2.  Heart Rate > 90    3.  Respiratory Rate > 22    4.  WBC > 12K or <4K.             Severe Sepsis:     Respiratory: Mechanical Ventilation or Bipap  Hypotension: SBP >  90 or MAP < 65  Renal: Creatinine > 2  Metabolic: Lactic Acid > 2  Hematologic: Platelets < 100K or INR > 1.5  Hepatic: BILI  >  2  CNS: Sudden AMS     Septic Shock:     Severe Sepsis + Persistent hypotension or Lactic Acid > 4     Normal saline bolus, Antibiotics, and final disposition was based on these definitions.        Sepsis was recognized at 0600    Antibiotics were ordered.     30 cc/kg bolus was indicated.       Total Critical Care time of 45 minutes. Total critical care time documented does not include time spent on separately billed procedures for services of nurses or physician assistants. I personally saw and examined the patient. I have reviewed all diagnostic interpretations and treatment plans as written. I was present for the key portions of any procedures performed and the inclusive time noted in any critical care statement. Critical care time includes patient management by me, time spent at the patients bedside,  time to review lab and imaging results, discussing patient care, documentation in the medical record, and time spent with family or caregiver.    Patient Care Considerations:    CT ABDOMEN AND PELVIS: I considered ordering a CT scan of the abdomen and pelvis however no abdominal pain      Consultants/Shared Management Plan:    Hospitalist: I have discussed the case with Dr John who agrees to accept the patient for admission.    Social Determinants of Health:    Patient is independent, reliable, and has access to care.       Disposition and Care Coordination:    Admit:   Through independent evaluation of the patient's history, physical, and imperical data, the patient meets criteria for inpatient admission to the hospital.        Final diagnoses:   Acute hypoxemic respiratory failure   Pneumonia due to infectious organism, unspecified laterality, unspecified part of lung   Sepsis, due to unspecified organism, unspecified whether acute organ dysfunction present   Metabolic  encephalopathy   JOEL (acute kidney injury)   Elevated troponin        ED Disposition       ED Disposition   Decision to Admit    Condition   --    Comment   Level of Care: Critical Care [6]   Diagnosis: Acute hypoxic respiratory failure [3405193]   Admitting Physician: JUAN FALCON [[id number]]   Attending Physician: JUAN FALCON [[id number]]   Certification: I Certify That Inpatient Hospital Services Are Medically Necessary For Greater Than 2 Midnights                 This medical record created using voice recognition software.             Doretha Mann MD  05/21/24 4527

## 2024-05-22 NOTE — PROCEDURES
Procedure Name: Intubation     Indication: Respiratory failure, cardiac arrest    Consent obtained: emergent    Timeout: Performed with nursing staff prior to beginning procedure.     Medications used:  Pre-medications: Etomidate 0 mg IV  Paralytics: Rocuronium 0 mg IV    Procedure: Patient was placed in the supine position. Patient was preoxygenated with 100% O2 via BMV.   [place] scope was used to visualize the vocal cords as an 8.0 ET tube was placed in between the cords on the second attempt. Patient did have large amount of brown emesis and aspirated during procedure. Suction was used to visualized the vocal cords.  Position was verified by auscultation, condensate noted in ET tube and with Etco2 monitoring. Device was secured at 24 cm at lip. Patient was connected to the vent.     X-ray was ordered to correct tube placement.    Complications: None    Electronically signed by YASH King 05/22/24 11:51 EDT

## 2024-05-22 NOTE — PROGRESS NOTES
RT EQUIPMENT DEVICE RELATED - SKIN ASSESSMENT    RT Medical Equipment/Device:     NIV Mask:  Under-the-nose   size:  B    Skin Assessment:      Cheek:  Intact  Nose:  Intact    Device Skin Pressure Protection:  Absorbent pad utilzed/changed    Nurse Notification:  Yana Ferro, RRT

## 2024-05-22 NOTE — PLAN OF CARE
Goal Outcome Evaluation:   Patient was started on Airvo @ 60 liters and 88% early in shift but was unable to tolerate and switched back to Bipap @ 14/7 and 100% and has tolerated well rest of the night

## 2024-05-22 NOTE — PROGRESS NOTES
CARDIOLOGY  INPATIENT PROGRESS NOTE                [facility] INTENSIVE CARE UNIT    5/22/2024      PATIENT IDENTIFICATION:   Name:  Asaf Chilel      MRN:  [id number]     78 y.o.  male                 SUBJECTIVE:   Patient still very tachypneic and hypoxic this a.m. still requiring Levophed for pressure support.  Denies any chest discomfort  OBJECTIVE:  Vitals:    05/22/24 1100 05/22/24 1115 05/22/24 1120 05/22/24 1130   BP: 162/59 155/55 (!) 138/26    BP Location:       Patient Position:       Pulse: 68 (!) 40 (!) 43 (!) 32   Resp:       Temp:       TempSrc:       SpO2:   (!) 40%    Weight:       Height:               Body mass index is 28.17 kg/m².    Intake/Output Summary (Last 24 hours) at 5/22/2024 1328  Last data filed at 5/22/2024 0400  Gross per 24 hour   Intake 4380.94 ml   Output 950 ml   Net 3430.94 ml       Telemetry: Sinus tachycardia with intermittent bursts of brief SVT episodes    Physical Exam  General Appearance:   no acute distress  Alert and oriented x3  HENT:   lips not cyanotic  Atraumatic  Neck:  No jvd   supple  Respiratory:  no respiratory distress  normal breath sounds  no rales  Cardiovascular:    regular rhythm  no S3, no S4   no murmur  no rub  lower extremity edema: none    Skin:   warm, dry  No rashes      No Known Allergies  Scheduled meds:  aspirin, 81 mg, Oral, Daily  azithromycin, 500 mg, Intravenous, Q24H  cefTRIAXone, 2,000 mg, Intravenous, Q24H  famotidine, 20 mg, Intravenous, Q12H  heparin (porcine), 5,000 Units, Subcutaneous, Q8H  methylPREDNISolone sodium succinate, 40 mg, Intravenous, Q8H  metoprolol tartrate, 25 mg, Oral, Q12H  sodium chloride, 10 mL, Intravenous, Q12H      IV meds:                      EPINEPHrine, 1 mcg/kg/min, Last Rate: 1 mcg/kg/min (05/22/24 1112)  norepinephrine, 0.02-3 mcg/kg/min, Last Rate: 3 mcg/kg/min (05/22/24 1157)  sodium bicarbonate, 150 mEq, Last Rate: 150 mEq (05/22/24 0629)  vasopressin, 0.04 Units/min, Last Rate: 0.04  "Units/min (05/22/24 0801)      Data Review:  CBC          11/20/2023    12:06 5/21/2024    05:48 5/22/2024    04:32   CBC   WBC 9.21  8.20  29.61    RBC 4.45  5.70  4.74    Hemoglobin 13.6  16.9  14.0    Hematocrit 39.2  52.1  44.5    MCV 88.1  91.4  93.9    MCH 30.6  29.6  29.5    MCHC 34.7  32.4  31.5    RDW 13.2  15.7  15.6    Platelets 239  228  173      CMP          11/20/2023    12:06 5/21/2024    05:32 5/21/2024    05:48 5/22/2024    04:32 5/22/2024    11:38   CMP   Glucose 101  130  116  102  158    BUN 17   27  34     Creatinine 1.19   1.74  1.60     EGFR 62.9   39.9  44.1     Sodium 138  142.1  141  142  148.2    Potassium 4.3   4.0  4.7     Chloride 102   104  108     Calcium 9.6   9.8  9.0     Total Protein 7.0   6.7  6.4     Albumin 4.3   4.1  3.4     Globulin 2.7   2.6  3.0     Total Bilirubin 0.6   1.0  0.6     Alkaline Phosphatase 75   71  49     AST (SGOT) 18   38  54     ALT (SGPT) 15   33  36     Albumin/Globulin Ratio 1.6   1.6  1.1     BUN/Creatinine Ratio 14.3   15.5  21.3     Anion Gap 12.5   14.2  18.9        CARDIAC LABS:      Lab 05/21/24  0548   PROBNP 428.2   HSTROP T 47*        No results found for: \"DIGOXIN\"   Lab Results   Component Value Date    TSH 1.420 11/20/2023           Invalid input(s): \"LDLCALC\"  No results found for: \"POCTROP\"  Lab Results   Component Value Date    TROPONINT 47 (H) 05/21/2024   (  Lab Results   Component Value Date    MG 2.6 (H) 05/22/2024     Results for orders placed during the hospital encounter of 05/21/24    Adult Transthoracic Echo Complete W/ Cont if Necessary Per Protocol    Interpretation Summary    Technically difficult study due to limited windows.  IV Lumason contrast was used to enhance endocardial borders.    Left ventricular ejection fraction appears to be 61 - 65%.    Left ventricular wall thickness is consistent with moderate septal asymmetric hypertrophy. Sigmoid-shaped ventricular septum is present.    Left ventricular diastolic function " was indeterminate.    Cardiac valves morphology was not well-visualized on this study.  No hemodynamically significant valvular pathology was noted by color flow/velocity Doppler.           ASSESSMENT:    Acute hypoxic respiratory failure    Elevated troponin level not due myocardial infarction        PLAN:  1, ASA 81 qd  2, metoprolol as tolerated with some intermittent svt episodes fairly brief in nature  3. Cont with with iv abx and supportive care per pulmonary critical care          Eduardo Persaud MD  5/22/2024    13:28 EDT

## 2024-05-22 NOTE — PROCEDURES
Arterial Line Procedure Note     Indication: Cardiac arrest, right femoral arterial line explanted     Consent: None, emergently performed     A time-out was completed verifying correct patient, procedure, site, positioning, and special equipment if applicable.      Anuj´s test was not needed. The patient´s left groin was prepped and draped in sterile fashion. 1% Lidocaine was used to anesthetize the area. A long arterial line was introduced into the left femoral artery under ultrasound guidance. The catheter was threaded over the guide wire and the needle was removed with appropriate pulsatile blood return. The catheter was then sutured in place to the skin and a sterile dressing applied. Perfusion to the extremity distal to the point of catheter insertion was checked and found to be adequate.      Estimated Blood Loss: None     The patient tolerated the procedure well and there were no complications.     Electronically signed by Zac Keith MD, 05/22/24, 3:27 PM EDT.

## 2024-05-22 NOTE — PROGRESS NOTES
RT EQUIPMENT DEVICE RELATED - SKIN ASSESSMENT    RT Medical Equipment/Device:     NIV Mask:  Under-the-nose   size:  b    Skin Assessment:      Cheek:  Intact  Chin:  Intact  Nares:  Intact  Nose:  Intact  Mouth:  Intact    Device Skin Pressure Protection:  Positioning supports utilized    Nurse Notification:  Yana Remy, RRT

## 2024-05-22 NOTE — PROCEDURES
Arterial Line Procedure Note     Indication: Cardiac arrest     Consent: None, emergently performed     A time-out was completed verifying correct patient, procedure, site, positioning, and special equipment if applicable.      Anuj´s test was not needed. The patient´s right groin was prepped and draped in sterile fashion. 1% Lidocaine was used to anesthetize the area. A long arterial line was introduced into the right femoral artery under ultrasound guidance. The catheter was threaded over the guide wire and the needle was removed with appropriate pulsatile blood return. The catheter was then sutured in place to the skin and a sterile dressing applied. Perfusion to the extremity distal to the point of catheter insertion was checked and found to be adequate.      Estimated Blood Loss: None     The patient tolerated the procedure well and there were no complications.     Electronically signed by Zac Keith MD, 05/22/24, 3:26 PM EDT.

## 2024-05-22 NOTE — PROCEDURES
CODE BLUE procedure note    Responded to CODE BLUE.  Patient in asystole.  ACLS and CPR ongoing.  I ran the code.  Patient was converted to PEA arrest.  Received multiple pushes of calcium, sodium bicarb and epinephrine.  X-ray was normal.  Patient was very difficult to oxygenate.  Patient did have brief return of spontaneous circulation.  At that point patient was paralyzed and on a PEEP of 20 and on max dose bicarb drip, Levophed, vasopressin and epinephrine drips.  Despite this patient had worsening hypotension and bradycardia went to PEA arrest again.  Ultimately we did CPR and ACLS for over 30 minutes on this patient without return of spontaneous circulation.  Family did come to the bedside.  Decided on withdrawal of care at that point.  Patient .  Mechanism of death is severe refractory hypoxia from ARDS and refractory acidosis.    Electronically signed by Zac Keith MD, 24, 3:29 PM EDT.

## 2024-05-22 NOTE — PLAN OF CARE
Problem: Heart Failure Comorbidity  Goal: Maintenance of Heart Failure Symptom Control  5/22/2024 0428 by Nita Nice RN  Outcome: Ongoing, Not Progressing  5/22/2024 0427 by Nita Ncie RN  Reactivated     Problem: Noninvasive Ventilation Acute  Goal: Effective Unassisted Ventilation and Oxygenation  5/22/2024 0428 by Nita Nice RN  Outcome: Ongoing, Not Progressing  5/22/2024 0428 by Nita Nice RN  Reactivated     Problem: Adult Inpatient Plan of Care  Goal: Plan of Care Review  5/22/2024 0428 by Nita Nice RN  Outcome: Ongoing, Progressing  5/22/2024 0427 by Nita Nice RN  Reactivated  Goal: Patient-Specific Goal (Individualized)  5/22/2024 0428 by Nita Nice RN  Outcome: Ongoing, Progressing  5/22/2024 0427 by Nita Nice RN  Reactivated  Goal: Absence of Hospital-Acquired Illness or Injury  5/22/2024 0428 by Nita Nice RN  Outcome: Ongoing, Progressing  5/22/2024 0427 by Nita Nice RN  Reactivated  Intervention: Identify and Manage Fall Risk  Recent Flowsheet Documentation  Taken 5/22/2024 0400 by Nita Nice RN  Safety Promotion/Fall Prevention: safety round/check completed  Taken 5/22/2024 0300 by Nita Nice RN  Safety Promotion/Fall Prevention: safety round/check completed  Taken 5/22/2024 0200 by Nita Nice RN  Safety Promotion/Fall Prevention: safety round/check completed  Taken 5/22/2024 0100 by Nita Nice RN  Safety Promotion/Fall Prevention: safety round/check completed  Taken 5/22/2024 0000 by Nita Nice RN  Safety Promotion/Fall Prevention: safety round/check completed  Taken 5/21/2024 2300 by Nita Nice RN  Safety Promotion/Fall Prevention: safety round/check completed  Taken 5/21/2024 2200 by Nita Nice RN  Safety Promotion/Fall Prevention: safety round/check completed  Taken 5/21/2024 2100 by Nita Nice RN  Safety Promotion/Fall Prevention: safety round/check completed  Taken  5/21/2024 2000 by Nita Nice RN  Safety Promotion/Fall Prevention: safety round/check completed  Intervention: Prevent Skin Injury  Recent Flowsheet Documentation  Taken 5/22/2024 0400 by Nita Nice RN  Body Position: position changed independently  Taken 5/22/2024 0200 by Nita Nice RN  Body Position: position changed independently  Taken 5/22/2024 0000 by Nita Nice RN  Body Position: position changed independently  Taken 5/21/2024 2200 by Nita Nice RN  Body Position: position changed independently  Taken 5/21/2024 2000 by Nita Nice RN  Body Position: position changed independently  Intervention: Prevent and Manage VTE (Venous Thromboembolism) Risk  Recent Flowsheet Documentation  Taken 5/21/2024 2000 by Nita Nice RN  Activity Management: bedrest  Goal: Optimal Comfort and Wellbeing  5/22/2024 0428 by Nita Nice RN  Outcome: Ongoing, Progressing  5/22/2024 0427 by Nita Nice RN  Reactivated  Goal: Readiness for Transition of Care  5/22/2024 0428 by Nita Nice RN  Outcome: Ongoing, Progressing  5/22/2024 0427 by Nita Nice RN  Reactivated     Problem: Hypertension Comorbidity  Goal: Blood Pressure in Desired Range  5/22/2024 0428 by Nita Nice RN  Outcome: Ongoing, Progressing  5/22/2024 0427 by Nita Nice RN  Reactivated     Problem: Skin Injury Risk Increased  Goal: Skin Health and Integrity  5/22/2024 0428 by Nita Nice RN  Outcome: Ongoing, Progressing  5/22/2024 0427 by Nita Nice RN  Reactivated  Intervention: Optimize Skin Protection  Recent Flowsheet Documentation  Taken 5/22/2024 0400 by Nita Nice RN  Head of Bed (HOB) Positioning: HOB at 30-45 degrees  Taken 5/22/2024 0200 by Nita Nice RN  Head of Bed (HOB) Positioning: HOB at 30-45 degrees  Taken 5/22/2024 0000 by Nita Nice RN  Head of Bed (HOB) Positioning: HOB at 30-45 degrees  Taken 5/21/2024 2200 by Nita Nice,  RN  Head of Bed (HOB) Positioning: HOB at 30-45 degrees  Taken 5/21/2024 2000 by Nita Nice RN  Head of Bed (HOB) Positioning: HOB at 30-45 degrees     Problem: Fall Injury Risk  Goal: Absence of Fall and Fall-Related Injury  5/22/2024 0428 by Nita Nice RN  Outcome: Ongoing, Progressing  5/22/2024 0427 by Nita Nice RN  Reactivated  Intervention: Promote Injury-Free Environment  Recent Flowsheet Documentation  Taken 5/22/2024 0400 by Nita Nice RN  Safety Promotion/Fall Prevention: safety round/check completed  Taken 5/22/2024 0300 by Nita Nice RN  Safety Promotion/Fall Prevention: safety round/check completed  Taken 5/22/2024 0200 by Nita Nice RN  Safety Promotion/Fall Prevention: safety round/check completed  Taken 5/22/2024 0100 by Nita Nice RN  Safety Promotion/Fall Prevention: safety round/check completed  Taken 5/22/2024 0000 by Nita Nice RN  Safety Promotion/Fall Prevention: safety round/check completed  Taken 5/21/2024 2300 by Nita Nice RN  Safety Promotion/Fall Prevention: safety round/check completed  Taken 5/21/2024 2200 by Nita Nice RN  Safety Promotion/Fall Prevention: safety round/check completed  Taken 5/21/2024 2100 by Nita Nice RN  Safety Promotion/Fall Prevention: safety round/check completed  Taken 5/21/2024 2000 by Nita Nice RN  Safety Promotion/Fall Prevention: safety round/check completed   Goal Outcome Evaluation:         Pt a&ox4 but extremely anxious, afib with rates from 60-110s, bp still requiring pressor support, nassar, bedpan for bm. Pt work of breathing and respiratory rate drastically increase when pt off bipap

## 2024-05-22 NOTE — PROGRESS NOTES
Pulmonary / Critical Care Progress Note      Patient Name: Asaf Chilel  : 1945  MRN: [id number]  Attending:  Raudel John MD   Date of admission: 2024    Subjective   Subjective   Patient critically ill with septic shock, pneumococcal pneumonia, ARDS    Over past 24 hours:  Shock status little bit better this morning however requiring BiPAP and Airvo 100%  Does have worsening metabolic acidosis  Urine output poor but creatinine improving  Lactic acid increasing  Switch patient over to bicarb drip and discussed with patient that we may need to consider intubating now.  Patient was hesitant about this and wanted us to try to continue aggressive measures and see how he does on reassessment  I gave him a large dose of IV Lasix this morning and monitored him.  Unfortunately, he had asystole arrest.  Please see my code note for details  Ultimately he ended up on multiple pressors and drips, severe ARDS on PEEP of 20 and could not oxygenate him.  Family was at the bedside and made him comfort care.      Objective   Objective     Vitals:   Vital signs for last 24 hours:  Temp:  [99.2 °F (37.3 °C)-99.6 °F (37.6 °C)] 99.2 °F (37.3 °C)  Heart Rate:  [] 66  Resp:  [30-41] 35  BP: ()/() 130/56    Intake/Output last 3 shifts:  I/O last 3 completed shifts:  In: 845.4 [I.V.:845.4]  Out: 400 [Urine:400]  Intake/Output this shift:  I/O this shift:  In: 3535.5 [I.V.:3185.5; IV Piggyback:350]  Out: 550 [Urine:550]    Vent settings for last 24 hours:       Hemodynamic parameters for last 24 hours:       Physical Exam   Vital Signs Reviewed   General:  WDWN, Alert, NAD.    HEENT:  PERRL, EOMI.  OP, nares clear  Chest:  good aeration, clear to auscultation bilaterally, tympanic to percussion bilaterally, no work of breathing noted  CV: RRR, no MGR, pulses 2+, equal.  Abd:  Soft, NT, ND, + BS, no HSM  EXT:  no clubbing, no cyanosis, no edema  Neuro:  A&Ox3, CN grossly intact, no focal  deficits.  Skin: No rashes or lesions noted        Result Review    Result Review:  I have personally reviewed the results from the time of this admission to 5/22/2024 06:02 EDT and agree with these findings:  [x]  Laboratory  [x]  Microbiology  [x]  Radiology  [x]  EKG/Telemetry   [x]  Cardiology/Vascular   []  Pathology  []  Old records  []  Other:  Most notable findings include:       Lab 05/22/24  1138 05/22/24  0432 05/21/24  0548 05/21/24  0532   WBC  --  29.61* 8.20  --    HEMOGLOBIN  --  14.0 16.9  --    HEMATOCRIT  --  44.5 52.1*  --    PLATELETS  --  173 228  --    SODIUM  --  142 141  --    SODIUM, ARTERIAL 148.2*  --   --  142.1   POTASSIUM  --  4.7 4.0  --    CHLORIDE  --  108* 104  --    CO2  --  15.1* 22.8  --    BUN  --  34* 27*  --    CREATININE  --  1.60* 1.74*  --    GLUCOSE  --  102* 116*  --    GLUCOSE, ARTERIAL 158*  --   --  130*   CALCIUM  --  9.0 9.8  --    PHOSPHORUS  --  5.8*  --   --    TOTAL PROTEIN  --  6.4 6.7  --    ALBUMIN  --  3.4* 4.1  --    GLOBULIN  --  3.0 2.6  --      Lactic acid elevated   strep pneumo urine antigen positive      Assessment & Plan   Assessment / Plan     Active Hospital Problems:  Active Hospital Problems    Diagnosis     **Acute hypoxic respiratory failure     Elevated troponin level not due myocardial infarction        Impression:  Acute hypoxemic and hypercapnic respiratory failure require mechanical ventilation  Septic shock, present on admission  Cardiogenic shock  Lactic acidosis, clinically significant  Pneumococcal pneumonia  Severe ARDS  In-hospital cardiac arrest  Left ventricular septal hypertrophy  Aspiration pneumonia from strep pneumo  Aspiration of vomitus and airways  Altered mental status  Toxic/metabolic encephalopathy  Acute kidney injury secondary to prerenal etiology  Hypomagnesemia  Atrial fibrillation with RVR  NSTEMI, type II from demand ischemia  Chronic compensated systolic congestive heart failure  Metabolic acidosis  Anoxic  encephalopathy     Plan:  Started bicarb drip this morning and gave patient large dose of IV Lasix to see if we could decrease extravascular lung water and support his respiratory status   Was on BiPAP and Airvo with 100% FiO2.  Respiratory status is very tenuous  Escalate antibiotics to ceftriaxone plus azithromycin  Continue norepinephrine and vasopressin  Start stress dose steroids  Trend lactic acid  N.p.o. for now  Appreciate cardiology input  Can DC amiodarone drip and start low-dose beta-blocker  Patient had an asystolic arrest.  During this code the patient vomited and aspirated again leading to worsening oxygenation.  Please see my code note for details  Was able to get circulation back and was maxed on multiple pressors, paralyzed and on high PEEP of 20.  Patient had another arrest.  Family ultimately decided to withdraw care at that point.  Patient  rapidly    DVT prophylaxis:  Medical DVT prophylaxis orders are present.    CODE STATUS:   Level Of Support Discussed With: Next of Kin (If No Surrogate)  Code Status (Patient has no pulse and is not breathing): CPR (Attempt to Resuscitate)  Medical Interventions (Patient has pulse or is breathing): Full Support      The patient is critically ill in the ICU with septic shock, pneumococcal pneumonia, acute kidney injury, in-hospital cardiac arrest, cardiogenic shock, respiratory failure requiring mechanical ventilation, severe ARDS. Multidisciplinary bedside critical care rounds were performed with nursing staff, respiratory therapy, pharmacy, nutritional services, social work. I have personally reviewed the chart, labs and any pertinent imaging available.  I have spent 108 minutes of critical care time, excluding procedures, in the care of this patient.    Electronically signed by Zac Keith MD, 24, 3:26 PM EDT.

## 2024-05-23 LAB
BACTERIA SPEC AEROBE CULT: ABNORMAL
GRAM STN SPEC: ABNORMAL
ISOLATED FROM: ABNORMAL
QT INTERVAL: 358 MS
QTC INTERVAL: 515 MS

## 2024-05-24 NOTE — DISCHARGE SUMMARY
[facility]         HOSPITALIST  DISCHARGE SUMMARY    Patient Name: Asaf Chilel  : 1945  MRN: [id number]    Date of Admission: 2024  Date of Discharge:  2024  Primary Care Physician: Naheed Mane APRN    Consults       Date and Time Order Name Status Description    2024 12:15 PM Inpatient Pulmonology Consult Completed             Active and Resolved Hospital Problems:  Active Hospital Problems    Diagnosis POA    **Acute hypoxic respiratory failure [J96.01] Yes    Elevated troponin level not due myocardial infarction [R79.89] Unknown      Resolved Hospital Problems   No resolved problems to display.       Hospital Course     Hospital Course:  Asaf Chilel is a 78 y.o. male with past medical history of systolic and diastolic heart failure, hypertension, hyperlipidemia, hyperlipidemia, hypothyroidism, GERD and anxiety who presented with complaints of shortness of breath and altered mentation.  Patient was in his usual state of health until earlier this morning around 1 AM when he suddenly woke up with shortness of breath.  He also vomited as per the wife.  Patient is altered and history was obtained from his wife at bedside.  He had no symptoms prior to this morning and suddenly woke up gasping for air as per the wife.  He was confused and not his usual state of his mentation.  He was normal on 12 last night when he went to bed.  Wife denied any fever, cough, recent exposure to sick people or any other symptoms prior to this morning.  Compliant with his medications.     On presentation, patient was hypoxic to 84% in room air and was placed on nonrebreather mask.  Later was switched to BiPAP due to increased work of breathing.  Patient was tachycardic for which she received 1 dose of Cardizem.  Persistently hypotensive for which, amiodarone 150 mg IV once was given.  Patient was also started on Levophed for persistent hypotension.  Patient is able to tell his  name but not oriented and confused during my evaluation.  Follows commands.  BiPAP was removed for some time but patient desaturated to 83% with increased work of breathing for which he was placed back on BiPAP.    Lab work showed CBC with WBC 8.2, hemoglobin 16.9 and platelets 228.  Chemistry showed sodium 141, potassium 4, bicarb 22.8, BUN 27 and JOEL with creatinine 1.74.  Glucose 120 with repeat glucose under 60.  LFT was nonsignificant.  ABG was done which showed pH of 7.4, pCO2 28, pO2 55.59 with SpO2 arterial 89%.  Lactic acid 4.6 on presentation.  EKG showed possible sinus tachycardia with right bundle branch block.  Troponin 47 with proBNP 428.2.  CT head showed no acute brain abnormality but did show age-indeterminate possible severe acute, superimposed upon chronic, sinus disease.  CT chest with contrast showed no PE but did show bilateral pulmonary consolidation predominantly in the lower lobes, likely infectious multifocal pneumonia; filling defect within the trachea in the right mainstem bronchus likely representing mucous.  Patient is being admitted to critical care unit with working diagnosis of acute hypoxic respiratory failure and septic shock likely in the setting of multifocal pneumonia.  Intensivist notified.    Patient was being managed in critical care unit.  Patient had Russell placed for urinary retention.  BiPAP was continued with FiO2 100%.  Discussion was ongoing to consider intubation if respiratory status does not improve.  He did receive IV Lasix.  Later on 5/22, patient had asystole arrest; ACLS and CPR were done.  He received multiple rounds of ACLS and CPR.  Later his asystolic arrest converted to PEA arrest.  Received multiple pushes of calcium, sodium bicarb and epinephrine.  He did have brief return of spontaneous circulation.  He was on max dose of bicarb drip, Levophed, vasopressin and epinephrine drips.  Family were present at bedside.  Decided on withdrawal of care and patient   on 2023 at 1201.      Disposition:        Cause of death: Cardiorespiratory arrest likely in the setting of acute hypoxic and hypercapnic respiratory failure and septic/cardiogenic shock in the setting of pneumonia and ARDS.                  Pertinent  and/or Most Recent Results     PROCEDURES:       LAB RESULTS:      Lab 24  1138 24  0432 24  2225 24  1618 24  1341 24  1009 24  0548   WBC  --  29.61*  --   --   --   --  8.20   HEMOGLOBIN  --  14.0  --   --   --   --  16.9   HEMATOCRIT  --  44.5  --   --   --   --  52.1*   PLATELETS  --  173  --   --   --   --  228   NEUTROS ABS  --  25.56*  --   --   --   --  6.03   IMMATURE GRANS (ABS)  --  1.94*  --   --   --   --  0.04   LYMPHS ABS  --  1.34  --   --   --   --  1.75   MONOS ABS  --  0.76  --   --   --   --  0.29   EOS ABS  --  0.00  --   --   --   --  0.06   MCV  --  93.9  --   --   --   --  91.4   PROCALCITONIN  --   --   --   --   --   --  17.96*   LACTATE  --  5.1* 4.3* 4.9* 4.8*   < > 4.6*   LACTATE, ARTERIAL 11.39*  --   --   --   --   --   --     < > = values in this interval not displayed.         Lab 24  1138 24  0432 24  0548 24  0532   SODIUM  --  142 141  --    SODIUM, ARTERIAL 148.2*  --   --  142.1   POTASSIUM  --  4.7 4.0  --    CHLORIDE  --  108* 104  --    CO2  --  15.1* 22.8  --    ANION GAP  --  18.9* 14.2  --    BUN  --  34* 27*  --    CREATININE  --  1.60* 1.74*  --    EGFR  --  44.1* 39.9*  --    GLUCOSE  --  102* 116*  --    GLUCOSE, ARTERIAL 158*  --   --  130*   CALCIUM  --  9.0 9.8  --    IONIZED CALCIUM 1.71*  --   --  1.21   MAGNESIUM  --  2.6* 1.2*  --    PHOSPHORUS  --  5.8*  --   --          Lab 24  0432 24  0548   TOTAL PROTEIN 6.4 6.7   ALBUMIN 3.4* 4.1   GLOBULIN 3.0 2.6   ALT (SGPT) 36 33   AST (SGOT) 54* 38   BILIRUBIN 0.6 1.0   ALK PHOS 49 71         Lab 24  0548   PROBNP 428.2   HSTROP T 47*                 Lab  05/22/24  1138 05/22/24  0324 05/21/24  0532   PH, ARTERIAL 7.025* 7.309* 7.428   PCO2, ARTERIAL 69.3* 27.1* 28.2*   PO2 ART 95.8 115.5* 55.9*   O2 SATURATION ART 91.7* 97.4 89.0*   FIO2 100 100 100   HCO3 ART 17.7* 13.3* 18.2*   BASE EXCESS ART -13.6* -11.2* -4.3*   CARBOXYHEMOGLOBIN 0.2 0.3 0.4     Brief Urine Lab Results  (Last result in the past 365 days)        Color   Clarity   Blood   Leuk Est   Nitrite   Protein   CREAT   Urine HCG        05/21/24 1803 Yellow   Clear   Small (1+)   Negative   Negative   30 mg/dL (1+)                 Microbiology Results (last 10 days)       Procedure Component Value - Date/Time    Legionella Antigen, Urine - Urine, Urine, Clean Catch [[id number]]  (Normal) Collected: 05/21/24 1803    Lab Status: Final result Specimen: Urine, Clean Catch Updated: 05/21/24 1827     LEGIONELLA ANTIGEN, URINE Negative    S. Pneumo Ag Urine or CSF - Urine, Urine, Clean Catch [[id number]]  (Abnormal) Collected: 05/21/24 1803    Lab Status: Final result Specimen: Urine, Clean Catch Updated: 05/21/24 1827     Strep Pneumo Ag Positive    Respiratory Panel PCR w/COVID-19(SARS-CoV-2) GILDA/LUZ/NICK/PAD/COR/RADHA In-House, NP Swab in UTM/VTM, 2 HR TAT - Swab, Nasopharynx [[id number]]  (Abnormal) Collected: 05/21/24 1628    Lab Status: Final result Specimen: Swab from Nasopharynx Updated: 05/21/24 1735     ADENOVIRUS, PCR Not Detected     Coronavirus 229E Not Detected     Coronavirus HKU1 Not Detected     Coronavirus NL63 Not Detected     Coronavirus OC43 Not Detected     COVID19 Not Detected     Human Metapneumovirus Not Detected     Human Rhinovirus/Enterovirus Not Detected     Influenza A PCR Not Detected     Influenza B PCR Not Detected     Parainfluenza Virus 1 Not Detected     Parainfluenza Virus 2 Not Detected     Parainfluenza Virus 3 Detected     Parainfluenza Virus 4 Not Detected     RSV, PCR Not Detected     Bordetella pertussis pcr Not Detected     Bordetella parapertussis PCR Not Detected      Chlamydophila pneumoniae PCR Not Detected     Mycoplasma pneumo by PCR Not Detected    Narrative:      In the setting of a positive respiratory panel with a viral infection PLUS a negative procalcitonin without other underlying concern for bacterial infection, consider observing off antibiotics or discontinuation of antibiotics and continue supportive care. If the respiratory panel is positive for atypical bacterial infection (Bordetella pertussis, Chlamydophila pneumoniae, or Mycoplasma pneumoniae), consider antibiotic de-escalation to target atypical bacterial infection.    MRSA Screen, PCR (Inpatient) - Swab, Nares [[id number]]  (Normal) Collected: 05/21/24 1628    Lab Status: Final result Specimen: Swab from Nares Updated: 05/21/24 1818     MRSA PCR No MRSA Detected    Narrative:      The negative predictive value of this diagnostic test is high and should only be used to consider de-escalating anti-MRSA therapy. A positive result may indicate colonization with MRSA and must be correlated clinically.    Blood Culture - Blood, Hand, Right [[id number]]  (Abnormal) Collected: 05/21/24 0548    Lab Status: Final result Specimen: Blood from Hand, Right Updated: 05/23/24 0632     Blood Culture Staphylococcus, coagulase negative     Isolated from Anaerobic Bottle     Gram Stain Anaerobic Bottle Gram positive cocci in clusters    Narrative:      Probable contaminant requires clinical correlation, susceptibility not performed unless requested by physician.      Blood Culture - Blood, Hand, Right [[id number]]  (Normal) Collected: 05/21/24 0548    Lab Status: Preliminary result Specimen: Blood from Hand, Right Updated: 05/23/24 0601     Blood Culture No growth at 2 days    Blood Culture ID, PCR - Blood, Hand, Right [[id number]]  (Abnormal) Collected: 05/21/24 0548    Lab Status: Final result Specimen: Blood from Hand, Right Updated: 05/22/24 0907     BCID, PCR Staph spp, not aureus or lugdunensis. Identification by BCID2 PCR.      BOTTLE TYPE Anaerobic Bottle    Narrative:      mecA/C            XR Chest 1 View    Result Date: 5/21/2024  Impression: 1.  Right subclavian line tip is in the distal superior vena cava. 2.  Prominent densities in the basilar areas noted on the earlier chest x-ray less defined on the current study.   Electronically Signed By-Gerald Nunez MD On:5/21/2024 5:00 PM      CT Chest With Contrast Diagnostic    Result Date: 5/21/2024  The study is limited. No pulmonary embolism is seen. Bilateral pulmonary consolidation is present, predominantly in the lower lobes. The findings may represent infectious multifocal pneumonia. Aspiration pneumonia is possible. There are filling defects within the trachea and the right mainstem bronchus. They may represent mucus. Aspirated content cannot be excluded. Please see above comments for further detail.    Please note that portions of this note were completed with a voice recognition program.     Electronically Signed By-Pillo Grant MD On:5/21/2024 6:35 AM      CT Head Without Contrast    Result Date: 5/21/2024  The study is very limited due to patient motion artifact. Grossly, no acute brain abnormality is seen. Age-indeterminate but possibly severe acute, superimposed upon chronic, sinus disease is suggested.    Please note that portions of this note were completed with a voice recognition program.     Electronically Signed By-Pillo Grant MD On:5/21/2024 6:22 AM      XR Chest 1 View    Result Date: 5/21/2024  Pulmonary hypoinflation is seen with new bilateral airspace opacities, which may represent atelectasis alone (such as related to the low lung volumes). Pulmonary edema or pneumonia cannot be excluded. Probably no cardiac enlargement. Consider close interval clinical and imaging follow-up to ensure a benign progression.   Please note that portions of this note were completed with a voice recognition program.     Electronically Signed ByMarie Grant MD On:5/21/2024  6:14 AM                Results for orders placed during the hospital encounter of 05/21/24    Adult Transthoracic Echo Complete W/ Cont if Necessary Per Protocol    Interpretation Summary    Technically difficult study due to limited windows.  IV Lumason contrast was used to enhance endocardial borders.    Left ventricular ejection fraction appears to be 61 - 65%.    Left ventricular wall thickness is consistent with moderate septal asymmetric hypertrophy. Sigmoid-shaped ventricular septum is present.    Left ventricular diastolic function was indeterminate.    Cardiac valves morphology was not well-visualized on this study.  No hemodynamically significant valvular pathology was noted by color flow/velocity Doppler.      Labs Pending at Discharge:  Pending Labs       Order Current Status    Blood Culture - Blood, Hand, Right Preliminary result              Time spent on Discharge including face to face service:  35 minutes    Electronically signed by Raudel John MD, 05/23/24, 8:07 PM EDT.

## 2024-05-26 LAB — BACTERIA SPEC AEROBE CULT: NORMAL

## 2024-06-11 DIAGNOSIS — F41.9 ANXIETY: ICD-10-CM

## 2024-06-11 RX ORDER — BUSPIRONE HYDROCHLORIDE 10 MG/1
10 TABLET ORAL 3 TIMES DAILY
Qty: 90 TABLET | Refills: 1 | OUTPATIENT
Start: 2024-06-11
